# Patient Record
Sex: MALE | Race: WHITE | NOT HISPANIC OR LATINO | Employment: FULL TIME | URBAN - METROPOLITAN AREA
[De-identification: names, ages, dates, MRNs, and addresses within clinical notes are randomized per-mention and may not be internally consistent; named-entity substitution may affect disease eponyms.]

---

## 2017-01-06 DIAGNOSIS — K21.9 GASTROESOPHAGEAL REFLUX DISEASE WITHOUT ESOPHAGITIS: ICD-10-CM

## 2017-01-06 RX ORDER — OMEPRAZOLE 40 MG/1
CAPSULE, DELAYED RELEASE ORAL
Qty: 90 CAPSULE | Refills: 0 | Status: SHIPPED | OUTPATIENT
Start: 2017-01-06 | End: 2017-04-12 | Stop reason: SDUPTHER

## 2017-03-15 DIAGNOSIS — I10 HYPERTENSION, BENIGN: ICD-10-CM

## 2017-03-16 RX ORDER — ALLOPURINOL 100 MG/1
TABLET ORAL
Qty: 90 TABLET | Refills: 0 | Status: SHIPPED | OUTPATIENT
Start: 2017-03-16 | End: 2017-06-07 | Stop reason: SDUPTHER

## 2017-03-28 ENCOUNTER — OFFICE VISIT (OUTPATIENT)
Dept: INTERNAL MEDICINE | Facility: CLINIC | Age: 47
End: 2017-03-28

## 2017-03-28 VITALS
WEIGHT: 231 LBS | DIASTOLIC BLOOD PRESSURE: 82 MMHG | BODY MASS INDEX: 36.26 KG/M2 | TEMPERATURE: 98.9 F | HEIGHT: 67 IN | OXYGEN SATURATION: 97 % | HEART RATE: 76 BPM | SYSTOLIC BLOOD PRESSURE: 130 MMHG

## 2017-03-28 DIAGNOSIS — M79.10 MUSCLE PAIN: ICD-10-CM

## 2017-03-28 DIAGNOSIS — R53.83 FATIGUE, UNSPECIFIED TYPE: ICD-10-CM

## 2017-03-28 DIAGNOSIS — Z11.59 SCREENING FOR VIRAL DISEASE: ICD-10-CM

## 2017-03-28 DIAGNOSIS — R73.09 ELEVATED GLUCOSE: ICD-10-CM

## 2017-03-28 DIAGNOSIS — I10 ESSENTIAL HYPERTENSION: Primary | ICD-10-CM

## 2017-03-28 DIAGNOSIS — K21.00 GASTROESOPHAGEAL REFLUX DISEASE WITH ESOPHAGITIS: ICD-10-CM

## 2017-03-28 DIAGNOSIS — E78.49 OTHER HYPERLIPIDEMIA: ICD-10-CM

## 2017-03-28 LAB
ALBUMIN SERPL-MCNC: 4.11 G/DL (ref 3.4–4.6)
ALBUMIN/GLOB SERPL: 1.4 G/DL
ALP SERPL-CCNC: 72 U/L (ref 46–116)
ALT SERPL W P-5'-P-CCNC: 36 U/L (ref 16–63)
ANION GAP SERPL CALCULATED.3IONS-SCNC: 10 MMOL/L
AST SERPL-CCNC: 35 U/L (ref 7–37)
BILIRUB SERPL-MCNC: 0.4 MG/DL (ref 0.2–1)
BUN BLD-MCNC: 15 MG/DL (ref 6–22)
BUN/CREAT SERPL: 9.4 (ref 7–25)
CALCIUM SPEC-SCNC: 9 MG/DL (ref 8.6–10.5)
CHLORIDE SERPL-SCNC: 103 MMOL/L (ref 95–107)
CHOLEST SERPL-MCNC: 203 MG/DL (ref 0–200)
CO2 SERPL-SCNC: 28 MMOL/L (ref 23–32)
CREAT BLD-MCNC: 1.6 MG/DL (ref 0.7–1.3)
EXPIRATION DATE: NORMAL
FLUAV AG NPH QL: NEGATIVE
FLUBV AG NPH QL: NEGATIVE
GFR SERPL CREATININE-BSD FRML MDRD: 47 ML/MIN/1.73
GLOBULIN UR ELPH-MCNC: 3 GM/DL
GLUCOSE BLD-MCNC: 104 MG/DL (ref 70–100)
HBA1C MFR BLD: 5.9 % (ref 4–6)
HDLC SERPL-MCNC: 45 MG/DL (ref 40–81)
INTERNAL CONTROL: NORMAL
LDLC SERPL CALC-MCNC: 108 MG/DL (ref 0–100)
LDLC/HDLC SERPL: 2.4 {RATIO}
Lab: NORMAL
POTASSIUM BLD-SCNC: 4.7 MMOL/L (ref 3.3–5.3)
PROT SERPL-MCNC: 7.1 G/DL (ref 6.3–8.4)
SODIUM BLD-SCNC: 141 MMOL/L (ref 136–145)
TRIGL SERPL-MCNC: 250 MG/DL (ref 0–150)
VLDLC SERPL-MCNC: 50 MG/DL

## 2017-03-28 PROCEDURE — 87804 INFLUENZA ASSAY W/OPTIC: CPT | Performed by: INTERNAL MEDICINE

## 2017-03-28 PROCEDURE — 80061 LIPID PANEL: CPT | Performed by: INTERNAL MEDICINE

## 2017-03-28 PROCEDURE — 99214 OFFICE O/P EST MOD 30 MIN: CPT | Performed by: INTERNAL MEDICINE

## 2017-03-28 PROCEDURE — 80053 COMPREHEN METABOLIC PANEL: CPT | Performed by: INTERNAL MEDICINE

## 2017-03-28 PROCEDURE — 83036 HEMOGLOBIN GLYCOSYLATED A1C: CPT | Performed by: INTERNAL MEDICINE

## 2017-03-28 RX ORDER — PROMETHAZINE HYDROCHLORIDE 12.5 MG/1
12.5 TABLET ORAL EVERY 6 HOURS PRN
COMMUNITY
End: 2017-08-07

## 2017-03-28 NOTE — PROGRESS NOTES
"Subjective   Isma De Luna is a 47 y.o. male here for   Chief Complaint   Patient presents with   • Hyperlipidemia   • Hypertension   • URI   .    Vitals:    03/28/17 0834   BP: 130/82   BP Location: Left arm   Patient Position: Sitting   Cuff Size: Adult   Pulse: 76   Temp: 98.9 °F (37.2 °C)   SpO2: 97%   Weight: 231 lb (105 kg)   Height: 66.75\" (169.5 cm)       Hyperlipidemia   This is a chronic problem. The current episode started more than 1 year ago. The problem is controlled. Recent lipid tests were reviewed and are normal. He has no history of diabetes or hypothyroidism. Pertinent negatives include no chest pain or shortness of breath.   Hypertension   This is a chronic problem. The current episode started more than 1 year ago. The problem is unchanged. The problem is controlled. Associated symptoms include malaise/fatigue. Pertinent negatives include no anxiety, chest pain, palpitations, peripheral edema or shortness of breath.   URI    This is a new problem. The current episode started today. The problem has been unchanged. There has been no fever. Associated symptoms include congestion, coughing and ear pain. Pertinent negatives include no chest pain, sneezing, sore throat or wheezing. He has tried nothing for the symptoms.        Encounter Diagnoses   Name Primary?   • Essential hypertension Yes   • Other hyperlipidemia    • Fatigue, unspecified type    • Elevated glucose    • Gastroesophageal reflux disease with esophagitis    • Screening for viral disease    • Muscle pain        The following portions of the patient's history were reviewed and updated as appropriate: allergies, current medications, past social history and problem list.    Review of Systems   Constitutional: Positive for fatigue and malaise/fatigue. Negative for activity change, appetite change, chills and fever.   HENT: Positive for congestion, ear pain and postnasal drip. Negative for sinus pressure, sneezing, sore throat, trouble " swallowing and voice change.    Respiratory: Positive for cough. Negative for chest tightness, shortness of breath and wheezing.    Cardiovascular: Negative for chest pain, palpitations and leg swelling.   Psychiatric/Behavioral: Negative for dysphoric mood and sleep disturbance. The patient is not nervous/anxious.        Objective   Physical Exam   Constitutional: He appears well-developed and well-nourished. No distress.   HENT:   Head: Normocephalic.   Right Ear: External ear normal. Tympanic membrane is bulging. Tympanic membrane is not erythematous.   Left Ear: External ear normal. Tympanic membrane is bulging. Tympanic membrane is not erythematous.   Nose: Right sinus exhibits no frontal sinus tenderness. Left sinus exhibits no frontal sinus tenderness.   Mouth/Throat: Oropharynx is clear and moist. No oropharyngeal exudate.   Neck: Normal range of motion. Neck supple.   Cardiovascular: Normal rate, regular rhythm and normal heart sounds.    Pulmonary/Chest: Effort normal and breath sounds normal. No respiratory distress. He has no wheezes. He has no rales. He exhibits no tenderness.   Musculoskeletal: He exhibits no edema.   Lymphadenopathy:     He has no cervical adenopathy.   Psychiatric: He has a normal mood and affect. His behavior is normal.   Nursing note and vitals reviewed.    Influenza A/B negative today.    Assessment/Plan   Problem List Items Addressed This Visit        Unprioritized    Hypertension - Primary    Relevant Orders    Comprehensive Metabolic Panel (Completed)    Lipid Panel (Completed)    Hyperlipidemia    Relevant Orders    Comprehensive Metabolic Panel (Completed)    Lipid Panel (Completed)    Gastroesophageal reflux disease with esophagitis    Elevated glucose    Relevant Orders    Hemoglobin A1c (Completed)      Other Visit Diagnoses     Fatigue, unspecified type        Screening for viral disease        Relevant Orders    HIV-1 / O / 2 Ag / Antibody 4th Generation    Muscle pain         Relevant Orders    POCT Influenza A/B (Completed)       HTN stable.   High chol & glc - need diet & exercise.   Fatigue - call if incr sx.   Muscle pain & head congestion - need fluids, rest, otc med for congestion.  Call if worsening.   No sign of infection so far.   He requests HIV test today.       Pneumovax 23 done 10/2012.  Need prevnar 13 in order to protect ill brother - to discuss with insurance.

## 2017-03-29 LAB — HIV 1+2 AB+HIV1 P24 AG SERPL QL IA: NON REACTIVE

## 2017-04-12 DIAGNOSIS — K21.9 GASTROESOPHAGEAL REFLUX DISEASE WITHOUT ESOPHAGITIS: ICD-10-CM

## 2017-04-13 RX ORDER — OMEPRAZOLE 40 MG/1
CAPSULE, DELAYED RELEASE ORAL
Qty: 90 CAPSULE | Refills: 1 | Status: SHIPPED | OUTPATIENT
Start: 2017-04-13 | End: 2017-10-04 | Stop reason: SDUPTHER

## 2017-06-07 DIAGNOSIS — I10 HYPERTENSION, BENIGN: ICD-10-CM

## 2017-06-08 RX ORDER — ESCITALOPRAM OXALATE 20 MG/1
TABLET ORAL
Qty: 90 TABLET | Refills: 1 | Status: SHIPPED | OUTPATIENT
Start: 2017-06-08 | End: 2017-11-06

## 2017-06-08 RX ORDER — LOSARTAN POTASSIUM 100 MG/1
TABLET ORAL
Qty: 90 TABLET | Refills: 1 | Status: SHIPPED | OUTPATIENT
Start: 2017-06-08 | End: 2017-12-06 | Stop reason: SDUPTHER

## 2017-06-08 RX ORDER — ALLOPURINOL 100 MG/1
TABLET ORAL
Qty: 90 TABLET | Refills: 1 | Status: SHIPPED | OUTPATIENT
Start: 2017-06-08 | End: 2017-12-06 | Stop reason: SDUPTHER

## 2017-08-07 ENCOUNTER — OFFICE VISIT (OUTPATIENT)
Dept: INTERNAL MEDICINE | Facility: CLINIC | Age: 47
End: 2017-08-07

## 2017-08-07 VITALS
BODY MASS INDEX: 38.3 KG/M2 | HEIGHT: 67 IN | TEMPERATURE: 98.2 F | DIASTOLIC BLOOD PRESSURE: 90 MMHG | WEIGHT: 244 LBS | SYSTOLIC BLOOD PRESSURE: 160 MMHG | OXYGEN SATURATION: 97 % | HEART RATE: 81 BPM

## 2017-08-07 DIAGNOSIS — G54.0 BRACHIAL PLEXUS NEUROPATHY: ICD-10-CM

## 2017-08-07 DIAGNOSIS — G54.0 BRACHIAL PLEXUS NEUROPATHY: Primary | ICD-10-CM

## 2017-08-07 DIAGNOSIS — M54.50 MIDLINE LOW BACK PAIN WITHOUT SCIATICA: ICD-10-CM

## 2017-08-07 DIAGNOSIS — M54.2 NECK PAIN: Primary | ICD-10-CM

## 2017-08-07 PROBLEM — Q85.03: Status: ACTIVE | Noted: 2017-02-08

## 2017-08-07 PROBLEM — Z98.890 HISTORY OF LUMBOSACRAL SPINE SURGERY: Status: ACTIVE | Noted: 2017-02-08

## 2017-08-07 PROCEDURE — 99214 OFFICE O/P EST MOD 30 MIN: CPT | Performed by: NURSE PRACTITIONER

## 2017-08-07 RX ORDER — METHYLPREDNISOLONE 4 MG/1
TABLET ORAL
Qty: 21 TABLET | Refills: 0 | Status: SHIPPED | OUTPATIENT
Start: 2017-08-07 | End: 2017-08-28

## 2017-08-07 RX ORDER — METHYLPREDNISOLONE 4 MG/1
TABLET ORAL
Qty: 21 TABLET | Refills: 0 | OUTPATIENT
Start: 2017-08-07

## 2017-08-07 RX ORDER — HYDROCODONE BITARTRATE AND ACETAMINOPHEN 10; 325 MG/1; MG/1
1 TABLET ORAL EVERY 6 HOURS PRN
Qty: 120 TABLET | Refills: 0 | Status: SHIPPED | OUTPATIENT
Start: 2017-08-07 | End: 2017-10-17 | Stop reason: SDUPTHER

## 2017-08-07 NOTE — TELEPHONE ENCOUNTER
This Rx was denied due to dx associated was for midline low back pain w/o sciatica and patient states it's for inflammation and swelling on the left side of his neck that is radiating up to his scalp.     This was last prescribed back on 7/516    Please advise.    Thank you,    Saad

## 2017-08-07 NOTE — TELEPHONE ENCOUNTER
----- Message from Alia Wills sent at 8/7/2017 12:34 PM EDT -----  Contact: Patient  Dr. Benoit patient.      Patient calling back about steroid Rx for his neurofibromatosis.  States he has painful area on left of neck.  Please advise.      MethylPREDNISolone 4 MG FOLLOW PACKAGE DIRECTIONS      Patient:  [phone]    Pharmacy:  [facility] [address] [place] [address] [place]  AT [facility] [address] - [phone] [facility] [phone] FX

## 2017-08-07 NOTE — TELEPHONE ENCOUNTER
----- Message from Ajnolan Brayan sent at 8/7/2017 10:51 AM EDT -----  Contact: pt  Pt calling, he requested a refill on Medrol Dose jeremiah. Patient called earlier and was told to call his pharmacy to request a refill. The refill request was denied.  Pt says that he has Neurofibromas Condition and occasionally he gets a flare up. Pt says that he has pain, inflammation and swelling on the left side of his neck going up into his scalp. He says usually he just get a prescription for medrol jeremiah to get it down. He says that he only gets is when needed. He is unable to take ibuprofen because he has a kidney disease. Pt is requesting a  Refill. Please advise.     MethylPREDNISolone 4 MG FOLLOW PACKAGE DIRECTIONS     [facility] [id number] - [address] AT [facility] [address] - [phone]  - [phone] FX    #[phone]

## 2017-08-08 NOTE — PROGRESS NOTES
Subjective   Isma De Luna is a 47 y.o. male who presents due to left-sided neck pain.    HPI Comments: He has a history of brachial plexus neuropathy which has been evaluated but MRI in the past. He has done relatively well until the past week when he c/o left-sided neck pain and decreased range of motion. Of course, he is not a candidate for NSAIDs due to hx of renal cancer, has taken Tylenol with mild pain relief.     Neck Pain    This is a recurrent problem. The current episode started more than 1 year ago. The problem occurs intermittently. The problem has been waxing and waning. The pain is present in the left side and midline. The quality of the pain is described as aching and shooting. The pain is at a severity of 9/10. The pain is severe. The symptoms are aggravated by twisting and position. Associated symptoms include headaches, numbness and tingling. Pertinent negatives include no chest pain, fever, trouble swallowing or weakness. He has tried ice and heat for the symptoms. The treatment provided mild relief.        The following portions of the patient's history were reviewed and updated as appropriate: allergies, current medications, past social history and problem list.    Past Medical History:   Diagnosis Date   • Backache    • Chronic pain    • Dyspepsia    • ED (erectile dysfunction)    • Gout    • Hyperlipidemia    • Hypertension    • Renal cancer    • Vitamin B12 deficiency          Current Outpatient Prescriptions:   •  allopurinol (ZYLOPRIM) 100 MG tablet, TAKE 1 TABLET BY MOUTH EVERY DAY, Disp: 90 tablet, Rfl: 1  •  B Complex-Biotin-FA (HM VITAMIN B100 COMPLEX PO), Take 1 tablet by mouth daily., Disp: , Rfl:   •  carbonyl iron (FEOSOL) 45 MG tablet tablet, Take  by mouth daily., Disp: , Rfl:   •  Cholecalciferol (VITAMIN D3) 600 UNITS capsule capsule, Take 600 Units by mouth daily., Disp: , Rfl:   •  Cyanocobalamin 1000 MCG sublingual tablet, Place 1 tablet under the tongue daily., Disp: , Rfl:    •  cyclobenzaprine (FLEXERIL) 10 MG tablet, TK 1 T PO TID PRF MUSCLE SPASMS, Disp: , Rfl: 2  •  escitalopram (LEXAPRO) 20 MG tablet, TAKE 1 TABLET BY MOUTH EVERY DAY, Disp: 90 tablet, Rfl: 1  •  fexofenadine (ALLEGRA) 180 MG tablet, Take 180 mg by mouth daily., Disp: , Rfl:   •  gabapentin (NEURONTIN) 300 MG capsule, TAKE 1 CAPSULE BY MOUTH 5 TIMES DAILY (Patient taking differently: Taking 600 mg TID), Disp: 450 capsule, Rfl: 0  •  losartan (COZAAR) 100 MG tablet, TAKE 1 TABLET BY MOUTH DAILY, Disp: 90 tablet, Rfl: 1  •  Multiple Vitamin (MULTI-VITAMIN DAILY PO), Take  by mouth., Disp: , Rfl:   •  omeprazole (priLOSEC) 40 MG capsule, TAKE 1 CAPSULE BY MOUTH DAILY, Disp: 90 capsule, Rfl: 1  •  sildenafil (REVATIO) 20 MG tablet, Take 1 tablet by mouth Daily., Disp: 90 tablet, Rfl: 3  •  HYDROcodone-acetaminophen (NORCO)  MG per tablet, Take 1 tablet by mouth Every 6 (Six) Hours As Needed for Moderate Pain (4-6)., Disp: 120 tablet, Rfl: 0  •  MethylPREDNISolone (MEDROL) 4 MG tablet, follow package directions, Disp: 21 tablet, Rfl: 0    Allergies   Allergen Reactions   • Pollen Extract      Other reaction(s): Other (See Comments)  Pollen, rag weed        Review of Systems   Constitutional: Negative for chills, fatigue, fever and unexpected weight change.   HENT: Negative for congestion, ear pain, postnasal drip, sinus pressure, sore throat and trouble swallowing.    Eyes: Negative for visual disturbance.   Respiratory: Negative for cough, chest tightness and wheezing.    Cardiovascular: Negative for chest pain, palpitations and leg swelling.   Gastrointestinal: Negative for abdominal pain, blood in stool, nausea and vomiting.   Endocrine: Negative for cold intolerance and heat intolerance.   Genitourinary: Negative for dysuria, frequency and urgency.   Musculoskeletal: Positive for neck pain and neck stiffness. Negative for arthralgias and joint swelling.   Skin: Negative for color change.  "  Allergic/Immunologic: Negative for immunocompromised state.   Neurological: Positive for tingling, numbness and headaches. Negative for syncope and weakness.   Hematological: Does not bruise/bleed easily.       Objective   Vitals:    08/07/17 1447   BP: 160/90   BP Location: Left arm   Patient Position: Sitting   Cuff Size: Adult   Pulse: 81   Temp: 98.2 °F (36.8 °C)   TempSrc: Oral   SpO2: 97%   Weight: 244 lb (111 kg)   Height: 66.75\" (169.5 cm)     Physical Exam   Constitutional: He is oriented to person, place, and time. He appears well-developed and well-nourished. No distress.   HENT:   Head: Normocephalic and atraumatic.   Right Ear: External ear normal.   Left Ear: External ear normal.   Eyes: Conjunctivae are normal.   Neck: Neck supple. Muscular tenderness present. No spinous process tenderness present. Decreased range of motion present.   Cardiovascular: Normal rate, regular rhythm, normal heart sounds and intact distal pulses.  Exam reveals no gallop and no friction rub.    No murmur heard.  Pulmonary/Chest: Effort normal and breath sounds normal. No respiratory distress.   Lymphadenopathy:     He has no cervical adenopathy.   Neurological: He is alert and oriented to person, place, and time.   Skin: Skin is warm and dry. No rash noted. No erythema.   Psychiatric: He has a normal mood and affect. His behavior is normal.   Nursing note and vitals reviewed.      Assessment/Plan   Isma was seen today for neck pain.    Diagnoses and all orders for this visit:    Neck pain  Comments:  Hydrocodone refilled by Dr. De Luna, he has f/u with Dr. Benoit next month  Orders:  -     MethylPREDNISolone (MEDROL) 4 MG tablet; follow package directions    Brachial plexus neuropathy  Comments:  with acute flare, will treat with Medrol & refill Hydrocodone, consider further evaluation if sx persist/worsen (discussed)    BIRGIT query complete. Treatment plan to include limited course of prescribed  controlled substance. " Risks including addiction, benefits, and alternatives presented to patient.

## 2017-08-28 ENCOUNTER — OFFICE VISIT (OUTPATIENT)
Dept: INTERNAL MEDICINE | Facility: CLINIC | Age: 47
End: 2017-08-28

## 2017-08-28 VITALS
WEIGHT: 243 LBS | BODY MASS INDEX: 38.14 KG/M2 | HEIGHT: 67 IN | SYSTOLIC BLOOD PRESSURE: 110 MMHG | DIASTOLIC BLOOD PRESSURE: 80 MMHG

## 2017-08-28 DIAGNOSIS — G54.0 BRACHIAL PLEXUS NEUROPATHY: ICD-10-CM

## 2017-08-28 DIAGNOSIS — M54.2 NECK PAIN: Primary | ICD-10-CM

## 2017-08-28 PROCEDURE — 99213 OFFICE O/P EST LOW 20 MIN: CPT | Performed by: NURSE PRACTITIONER

## 2017-08-28 RX ORDER — GABAPENTIN 600 MG/1
TABLET ORAL 3 TIMES DAILY
Refills: 3 | COMMUNITY
Start: 2017-08-10 | End: 2017-11-06

## 2017-08-28 RX ORDER — METHYLPREDNISOLONE 4 MG/1
TABLET ORAL
Qty: 21 TABLET | Refills: 0 | Status: SHIPPED | OUTPATIENT
Start: 2017-08-28 | End: 2017-09-26

## 2017-08-28 NOTE — PROGRESS NOTES
Subjective   Isma De Luna is a 47 y.o. male.     HPI Comments: Most recent imaging of brachial plexus 2/2017 at which time he saw neurologist for routine follow up. No acute changes noted. He is holding on any surgery due to possibility of loss of motor use of left upper extremity. He was treated for similar symptoms 8/7/2017 and treated with medrol dose jeremiah with relief.     Neck Pain    This is a chronic problem. The current episode started in the past 7 days. The problem occurs intermittently. The problem has been gradually worsening. The pain is associated with nothing. The pain is present in the left side. The quality of the pain is described as aching and stabbing (intermittent stabbing ). The pain is at a severity of 4/10 (worst 7/10). The pain is mild. The symptoms are aggravated by twisting and position. Pertinent negatives include no chest pain, fever, numbness or tingling. Treatments tried: hydrocodone and increased dosing of gabapentin  The treatment provided mild relief.        The following portions of the patient's history were reviewed and updated as appropriate: allergies, current medications, past family history, past medical history, past social history, past surgical history and problem list.    Review of Systems   Constitutional: Negative for activity change, appetite change, fatigue and fever.   Respiratory: Negative for cough, shortness of breath and wheezing.    Cardiovascular: Negative for chest pain, palpitations and leg swelling.   Musculoskeletal: Positive for neck pain and neck stiffness.   Skin: Negative for rash.   Neurological: Negative for tingling and numbness.       Objective   Physical Exam   Constitutional: He is oriented to person, place, and time. He appears well-developed and well-nourished.   HENT:   Head: Normocephalic.   Nose: Nose normal.   Neck: Muscular tenderness present. Decreased range of motion present.       Cardiovascular: Regular rhythm and normal heart sounds.   Exam reveals no S3 and no S4.    No murmur heard.  Pulmonary/Chest: Effort normal and breath sounds normal. He has no decreased breath sounds. He has no wheezes. He has no rhonchi. He has no rales.   Musculoskeletal: He exhibits no edema.   Neurological: He is alert and oriented to person, place, and time. Gait normal.   Skin: Skin is warm and dry.   Psychiatric: He has a normal mood and affect.       Assessment/Plan   Isma was seen today for neck pain.    Diagnoses and all orders for this visit:    Neck pain  Comments:  acute flare; will treat with medrol; may take small dose of muscle relaxer if needed   Orders:  -     MethylPREDNISolone (MEDROL) 4 MG tablet; follow package directions    Brachial plexus neuropathy  Comments:  most recent imaging 2/2017 (will obtain imaging report-pt report no changes)

## 2017-09-26 ENCOUNTER — OFFICE VISIT (OUTPATIENT)
Dept: INTERNAL MEDICINE | Facility: CLINIC | Age: 47
End: 2017-09-26

## 2017-09-26 VITALS
WEIGHT: 245.6 LBS | HEIGHT: 67 IN | DIASTOLIC BLOOD PRESSURE: 88 MMHG | SYSTOLIC BLOOD PRESSURE: 152 MMHG | BODY MASS INDEX: 38.55 KG/M2

## 2017-09-26 DIAGNOSIS — E78.49 OTHER HYPERLIPIDEMIA: ICD-10-CM

## 2017-09-26 DIAGNOSIS — G89.29 OTHER CHRONIC PAIN: ICD-10-CM

## 2017-09-26 DIAGNOSIS — R73.09 ELEVATED GLUCOSE: ICD-10-CM

## 2017-09-26 DIAGNOSIS — Q85.00 NEUROFIBROMATOSIS (HCC): ICD-10-CM

## 2017-09-26 DIAGNOSIS — E53.8 VITAMIN B12 DEFICIENCY: ICD-10-CM

## 2017-09-26 DIAGNOSIS — I10 ESSENTIAL HYPERTENSION: Primary | ICD-10-CM

## 2017-09-26 DIAGNOSIS — M79.2 NEUROPATHIC PAIN, ARM: ICD-10-CM

## 2017-09-26 DIAGNOSIS — Z11.59 SCREENING FOR VIRAL DISEASE: ICD-10-CM

## 2017-09-26 DIAGNOSIS — Z86.39 HISTORY OF VITAMIN D DEFICIENCY: ICD-10-CM

## 2017-09-26 DIAGNOSIS — F39 MOOD DISORDER (HCC): ICD-10-CM

## 2017-09-26 LAB
ALBUMIN SERPL-MCNC: 4.5 G/DL (ref 3.5–5.2)
ALBUMIN/GLOB SERPL: 1.7 G/DL
ALP SERPL-CCNC: 63 U/L (ref 39–117)
ALT SERPL W P-5'-P-CCNC: 20 U/L (ref 1–41)
ANION GAP SERPL CALCULATED.3IONS-SCNC: 11.2 MMOL/L
AST SERPL-CCNC: 23 U/L (ref 1–40)
BILIRUB SERPL-MCNC: 0.4 MG/DL (ref 0.1–1.2)
BUN BLD-MCNC: 14 MG/DL (ref 6–20)
BUN/CREAT SERPL: 8.9 (ref 7–25)
CALCIUM SPEC-SCNC: 10 MG/DL (ref 8.6–10.5)
CHLORIDE SERPL-SCNC: 103 MMOL/L (ref 98–107)
CHOLEST SERPL-MCNC: 186 MG/DL (ref 0–200)
CO2 SERPL-SCNC: 27.8 MMOL/L (ref 22–29)
CREAT BLD-MCNC: 1.58 MG/DL (ref 0.76–1.27)
FOLATE SERPL-MCNC: >20 NG/ML (ref 4.78–24.2)
GFR SERPL CREATININE-BSD FRML MDRD: 47 ML/MIN/1.73
GLOBULIN UR ELPH-MCNC: 2.6 GM/DL
GLUCOSE BLD-MCNC: 106 MG/DL (ref 65–99)
HBA1C MFR BLD: 5.5 % (ref 4.8–5.6)
HDLC SERPL-MCNC: 38 MG/DL (ref 40–60)
HIV1 P24 AG SER QL: NORMAL
HIV1+2 AB SER QL: NORMAL
LDLC SERPL CALC-MCNC: 92 MG/DL (ref 0–100)
LDLC/HDLC SERPL: 2.43 {RATIO}
POTASSIUM BLD-SCNC: 4.5 MMOL/L (ref 3.5–5.2)
PROT SERPL-MCNC: 7.1 G/DL (ref 6–8.5)
SODIUM BLD-SCNC: 142 MMOL/L (ref 136–145)
TRIGL SERPL-MCNC: 279 MG/DL (ref 0–150)
VIT B12 BLD-MCNC: 664 PG/ML (ref 211–946)
VLDLC SERPL-MCNC: 55.8 MG/DL (ref 5–40)

## 2017-09-26 PROCEDURE — G0432 EIA HIV-1/HIV-2 SCREEN: HCPCS | Performed by: INTERNAL MEDICINE

## 2017-09-26 PROCEDURE — 82746 ASSAY OF FOLIC ACID SERUM: CPT | Performed by: INTERNAL MEDICINE

## 2017-09-26 PROCEDURE — 80053 COMPREHEN METABOLIC PANEL: CPT | Performed by: INTERNAL MEDICINE

## 2017-09-26 PROCEDURE — 80061 LIPID PANEL: CPT | Performed by: INTERNAL MEDICINE

## 2017-09-26 PROCEDURE — 82607 VITAMIN B-12: CPT | Performed by: INTERNAL MEDICINE

## 2017-09-26 PROCEDURE — 99214 OFFICE O/P EST MOD 30 MIN: CPT | Performed by: INTERNAL MEDICINE

## 2017-09-26 PROCEDURE — 36415 COLL VENOUS BLD VENIPUNCTURE: CPT | Performed by: INTERNAL MEDICINE

## 2017-09-26 PROCEDURE — 87899 AGENT NOS ASSAY W/OPTIC: CPT | Performed by: INTERNAL MEDICINE

## 2017-09-26 PROCEDURE — 83036 HEMOGLOBIN GLYCOSYLATED A1C: CPT | Performed by: INTERNAL MEDICINE

## 2017-09-26 RX ORDER — CYCLOBENZAPRINE HCL 10 MG
10 TABLET ORAL 3 TIMES DAILY PRN
Qty: 90 TABLET | Refills: 4 | Status: SHIPPED | OUTPATIENT
Start: 2017-09-26 | End: 2019-11-20 | Stop reason: SDUPTHER

## 2017-09-26 RX ORDER — METHYLPREDNISOLONE 4 MG/1
TABLET ORAL
Qty: 21 TABLET | Refills: 0 | Status: SHIPPED | OUTPATIENT
Start: 2017-09-26 | End: 2017-11-06

## 2017-09-26 NOTE — PROGRESS NOTES
"Subjective   Isma De Luna is a 47 y.o. male here for   Chief Complaint   Patient presents with   • Hyperlipidemia     6 month follow-up   • Hypertension     6 month follow-up   .    Vitals:    09/26/17 0834 09/26/17 0937   BP: 158/80 152/88   BP Location: Left arm    Patient Position: Sitting    Cuff Size: Adult    Weight: 245 lb 9.6 oz (111 kg)    Height: 66.75\" (169.5 cm)        Body mass index is 38.76 kg/(m^2).    Hyperlipidemia   This is a chronic problem. The current episode started more than 1 year ago. The problem is controlled. Recent lipid tests were reviewed and are normal. He has no history of diabetes. Pertinent negatives include no chest pain or shortness of breath.   Hypertension   This is a chronic problem. The current episode started more than 1 year ago. The problem is unchanged. The problem is controlled. Pertinent negatives include no chest pain, palpitations, peripheral edema or shortness of breath.        The following portions of the patient's history were reviewed and updated as appropriate: allergies, current medications, past social history and problem list.    Review of Systems   Constitutional: Negative for chills, fatigue and fever.   HENT: Positive for congestion and postnasal drip.    Respiratory: Negative for cough, shortness of breath and wheezing.    Cardiovascular: Negative for chest pain, palpitations and leg swelling.   Allergic/Immunologic: Positive for environmental allergies.   Psychiatric/Behavioral: Negative for dysphoric mood and sleep disturbance. The patient is not nervous/anxious.        Objective   Physical Exam   Constitutional: He appears well-developed and well-nourished. No distress.   Cardiovascular: Normal rate, regular rhythm and normal heart sounds.    Pulmonary/Chest: No respiratory distress. He has no wheezes. He has no rales. He exhibits no tenderness.   Musculoskeletal: He exhibits no edema.   Psychiatric: He has a normal mood and affect. His behavior is " normal.   Nursing note and vitals reviewed.      Assessment/Plan   Diagnoses and all orders for this visit:    Essential hypertension  Comments:  high bp here, but normal at home (120-130/76) - need low salt diet & daily exercise - need daily chk & call if over 140/90  Orders:  -     Comprehensive Metabolic Panel; Future  -     Lipid Panel; Future    Neurofibromatosis  Comments:  f/u with surgeon dr hale    Neuropathic pain, arm  Comments:  severe off & on - improved with medrol dose jeremiah, flexeril & hydrocodone  Orders:  -     Vitamin B12 & Folate; Future  -     cyclobenzaprine (FLEXERIL) 10 MG tablet; Take 1 tablet by mouth 3 (Three) Times a Day As Needed for Muscle Spasms.  -     MethylPREDNISolone (MEDROL) 4 MG tablet; follow package directions    Mood disorder  Comments:  stable - call if problems    Vitamin B12 deficiency  Comments:  continue B vitamins po daily  Orders:  -     Vitamin B12 & Folate; Future    Other hyperlipidemia  Comments:  need healthy diet  Orders:  -     Comprehensive Metabolic Panel; Future  -     Lipid Panel; Future    History of vitamin D deficiency  Comments:  continue daily vit D    Elevated glucose  Comments:  need low sugar/carb diet   Orders:  -     Hemoglobin A1c; Future    Other chronic pain  Comments:  lyrica started last wk by neurologist    Screening for viral disease  -     HIV-1 / O / 2 Ag / Antibody 4th Generation; Future    Other orders  -     LYRICA 75 MG capsule; Take 1 capsule by mouth 2 (Two) Times a Day.

## 2017-09-27 NOTE — PROGRESS NOTES
High sugar/cholesterol/fat - need low fat/sugar diet & more exercise.  Abnormal kidney test - need to increase water intake & avoid ibuprofen, etc.  Only tylenol does not affect the kidneys. Other labs normal.

## 2017-10-04 DIAGNOSIS — K21.9 GASTROESOPHAGEAL REFLUX DISEASE WITHOUT ESOPHAGITIS: ICD-10-CM

## 2017-10-05 RX ORDER — OMEPRAZOLE 40 MG/1
CAPSULE, DELAYED RELEASE ORAL
Qty: 90 CAPSULE | Refills: 1 | Status: SHIPPED | OUTPATIENT
Start: 2017-10-05 | End: 2018-04-04 | Stop reason: SDUPTHER

## 2017-10-17 DIAGNOSIS — G54.0 BRACHIAL PLEXUS NEUROPATHY: ICD-10-CM

## 2017-10-17 RX ORDER — HYDROCODONE BITARTRATE AND ACETAMINOPHEN 10; 325 MG/1; MG/1
1 TABLET ORAL EVERY 6 HOURS PRN
Qty: 120 TABLET | Refills: 0 | Status: SHIPPED | OUTPATIENT
Start: 2017-10-17 | End: 2017-11-06 | Stop reason: SDUPTHER

## 2017-10-17 NOTE — TELEPHONE ENCOUNTER
Please review refill request:    Last OV:9/26/17    Last Prescribed: 8/7/17    BIRGIT: Ordered    Thank you

## 2017-10-19 NOTE — TELEPHONE ENCOUNTER
Patient was informed Rx is ready.    He also would like a referral to [facility].    Please Advise    Thank you

## 2017-10-23 ENCOUNTER — TELEPHONE (OUTPATIENT)
Dept: INTERNAL MEDICINE | Facility: CLINIC | Age: 47
End: 2017-10-23

## 2017-10-23 DIAGNOSIS — R52 PAIN: Primary | ICD-10-CM

## 2017-10-23 NOTE — TELEPHONE ENCOUNTER
----- Message from Lizeth Albert sent at 10/23/2017 11:44 AM EDT -----  Contact: Referral request  Patient is calling for a referral to [facility] and requests to be scheduled with Dr. Montes who was recommended to him by his brother.  The patient has Schwannomatosis that is currently causing the most pain in his neck and shoulder with pain down both arms.  He also has some pain with his lower back, but not as intense as his neck and shoulder.    He has been using Cymbalta, Gabapentin, and Hydocodone and the combination of these has generally controlled the problem.  However, he feels it is most likely best if he goes ahead and refers out to a pain management group rather than keep asking Dr. Benoit to prescribe these types of meds and perhaps they may have other treatments available.    Patients # [phone]

## 2017-11-06 ENCOUNTER — OFFICE VISIT (OUTPATIENT)
Dept: PAIN MEDICINE | Facility: CLINIC | Age: 47
End: 2017-11-06

## 2017-11-06 VITALS
TEMPERATURE: 98.1 F | BODY MASS INDEX: 37.31 KG/M2 | DIASTOLIC BLOOD PRESSURE: 90 MMHG | HEART RATE: 87 BPM | OXYGEN SATURATION: 98 % | RESPIRATION RATE: 18 BRPM | HEIGHT: 68 IN | WEIGHT: 246.2 LBS | SYSTOLIC BLOOD PRESSURE: 149 MMHG

## 2017-11-06 DIAGNOSIS — G89.3 CHRONIC PAIN DUE TO NEOPLASM: Primary | ICD-10-CM

## 2017-11-06 DIAGNOSIS — G54.0 BRACHIAL PLEXUS NEUROPATHY: ICD-10-CM

## 2017-11-06 DIAGNOSIS — Q85.00 NEUROFIBROMATOSIS (HCC): ICD-10-CM

## 2017-11-06 DIAGNOSIS — M79.2 NEUROPATHIC PAIN, ARM: ICD-10-CM

## 2017-11-06 LAB
POC AMPHETAMINES: NEGATIVE
POC BARBITURATES: NEGATIVE
POC BENZODIAZEPHINES: NEGATIVE
POC COCAINE: NEGATIVE
POC METHADONE: NEGATIVE
POC METHAMPHETAMINE SCREEN URINE: NEGATIVE
POC OPIATES: POSITIVE
POC OXYCODONE: NEGATIVE
POC PHENCYCLIDINE: NEGATIVE
POC PROPOXYPHENE: NEGATIVE
POC THC: NEGATIVE
POC TRICYCLIC ANTIDEPRESSANTS: POSITIVE

## 2017-11-06 PROCEDURE — 99203 OFFICE O/P NEW LOW 30 MIN: CPT | Performed by: ANESTHESIOLOGY

## 2017-11-06 PROCEDURE — 80305 DRUG TEST PRSMV DIR OPT OBS: CPT | Performed by: ANESTHESIOLOGY

## 2017-11-06 RX ORDER — HYDROCODONE BITARTRATE AND ACETAMINOPHEN 10; 325 MG/1; MG/1
1 TABLET ORAL EVERY 6 HOURS PRN
Qty: 120 TABLET | Refills: 0 | Status: SHIPPED | OUTPATIENT
Start: 2017-11-06 | End: 2017-12-06 | Stop reason: SDUPTHER

## 2017-11-06 RX ORDER — DULOXETIN HYDROCHLORIDE 30 MG/1
CAPSULE, DELAYED RELEASE ORAL
COMMUNITY
Start: 2017-11-04 | End: 2017-11-06 | Stop reason: SDUPTHER

## 2017-11-06 RX ORDER — GABAPENTIN 800 MG/1
800 TABLET ORAL 3 TIMES DAILY
Qty: 90 TABLET | Refills: 5 | Status: SHIPPED | OUTPATIENT
Start: 2017-11-06 | End: 2017-12-06 | Stop reason: SDUPTHER

## 2017-11-06 RX ORDER — DULOXETIN HYDROCHLORIDE 30 MG/1
30 CAPSULE, DELAYED RELEASE ORAL 2 TIMES DAILY
Qty: 60 CAPSULE | Refills: 11 | Status: SHIPPED | OUTPATIENT
Start: 2017-11-06 | End: 2018-11-28 | Stop reason: SDUPTHER

## 2017-11-06 RX ORDER — GABAPENTIN 800 MG/1
TABLET ORAL
Refills: 5 | COMMUNITY
Start: 2017-11-01 | End: 2017-11-06 | Stop reason: SDUPTHER

## 2017-12-06 ENCOUNTER — OFFICE VISIT (OUTPATIENT)
Dept: PAIN MEDICINE | Facility: CLINIC | Age: 47
End: 2017-12-06

## 2017-12-06 VITALS
SYSTOLIC BLOOD PRESSURE: 139 MMHG | WEIGHT: 247 LBS | BODY MASS INDEX: 37.44 KG/M2 | HEIGHT: 68 IN | TEMPERATURE: 97.9 F | HEART RATE: 78 BPM | DIASTOLIC BLOOD PRESSURE: 85 MMHG | RESPIRATION RATE: 16 BRPM | OXYGEN SATURATION: 96 %

## 2017-12-06 DIAGNOSIS — Q85.00 NEUROFIBROMATOSIS (HCC): ICD-10-CM

## 2017-12-06 DIAGNOSIS — D49.9 NEOPLASTIC DISEASE: ICD-10-CM

## 2017-12-06 DIAGNOSIS — G54.0 BRACHIAL PLEXUS NEUROPATHY: ICD-10-CM

## 2017-12-06 DIAGNOSIS — G89.29 OTHER CHRONIC PAIN: Primary | ICD-10-CM

## 2017-12-06 DIAGNOSIS — I10 HYPERTENSION, BENIGN: ICD-10-CM

## 2017-12-06 PROCEDURE — 99213 OFFICE O/P EST LOW 20 MIN: CPT | Performed by: ANESTHESIOLOGY

## 2017-12-06 RX ORDER — HYDROCODONE BITARTRATE AND ACETAMINOPHEN 10; 325 MG/1; MG/1
1 TABLET ORAL EVERY 6 HOURS PRN
Qty: 120 TABLET | Refills: 0 | Status: SHIPPED | OUTPATIENT
Start: 2017-12-06 | End: 2017-12-06 | Stop reason: SDUPTHER

## 2017-12-06 RX ORDER — HYDROCODONE BITARTRATE AND ACETAMINOPHEN 10; 325 MG/1; MG/1
TABLET ORAL
Qty: 100 TABLET | Refills: 0 | Status: SHIPPED | OUTPATIENT
Start: 2017-12-06 | End: 2018-06-21 | Stop reason: SDUPTHER

## 2017-12-06 RX ORDER — GABAPENTIN 800 MG/1
800 TABLET ORAL 3 TIMES DAILY
Qty: 90 TABLET | Refills: 5 | Status: SHIPPED | OUTPATIENT
Start: 2017-12-06 | End: 2017-12-27 | Stop reason: SDUPTHER

## 2017-12-06 NOTE — PROGRESS NOTES
CHIEF COMPLAINT    F/U shoulder pain. Pt states it has been manageable with medication and is unchanged.     Subjective   Isma De Luna is a 47 y.o. male  who presents to the office for follow-up.He has a history of Neurofibromatosis, chronic pain including brachial plexopathy.      Neck Pain    This is a chronic problem. The current episode started more than 1 month ago. The problem occurs constantly. The problem has been waxing and waning. Associated with: NF. The pain is present in the midline and right side. The quality of the pain is described as aching, cramping and shooting. The pain is at a severity of 0/10 (pain is not flared at this time). The pain is mild. The symptoms are aggravated by twisting. Associated symptoms include numbness. Pertinent negatives include no fever, headaches or weakness. He has tried acetaminophen, bed rest, heat, ice, NSAIDs, oral narcotics, muscle relaxants, neck support and home exercises for the symptoms. The treatment provided moderate relief.        PEG Assessment   What number best describes your pain on average in the past week?1  What number best describes how, during the past week, pain has interfered with your enjoyment of life?0  What number best describes how, during the past week, pain has interfered with your general activity?  0      The following portions of the patient's history were reviewed and updated as appropriate: allergies, current medications, past family history, past medical history, past social history, past surgical history and problem list.    Review of Systems   Constitutional: Negative for chills, fatigue and fever.   HENT: Positive for congestion.    Eyes: Negative for visual disturbance.   Respiratory: Negative for cough, shortness of breath and wheezing.    Cardiovascular: Negative.    Gastrointestinal: Positive for constipation (mild). Negative for diarrhea.   Genitourinary: Negative for difficulty urinating.   Musculoskeletal: Positive for  "arthralgias (bilateral shoulders), back pain and neck pain.   Skin: Negative for rash.   Allergic/Immunologic: Negative for immunocompromised state.   Neurological: Positive for numbness. Negative for dizziness, weakness, light-headedness and headaches.   Hematological: Does not bruise/bleed easily.   Psychiatric/Behavioral: Negative for agitation, confusion, sleep disturbance and suicidal ideas. The patient is not nervous/anxious.            Vitals:    12/06/17 0834   BP: 139/85   Pulse: 78   Resp: 16   Temp: 97.9 °F (36.6 °C)   SpO2: 96%   Weight: 112 kg (247 lb)   Height: 172.7 cm (67.99\")   PainSc:   2   PainLoc: Shoulder         Objective   Physical Exam   Constitutional: He is oriented to person, place, and time. Vital signs are normal. He appears well-developed and well-nourished.  Non-toxic appearance. No distress.   HENT:   Head: Normocephalic and atraumatic.   Right Ear: Hearing and external ear normal.   Left Ear: Hearing and external ear normal.   Nose: Nose normal.   Eyes: Conjunctivae and lids are normal. Pupils are equal, round, and reactive to light.   Pulmonary/Chest: Effort normal. No respiratory distress.   Abdominal: Normal appearance.   Neurological: He is alert and oriented to person, place, and time. No cranial nerve deficit.   Psychiatric: He has a normal mood and affect. His behavior is normal.   Nursing note and vitals reviewed.          Assessment/Plan   Isma was seen today for shoulder pain.    Diagnoses and all orders for this visit:    Other chronic pain    Neoplastic disease    Brachial plexus neuropathy  -     Discontinue: HYDROcodone-acetaminophen (NORCO)  MG per tablet; Take 1 tablet by mouth Every 6 (Six) Hours As Needed for Moderate Pain .  -     HYDROcodone-acetaminophen (NORCO)  MG per tablet; 1 tab po q 6-8 hrs prn severe painful flareup    Neurofibromatosis    Other orders  -     gabapentin (NEURONTIN) 800 MG tablet; Take 1 tablet by mouth 3 (Three) Times a " Day.        --- Follow-up 1 month... Then put him on a follow up schedule on same dates as his brother, q 3 months.    -- Patient appears stable with current regimen. No adverse effects. Regarding continuation of opioids, there is no evidence of aberrant behavior or any red flags.  The patient continues with appropriate response to opioid therapy. ADL's remain intact by self.     -- he does have a significant history of neurofibromatosis tumors and demonstrates moderate improvement with multimodal therapy including opioid therapy, which allows him to engage in ADLs and family activities. The continuation of opioid therapy is therefore not contraindicated. We will however respect the March 2016 CDC Guidelines and will plan to avoid overexposure to opioids and avoid dose escalation.    -- we are decreasing the opioid quantity to a quantity that is more consistent with his current need & use which is c/w [place] statute               BIRGIT REPORT    As part of the patient's treatment plan, I am prescribing controlled substances. The patient has been made aware of appropriate use of such medications, including potential risk of somnolence, limited ability to drive and/or work safely, and the potential for dependence or overdose. It has also bee made clear that these medications are for use by this patient only, without concomitant use of alcohol or other substances unless prescribed.     Patient has completed prescribing agreement detailing terms of continued prescribing of controlled substances, including monitoring BIRGIT reports, urine drug screening, and pill counts if necessary. The patient is aware that inappropriate use will results in cessation of prescribing such medications.    BIRGIT report has been reviewed and scanned into the patient's chart.    Date of last BIRGIT : as above    History and physical exam exhibit continued safe and appropriate use of controlled substances.      EMR Dragon/Transcription  disclaimer:   Much of this encounter note is an electronic transcription/translation of spoken language to printed text. The electronic translation of spoken language may permit erroneous, or at times, nonsensical words or phrases to be inadvertently transcribed; Although I have reviewed the note for such errors, some may still exist.

## 2017-12-07 RX ORDER — LOSARTAN POTASSIUM 100 MG/1
TABLET ORAL
Qty: 90 TABLET | Refills: 1 | Status: SHIPPED | OUTPATIENT
Start: 2017-12-07 | End: 2018-06-21 | Stop reason: SDUPTHER

## 2017-12-07 RX ORDER — ALLOPURINOL 100 MG/1
TABLET ORAL
Qty: 90 TABLET | Refills: 1 | Status: SHIPPED | OUTPATIENT
Start: 2017-12-07 | End: 2018-01-19

## 2017-12-28 RX ORDER — GABAPENTIN 800 MG/1
TABLET ORAL
Qty: 90 TABLET | Refills: 0 | Status: SHIPPED | OUTPATIENT
Start: 2017-12-28 | End: 2018-06-21 | Stop reason: SDUPTHER

## 2018-01-02 ENCOUNTER — OFFICE VISIT (OUTPATIENT)
Dept: PAIN MEDICINE | Facility: CLINIC | Age: 48
End: 2018-01-02

## 2018-01-02 VITALS
DIASTOLIC BLOOD PRESSURE: 88 MMHG | BODY MASS INDEX: 36.98 KG/M2 | RESPIRATION RATE: 16 BRPM | TEMPERATURE: 98.2 F | OXYGEN SATURATION: 96 % | HEIGHT: 68 IN | HEART RATE: 87 BPM | WEIGHT: 244 LBS | SYSTOLIC BLOOD PRESSURE: 141 MMHG

## 2018-01-02 DIAGNOSIS — M25.519 CHRONIC SHOULDER PAIN, UNSPECIFIED LATERALITY: ICD-10-CM

## 2018-01-02 DIAGNOSIS — G89.29 CHRONIC SHOULDER PAIN, UNSPECIFIED LATERALITY: ICD-10-CM

## 2018-01-02 DIAGNOSIS — Z79.899 ENCOUNTER FOR LONG-TERM (CURRENT) USE OF HIGH-RISK MEDICATION: ICD-10-CM

## 2018-01-02 DIAGNOSIS — G89.29 OTHER CHRONIC PAIN: Primary | ICD-10-CM

## 2018-01-02 DIAGNOSIS — Q85.00 NEUROFIBROMATOSIS (HCC): ICD-10-CM

## 2018-01-02 DIAGNOSIS — G54.0 BRACHIAL PLEXUS NEUROPATHY: ICD-10-CM

## 2018-01-02 PROCEDURE — 99213 OFFICE O/P EST LOW 20 MIN: CPT | Performed by: NURSE PRACTITIONER

## 2018-01-02 NOTE — PROGRESS NOTES
"CHIEF COMPLAINT  F/U shoulder pain. States pain is unchanged since last visit. States he has had a diagnosis of carpal tunnel syndrome.    Initial evaluation by Gisell RODRIGUEZ Calixto is a 47 y.o. male  who presents to the office for follow-up.He has a history of chronic shoulder/UE pain due to history of brachial plexus injury and a history of NF. Reports his pain pattern as being unchanged since last office visit. He states the \"tumor pain\" can go from left elbow up into neck.     Since last office visit, he was diagnosed with bilateral CTS after an EMG with Dr. Marina. Anticipates referral to hand specialist. Has to follow-up with neurosurgeon.    Complains of pain in his neck and arms. Today his pain is 0/10VAS. Describes the pain as variable. Continues with Hydrocodone 10/325 2-4/day(\"some days I don't take any\") and gabapentin 800 mg TID and cymbalta 30 mg and Flexeril. Does have occasional itching with the regimen. Does not drive with medication. The regimen helps decrease his pain by 90%. ADL's by self.    Has NF on left side of neck.   Neck Pain    This is a chronic problem. The current episode started more than 1 month ago. The problem occurs constantly. The problem has been waxing and waning (unchanged since last office visit). Associated with: NF. The pain is present in the midline and right side. The quality of the pain is described as aching, cramping and shooting. The pain is at a severity of 0/10 (pain is not flared at this time). The pain is mild. The symptoms are aggravated by twisting. Associated symptoms include numbness. Pertinent negatives include no fever, headaches or weakness. He has tried acetaminophen, bed rest, heat, ice, NSAIDs, oral narcotics, muscle relaxants, neck support and home exercises (hydrocodone) for the symptoms. The treatment provided moderate relief.      PEG Assessment   What number best describes your pain on average in the past week?2  What number " "best describes how, during the past week, pain has interfered with your enjoyment of life?0  What number best describes how, during the past week, pain has interfered with your general activity?  0    The following portions of the patient's history were reviewed and updated as appropriate: allergies, current medications, past family history, past medical history, past social history, past surgical history and problem list.    Review of Systems   Constitutional: Negative for chills, fatigue and fever.   HENT: Negative for congestion.    Eyes: Negative for visual disturbance.   Respiratory: Negative for cough, shortness of breath and wheezing.    Cardiovascular: Negative.    Gastrointestinal: Positive for constipation (mild). Negative for diarrhea.   Genitourinary: Negative for difficulty urinating.   Musculoskeletal: Positive for arthralgias (bilateral shoulders), back pain and neck pain.   Skin: Negative for rash.   Allergic/Immunologic: Negative for immunocompromised state.   Neurological: Positive for numbness. Negative for dizziness (occ), weakness, light-headedness and headaches.   Hematological: Does not bruise/bleed easily.   Psychiatric/Behavioral: Negative for agitation, confusion, sleep disturbance and suicidal ideas. The patient is not nervous/anxious.        Vitals:    01/02/18 0844   BP: 141/88   Pulse: 87   Resp: 16   Temp: 98.2 °F (36.8 °C)   SpO2: 96%   Weight: 111 kg (244 lb)   Height: 172.7 cm (67.99\")   PainSc: 0-No pain   PainLoc: Shoulder     Objective   Physical Exam   Constitutional: He is oriented to person, place, and time. Vital signs are normal. He appears well-developed and well-nourished.  Non-toxic appearance.   HENT:   Head: Normocephalic and atraumatic.   Nose: Nose normal.   Eyes: Conjunctivae and lids are normal.   Cardiovascular: Normal rate, regular rhythm and normal heart sounds.    Pulmonary/Chest: Effort normal and breath sounds normal. No respiratory distress.   Abdominal: " Normal appearance.   Musculoskeletal:   Guarding of BUE's   Neurological: He is alert and oriented to person, place, and time. No cranial nerve deficit. Gait normal.   Reflex Scores:       Bicep reflexes are 1+ on the right side and 1+ on the left side.       Brachioradialis reflexes are 1+ on the right side and 1+ on the left side.  Psychiatric: He has a normal mood and affect. His behavior is normal.   Nursing note and vitals reviewed.      Assessment/Plan   Isma was seen today for shoulder pain.    Diagnoses and all orders for this visit:    Other chronic pain    Brachial plexus neuropathy    Neurofibromatosis    Chronic shoulder pain, unspecified laterality    Encounter for long-term (current) use of high-risk medication      --- The urine drug screen confirmation from 11-6-17 has been reviewed and the result is appropriate based on patient history and BIRGIT report  --- Continue with regimen. Does not need a refill today. Will call if needing a refill.  Patient appears stable with current regimen. No adverse effects. Regarding continuation of opioids, there is no evidence of aberrant behavior or any red flags.  The patient continues with appropriate response to opioid therapy. ADL's remain intact by self.   --- Follow-up 3 months or sooner if needed.       BIRGIT REPORT    As part of the patient's treatment plan, I am prescribing controlled substances. The patient has been made aware of appropriate use of such medications, including potential risk of somnolence, limited ability to drive and/or work safely, and the potential for dependence or overdose. It has also bee made clear that these medications are for use by this patient only, without concomitant use of alcohol or other substances unless prescribed.     Patient has completed prescribing agreement detailing terms of continued prescribing of controlled substances, including monitoring BIRGIT reports, urine drug screening, and pill counts if necessary. The  patient is aware that inappropriate use will results in cessation of prescribing such medications.    BIRGIT report has been reviewed and scanned into the patient's chart.    Date of last BIRGIT : 12-29-17    History and physical exam exhibit continued safe and appropriate use of controlled substances.      EMR Dragon/Transcription disclaimer:   Much of this encounter note is an electronic transcription/translation of spoken language to printed text. The electronic translation of spoken language may permit erroneous, or at times, nonsensical words or phrases to be inadvertently transcribed; Although I have reviewed the note for such errors, some may still exist.

## 2018-01-19 ENCOUNTER — OFFICE VISIT (OUTPATIENT)
Dept: INTERNAL MEDICINE | Facility: CLINIC | Age: 48
End: 2018-01-19

## 2018-01-19 VITALS
BODY MASS INDEX: 38.36 KG/M2 | DIASTOLIC BLOOD PRESSURE: 90 MMHG | SYSTOLIC BLOOD PRESSURE: 158 MMHG | HEIGHT: 67 IN | WEIGHT: 244.4 LBS

## 2018-01-19 DIAGNOSIS — E78.49 OTHER HYPERLIPIDEMIA: ICD-10-CM

## 2018-01-19 DIAGNOSIS — I10 ESSENTIAL HYPERTENSION: Primary | ICD-10-CM

## 2018-01-19 DIAGNOSIS — N18.30 CHRONIC RENAL IMPAIRMENT, STAGE 3 (MODERATE) (HCC): ICD-10-CM

## 2018-01-19 DIAGNOSIS — G56.03 BILATERAL CARPAL TUNNEL SYNDROME: ICD-10-CM

## 2018-01-19 DIAGNOSIS — G89.29 OTHER CHRONIC PAIN: ICD-10-CM

## 2018-01-19 DIAGNOSIS — Z11.59 SCREENING FOR VIRAL DISEASE: ICD-10-CM

## 2018-01-19 DIAGNOSIS — Z87.39 HISTORY OF GOUT: ICD-10-CM

## 2018-01-19 LAB
ALBUMIN SERPL-MCNC: 4.8 G/DL (ref 3.5–5.2)
ALBUMIN/GLOB SERPL: 1.8 G/DL
ALP SERPL-CCNC: 74 U/L (ref 39–117)
ALT SERPL-CCNC: 20 U/L (ref 1–41)
AST SERPL-CCNC: 16 U/L (ref 1–40)
BILIRUB SERPL-MCNC: 0.3 MG/DL (ref 0.1–1.2)
BUN SERPL-MCNC: 18 MG/DL (ref 6–20)
BUN/CREAT SERPL: 12.2 (ref 7–25)
CALCIUM SERPL-MCNC: 9.7 MG/DL (ref 8.6–10.5)
CHLORIDE SERPL-SCNC: 97 MMOL/L (ref 98–107)
CHOLEST SERPL-MCNC: 224 MG/DL (ref 0–200)
CO2 SERPL-SCNC: 27.7 MMOL/L (ref 22–29)
CREAT SERPL-MCNC: 1.48 MG/DL (ref 0.76–1.27)
GLOBULIN SER CALC-MCNC: 2.6 GM/DL
GLUCOSE SERPL-MCNC: 96 MG/DL (ref 65–99)
HDLC SERPL-MCNC: 39 MG/DL (ref 40–60)
LDLC SERPL CALC-MCNC: 119 MG/DL (ref 0–100)
POTASSIUM SERPL-SCNC: 4.2 MMOL/L (ref 3.5–5.2)
PROT SERPL-MCNC: 7.4 G/DL (ref 6–8.5)
SODIUM SERPL-SCNC: 138 MMOL/L (ref 136–145)
TRIGL SERPL-MCNC: 328 MG/DL (ref 0–150)
VLDLC SERPL-MCNC: 65.6 MG/DL (ref 5–40)

## 2018-01-19 PROCEDURE — 99214 OFFICE O/P EST MOD 30 MIN: CPT | Performed by: INTERNAL MEDICINE

## 2018-01-19 RX ORDER — FEBUXOSTAT 40 MG/1
40 TABLET, FILM COATED ORAL DAILY
Qty: 30 TABLET | Refills: 6 | Status: SHIPPED | OUTPATIENT
Start: 2018-01-19 | End: 2018-05-22 | Stop reason: SDUPTHER

## 2018-01-19 RX ORDER — AMLODIPINE BESYLATE 5 MG/1
5 TABLET ORAL DAILY
Qty: 30 TABLET | Refills: 6 | Status: SHIPPED | OUTPATIENT
Start: 2018-01-19 | End: 2018-08-04 | Stop reason: SDUPTHER

## 2018-01-19 NOTE — PROGRESS NOTES
"Subjective   Isma De Luna is a 47 y.o. male here for   Chief Complaint   Patient presents with   • Hyperlipidemia     4 month follow-up   • Hypertension   .    Vitals:    01/19/18 1510   BP: 158/90   BP Location: Left arm   Patient Position: Sitting   Cuff Size: Adult   Weight: 111 kg (244 lb 6.4 oz)   Height: 169.5 cm (66.75\")       Body mass index is 38.57 kg/(m^2).    Hyperlipidemia   This is a chronic problem. The current episode started more than 1 year ago. The problem is controlled. Recent lipid tests were reviewed and are normal. He has no history of diabetes. Pertinent negatives include no chest pain or shortness of breath.   Hypertension   This is a chronic problem. The current episode started more than 1 year ago. The problem is unchanged. The problem is controlled. Associated symptoms include neck pain (from tumor). Pertinent negatives include no chest pain, palpitations or shortness of breath.        The following portions of the patient's history were reviewed and updated as appropriate: allergies, current medications, past social history and problem list.    Review of Systems   Constitutional: Negative for chills, fatigue and fever.   Respiratory: Negative for cough, shortness of breath and wheezing.    Cardiovascular: Negative for chest pain, palpitations and leg swelling.   Musculoskeletal: Positive for arthralgias (arm pains from tumor) and neck pain (from tumor).   Neurological: Positive for numbness (left arm worse than right).   Psychiatric/Behavioral: Negative for dysphoric mood and sleep disturbance. The patient is not nervous/anxious.       Lexapro changed to cymbalta & gabapentin increased by dr lemos (pain management).    Objective   Physical Exam   Constitutional: He appears well-developed and well-nourished. No distress.   Cardiovascular: Normal rate, regular rhythm and normal heart sounds.    Pulmonary/Chest: No respiratory distress. He has no wheezes. He has no rales. He exhibits no " tenderness.   Musculoskeletal: He exhibits no edema.   Psychiatric: He has a normal mood and affect. His behavior is normal.   Nursing note and vitals reviewed.      Assessment/Plan   Diagnoses and all orders for this visit:    Essential hypertension  Comments:  BP high for 6 mos - need to add amlodipine to losartan - need wt loss with diet/exercise  Orders:  -     Lipid Panel; Future  -     Comprehensive Metabolic Panel; Future  -     amLODIPine (NORVASC) 5 MG tablet; Take 1 tablet by mouth Daily.  -     Basic Metabolic Panel; Future  -     Lipid Panel  -     Comprehensive Metabolic Panel    Other hyperlipidemia  Comments:  need diet/exercise  Orders:  -     Lipid Panel; Future  -     Comprehensive Metabolic Panel; Future  -     Lipid Panel  -     Comprehensive Metabolic Panel    Bilateral carpal tunnel syndrome  Comments:  proven by EMG - need splints as much as possible - take B vitamins    Other chronic pain  Comments:  continue cymbalta & gabapentin per dr lemos (lyrica did not help)    Screening for viral disease  -     HIV-1/O/2 Ag/Ab w Reflex; Future  -     HIV-1/O/2 Ag/Ab w Reflex    History of gout  Comments:  change allopurinol to uloric (renal insuff) - need rechk uric acid in 2-3 mos  Orders:  -     febuxostat (ULORIC) 40 MG tablet; Take 1 tablet by mouth Daily. Instead of allopurinol  -     Uric Acid; Future  -     Basic Metabolic Panel; Future    Chronic renal impairment, stage 3 (moderate)  Comments:  s/p nephrectomy for renal CA - keep incr fluids & avoid NSAID,etc

## 2018-01-20 LAB — HIV 1+2 AB+HIV1 P24 AG SERPL QL IA: NON REACTIVE

## 2018-01-22 ENCOUNTER — TELEPHONE (OUTPATIENT)
Dept: INTERNAL MEDICINE | Facility: CLINIC | Age: 48
End: 2018-01-22

## 2018-01-22 NOTE — TELEPHONE ENCOUNTER
----- Message from Maria M Long sent at 1/22/2018 10:14 AM EST -----  Contact: pt FYI  Pt was seen Friday, and the new medication to substitute febuxostat (ULORIC) 40 MG tablet  Was way too expensive.    Pt# [phone]

## 2018-01-22 NOTE — TELEPHONE ENCOUNTER
pls ask him to go back to allopurinol if uloric too costly - did he look up [email or web address]?  (coupons)

## 2018-01-22 NOTE — TELEPHONE ENCOUNTER
Patient would like to have a cheaper medication sent in since Uloric is too expensive.    Thank you,    Saad

## 2018-01-24 NOTE — TELEPHONE ENCOUNTER
Mr. De Luna was informed of this and stated he would go online and se about the savings card for Uloric.

## 2018-03-20 ENCOUNTER — LAB (OUTPATIENT)
Dept: INTERNAL MEDICINE | Facility: CLINIC | Age: 48
End: 2018-03-20

## 2018-03-20 DIAGNOSIS — I10 ESSENTIAL HYPERTENSION: ICD-10-CM

## 2018-03-20 DIAGNOSIS — Z87.39 HISTORY OF GOUT: ICD-10-CM

## 2018-03-20 LAB
ANION GAP SERPL CALCULATED.3IONS-SCNC: 12 MMOL/L
BUN BLD-MCNC: 14 MG/DL (ref 6–20)
BUN/CREAT SERPL: 9 (ref 7–25)
CALCIUM SPEC-SCNC: 9.5 MG/DL (ref 8.6–10.5)
CHLORIDE SERPL-SCNC: 102 MMOL/L (ref 98–107)
CO2 SERPL-SCNC: 27 MMOL/L (ref 22–29)
CREAT BLD-MCNC: 1.55 MG/DL (ref 0.76–1.27)
GFR SERPL CREATININE-BSD FRML MDRD: 48 ML/MIN/1.73
GLUCOSE BLD-MCNC: 108 MG/DL (ref 65–99)
POTASSIUM BLD-SCNC: 4.4 MMOL/L (ref 3.5–5.2)
SODIUM BLD-SCNC: 141 MMOL/L (ref 136–145)
URATE SERPL-MCNC: 4.9 MG/DL (ref 3.4–7)

## 2018-03-20 PROCEDURE — 84550 ASSAY OF BLOOD/URIC ACID: CPT | Performed by: INTERNAL MEDICINE

## 2018-03-20 PROCEDURE — 80048 BASIC METABOLIC PNL TOTAL CA: CPT | Performed by: INTERNAL MEDICINE

## 2018-03-20 PROCEDURE — 36415 COLL VENOUS BLD VENIPUNCTURE: CPT | Performed by: INTERNAL MEDICINE

## 2018-03-22 ENCOUNTER — OFFICE VISIT (OUTPATIENT)
Dept: PAIN MEDICINE | Facility: CLINIC | Age: 48
End: 2018-03-22

## 2018-03-22 VITALS
TEMPERATURE: 98.4 F | SYSTOLIC BLOOD PRESSURE: 149 MMHG | WEIGHT: 245 LBS | HEART RATE: 70 BPM | BODY MASS INDEX: 37.13 KG/M2 | OXYGEN SATURATION: 97 % | DIASTOLIC BLOOD PRESSURE: 98 MMHG | RESPIRATION RATE: 18 BRPM | HEIGHT: 68 IN

## 2018-03-22 DIAGNOSIS — Q85.00 NEUROFIBROMATOSIS (HCC): ICD-10-CM

## 2018-03-22 DIAGNOSIS — M25.519 CHRONIC SHOULDER PAIN, UNSPECIFIED LATERALITY: ICD-10-CM

## 2018-03-22 DIAGNOSIS — Z79.899 ENCOUNTER FOR LONG-TERM (CURRENT) USE OF HIGH-RISK MEDICATION: ICD-10-CM

## 2018-03-22 DIAGNOSIS — G89.29 OTHER CHRONIC PAIN: Primary | ICD-10-CM

## 2018-03-22 DIAGNOSIS — G54.0 BRACHIAL PLEXUS NEUROPATHY: ICD-10-CM

## 2018-03-22 DIAGNOSIS — G89.29 CHRONIC SHOULDER PAIN, UNSPECIFIED LATERALITY: ICD-10-CM

## 2018-03-22 PROCEDURE — 99214 OFFICE O/P EST MOD 30 MIN: CPT | Performed by: NURSE PRACTITIONER

## 2018-04-04 DIAGNOSIS — K21.9 GASTROESOPHAGEAL REFLUX DISEASE WITHOUT ESOPHAGITIS: ICD-10-CM

## 2018-04-05 RX ORDER — OMEPRAZOLE 40 MG/1
CAPSULE, DELAYED RELEASE ORAL
Qty: 90 CAPSULE | Refills: 0 | Status: SHIPPED | OUTPATIENT
Start: 2018-04-05 | End: 2018-07-06 | Stop reason: SDUPTHER

## 2018-05-22 ENCOUNTER — OFFICE VISIT (OUTPATIENT)
Dept: INTERNAL MEDICINE | Facility: CLINIC | Age: 48
End: 2018-05-22

## 2018-05-22 VITALS
OXYGEN SATURATION: 98 % | DIASTOLIC BLOOD PRESSURE: 86 MMHG | SYSTOLIC BLOOD PRESSURE: 136 MMHG | WEIGHT: 239 LBS | HEIGHT: 67 IN | HEART RATE: 76 BPM | BODY MASS INDEX: 37.51 KG/M2

## 2018-05-22 DIAGNOSIS — F39 MOOD DISORDER (HCC): ICD-10-CM

## 2018-05-22 DIAGNOSIS — Q85.00 NEUROFIBROMATOSIS (HCC): ICD-10-CM

## 2018-05-22 DIAGNOSIS — I10 ESSENTIAL HYPERTENSION: Primary | ICD-10-CM

## 2018-05-22 DIAGNOSIS — Z87.39 HISTORY OF GOUT: ICD-10-CM

## 2018-05-22 DIAGNOSIS — G89.29 OTHER CHRONIC PAIN: ICD-10-CM

## 2018-05-22 PROCEDURE — 99213 OFFICE O/P EST LOW 20 MIN: CPT | Performed by: INTERNAL MEDICINE

## 2018-05-22 RX ORDER — FEBUXOSTAT 40 MG/1
40 TABLET, FILM COATED ORAL DAILY
Qty: 90 TABLET | Refills: 3 | Status: SHIPPED | OUTPATIENT
Start: 2018-05-22 | End: 2019-05-19 | Stop reason: SDUPTHER

## 2018-05-22 RX ORDER — OXYCODONE HYDROCHLORIDE 15 MG/1
1 TABLET ORAL EVERY 6 HOURS PRN
COMMUNITY
Start: 2018-04-15 | End: 2018-07-18

## 2018-05-22 RX ORDER — CHOLECALCIFEROL (VITAMIN D3) 125 MCG
1000 CAPSULE ORAL DAILY
COMMUNITY

## 2018-05-22 RX ORDER — LANOLIN ALCOHOL/MO/W.PET/CERES
100 CREAM (GRAM) TOPICAL DAILY
COMMUNITY
End: 2021-06-25 | Stop reason: ALTCHOICE

## 2018-05-22 NOTE — PROGRESS NOTES
"Subjective   Isma De Luna is a 48 y.o. male here for   Chief Complaint   Patient presents with   • Hypertension   • Heartburn   .    Vitals:    05/22/18 1534   BP: 136/86   BP Location: Right arm   Patient Position: Sitting   Cuff Size: Adult   Pulse: 76   SpO2: 98%   Weight: 108 kg (239 lb)   Height: 170.2 cm (67\")       Body mass index is 37.43 kg/m².    Hypertension   This is a chronic problem. The current episode started more than 1 year ago. The problem is unchanged. The problem is controlled. Pertinent negatives include no chest pain, palpitations or shortness of breath.   Heartburn   He complains of heartburn. He reports no chest pain, no coughing or no wheezing. This is a chronic problem. The problem occurs occasionally. Associated symptoms include fatigue.        The following portions of the patient's history were reviewed and updated as appropriate: allergies, current medications, past social history and problem list.    Review of Systems   Constitutional: Positive for fatigue. Negative for chills and fever.   Respiratory: Negative for cough, shortness of breath and wheezing.    Cardiovascular: Negative for chest pain, palpitations and leg swelling.   Gastrointestinal: Positive for heartburn.   Psychiatric/Behavioral: Positive for sleep disturbance. Negative for dysphoric mood. The patient is not nervous/anxious.        Objective   Physical Exam   Constitutional: He appears well-developed and well-nourished. No distress.   Cardiovascular: Normal rate, regular rhythm and normal heart sounds.    Pulmonary/Chest: No respiratory distress. He has no wheezes. He has no rales. He exhibits no tenderness.   Musculoskeletal: He exhibits no edema.   Psychiatric: He has a normal mood and affect. His behavior is normal.   Nursing note and vitals reviewed.      Assessment/Plan   Diagnoses and all orders for this visit:    Essential hypertension  Comments:  stable - call if bp over 140/90    Neurofibromatosis    History " of gout  Comments:  change allopurinol to uloric (renal insuff) - need rechk uric acid in 2-3 mos  Orders:  -     febuxostat (ULORIC) 40 MG tablet; Take 1 tablet by mouth Daily. Instead of allopurinol    Mood disorder  Comments:  stable    Other chronic pain  Comments:  stable    Other orders  -     oxyCODONE (ROXICODONE) 15 MG immediate release tablet; Take 1 tablet by mouth Every 6 (Six) Hours As Needed.  -     Cholecalciferol (VITAMIN D3) 2000 units tablet; Take  by mouth Daily.  -     vitamin B-6 (PYRIDOXINE) 50 MG tablet; Take 100 mg by mouth Daily.

## 2018-06-20 ENCOUNTER — APPOINTMENT (OUTPATIENT)
Dept: WOMENS IMAGING | Facility: HOSPITAL | Age: 48
End: 2018-06-20

## 2018-06-20 ENCOUNTER — OFFICE VISIT (OUTPATIENT)
Dept: INTERNAL MEDICINE | Facility: CLINIC | Age: 48
End: 2018-06-20

## 2018-06-20 VITALS
HEART RATE: 105 BPM | OXYGEN SATURATION: 98 % | SYSTOLIC BLOOD PRESSURE: 116 MMHG | DIASTOLIC BLOOD PRESSURE: 78 MMHG | TEMPERATURE: 100.3 F | WEIGHT: 235 LBS | BODY MASS INDEX: 36.81 KG/M2

## 2018-06-20 DIAGNOSIS — R50.9 FEVER OF UNKNOWN ORIGIN: Primary | ICD-10-CM

## 2018-06-20 DIAGNOSIS — R39.15 URINARY URGENCY: ICD-10-CM

## 2018-06-20 LAB
ALBUMIN SERPL-MCNC: 4.4 G/DL (ref 3.5–5.2)
ALBUMIN/GLOB SERPL: 1.5 G/DL
ALP SERPL-CCNC: 72 U/L (ref 39–117)
ALT SERPL W P-5'-P-CCNC: 27 U/L (ref 1–41)
ANION GAP SERPL CALCULATED.3IONS-SCNC: 15 MMOL/L
AST SERPL-CCNC: 32 U/L (ref 1–40)
BASOPHILS # BLD AUTO: 0.03 10*3/MM3 (ref 0–0.2)
BASOPHILS NFR BLD AUTO: 0.4 % (ref 0–2)
BILIRUB SERPL-MCNC: 0.5 MG/DL (ref 0.1–1.2)
BILIRUB UR QL STRIP: NEGATIVE
BUN BLD-MCNC: 18 MG/DL (ref 6–20)
BUN/CREAT SERPL: 10.9 (ref 7–25)
CALCIUM SPEC-SCNC: 9.6 MG/DL (ref 8.6–10.5)
CHLORIDE SERPL-SCNC: 96 MMOL/L (ref 98–107)
CLARITY UR: CLEAR
CO2 SERPL-SCNC: 27 MMOL/L (ref 22–29)
COLOR UR: YELLOW
CREAT BLD-MCNC: 1.65 MG/DL (ref 0.76–1.27)
DEPRECATED RDW RBC AUTO: 38.8 FL (ref 37–54)
EOSINOPHIL # BLD AUTO: 0.02 10*3/MM3 (ref 0–0.7)
EOSINOPHIL NFR BLD AUTO: 0.3 % (ref 0–5)
ERYTHROCYTE [DISTWIDTH] IN BLOOD BY AUTOMATED COUNT: 12.7 % (ref 11.5–15)
GFR SERPL CREATININE-BSD FRML MDRD: 45 ML/MIN/1.73
GLOBULIN UR ELPH-MCNC: 3 GM/DL
GLUCOSE BLD-MCNC: 106 MG/DL (ref 65–99)
GLUCOSE UR STRIP-MCNC: NEGATIVE MG/DL
HCT VFR BLD AUTO: 38.5 % (ref 40.1–51)
HGB BLD-MCNC: 13 G/DL (ref 13.7–17.5)
HGB UR QL STRIP.AUTO: NEGATIVE
KETONES UR QL STRIP: NEGATIVE
LEUKOCYTE ESTERASE UR QL STRIP.AUTO: NEGATIVE
LYMPHOCYTES # BLD AUTO: 0.93 10*3/MM3 (ref 0.8–7)
LYMPHOCYTES NFR BLD AUTO: 12.7 % (ref 10–60)
MCH RBC QN AUTO: 28.8 PG (ref 26–34)
MCHC RBC AUTO-ENTMCNC: 33.8 G/DL (ref 31–37)
MCV RBC AUTO: 85.4 FL (ref 80–100)
MONOCYTES # BLD AUTO: 0.8 10*3/MM3 (ref 0–1)
MONOCYTES NFR BLD AUTO: 10.9 % (ref 0–13)
NEUTROPHILS # BLD AUTO: 5.56 10*3/MM3 (ref 1–11)
NEUTROPHILS NFR BLD AUTO: 75.7 % (ref 30–85)
NITRITE UR QL STRIP: NEGATIVE
PH UR STRIP.AUTO: 6 [PH] (ref 5–8)
PLATELET # BLD AUTO: 191 10*3/MM3 (ref 150–450)
PMV BLD AUTO: 10.8 FL (ref 6–12)
POTASSIUM BLD-SCNC: 4 MMOL/L (ref 3.5–5.2)
PROT SERPL-MCNC: 7.4 G/DL (ref 6–8.5)
PROT UR QL STRIP: ABNORMAL
RBC # BLD AUTO: 4.51 10*6/MM3 (ref 4.63–6.08)
SODIUM BLD-SCNC: 138 MMOL/L (ref 136–145)
SP GR UR STRIP: 1.02 (ref 1–1.03)
UROBILINOGEN UR QL STRIP: ABNORMAL
WBC NRBC COR # BLD: 7.34 10*3/MM3 (ref 5–10)

## 2018-06-20 PROCEDURE — 85025 COMPLETE CBC W/AUTO DIFF WBC: CPT | Performed by: NURSE PRACTITIONER

## 2018-06-20 PROCEDURE — 80053 COMPREHEN METABOLIC PANEL: CPT | Performed by: NURSE PRACTITIONER

## 2018-06-20 PROCEDURE — 81003 URINALYSIS AUTO W/O SCOPE: CPT | Performed by: NURSE PRACTITIONER

## 2018-06-20 PROCEDURE — 71046 X-RAY EXAM CHEST 2 VIEWS: CPT | Performed by: NURSE PRACTITIONER

## 2018-06-20 PROCEDURE — 71046 X-RAY EXAM CHEST 2 VIEWS: CPT | Performed by: RADIOLOGY

## 2018-06-20 PROCEDURE — 99213 OFFICE O/P EST LOW 20 MIN: CPT | Performed by: NURSE PRACTITIONER

## 2018-06-20 PROCEDURE — 36415 COLL VENOUS BLD VENIPUNCTURE: CPT | Performed by: NURSE PRACTITIONER

## 2018-06-21 ENCOUNTER — TELEPHONE (OUTPATIENT)
Dept: INTERNAL MEDICINE | Facility: CLINIC | Age: 48
End: 2018-06-21

## 2018-06-21 ENCOUNTER — OFFICE VISIT (OUTPATIENT)
Dept: PAIN MEDICINE | Facility: CLINIC | Age: 48
End: 2018-06-21

## 2018-06-21 VITALS
DIASTOLIC BLOOD PRESSURE: 69 MMHG | OXYGEN SATURATION: 97 % | RESPIRATION RATE: 16 BRPM | WEIGHT: 235 LBS | HEIGHT: 67 IN | HEART RATE: 85 BPM | BODY MASS INDEX: 36.88 KG/M2 | TEMPERATURE: 98.6 F | SYSTOLIC BLOOD PRESSURE: 118 MMHG

## 2018-06-21 DIAGNOSIS — G89.18 ACUTE POST-OPERATIVE PAIN: ICD-10-CM

## 2018-06-21 DIAGNOSIS — I10 HYPERTENSION, BENIGN: ICD-10-CM

## 2018-06-21 DIAGNOSIS — G89.29 OTHER CHRONIC PAIN: Primary | ICD-10-CM

## 2018-06-21 DIAGNOSIS — G54.0 BRACHIAL PLEXUS NEUROPATHY: ICD-10-CM

## 2018-06-21 DIAGNOSIS — D36.11 NEUROFIBROMA OF NECK: ICD-10-CM

## 2018-06-21 DIAGNOSIS — Z79.899 ENCOUNTER FOR LONG-TERM (CURRENT) USE OF HIGH-RISK MEDICATION: ICD-10-CM

## 2018-06-21 DIAGNOSIS — M25.519 CHRONIC SHOULDER PAIN, UNSPECIFIED LATERALITY: ICD-10-CM

## 2018-06-21 DIAGNOSIS — G89.29 CHRONIC SHOULDER PAIN, UNSPECIFIED LATERALITY: ICD-10-CM

## 2018-06-21 PROBLEM — E53.8 LOW VITAMIN B12 LEVEL: Status: ACTIVE | Noted: 2018-03-06

## 2018-06-21 PROBLEM — R79.89 LOW VITAMIN B12 LEVEL: Status: ACTIVE | Noted: 2018-03-06

## 2018-06-21 PROCEDURE — 99214 OFFICE O/P EST MOD 30 MIN: CPT | Performed by: NURSE PRACTITIONER

## 2018-06-21 RX ORDER — LOSARTAN POTASSIUM 100 MG/1
TABLET ORAL
Qty: 90 TABLET | Refills: 0 | Status: SHIPPED | OUTPATIENT
Start: 2018-06-21 | End: 2018-09-20 | Stop reason: SDUPTHER

## 2018-06-21 RX ORDER — GABAPENTIN 800 MG/1
TABLET ORAL
Qty: 90 TABLET | Refills: 3 | Status: SHIPPED | OUTPATIENT
Start: 2018-06-21 | End: 2018-10-27 | Stop reason: SDUPTHER

## 2018-06-21 RX ORDER — HYDROCODONE BITARTRATE AND ACETAMINOPHEN 10; 325 MG/1; MG/1
TABLET ORAL
Qty: 210 TABLET | Refills: 0 | Status: SHIPPED | OUTPATIENT
Start: 2018-06-21 | End: 2018-07-18 | Stop reason: SDUPTHER

## 2018-06-21 NOTE — TELEPHONE ENCOUNTER
Discussed lab results with patient, he reports feeling better with almost complete resolution of fever. His temp was 99 last night and 97.6 this morning (has not had Tylenol since Wednesday morning). Continue to monitor.

## 2018-06-21 NOTE — PROGRESS NOTES
CHIEF COMPLAINT  Pt had 2 benign tumors removed from L side of neck/clavicular area on 4/13/18,and L carpal tunnel surgery  On 3/26/18.  Overall,L shoulder pain is improving since tumor surgery, and Pt has no further scalp pain.     Subjective   Isma De Luna is a 48 y.o. male  who presents to the office for follow-up.He has a history of neck and arm pain related to neurofibromatosis.    Carpal tunnel release 3/26/18 and left neck tumor rescection 4/13/18.  Still does not have complete motor control/function of left arm.  Still in rehab currently.      Complains of pain in his neck and arms. Today his pain is 2/10VAS (ranges from 2-6).     Previously prescribed Hydrocodone 10/325 2-4/day, and gabapentin 800 mg TID and cymbalta 30 mg and Flexeril. Does have occasional itching with the regimen. Does not drive with medication. The regimen helps decrease his pain by 90% normally. ADL's by self.    Since surgery he has been taking Oxycodone 15 mg, takes half to one tablet every 5 hours. Usually takes a whole one after therapy and before bed.  He goes to therapy 2-3 days a week.      Neck Pain    This is a chronic problem. The current episode started more than 1 month ago. The problem occurs constantly. The problem has been waxing and waning (slightly worse since surgery). Associated with: NF. The pain is present in the midline and right side. The quality of the pain is described as aching, cramping and shooting. The pain is at a severity of 2/10. The symptoms are aggravated by twisting. Pertinent negatives include no chest pain, fever, headaches, numbness or weakness. He has tried acetaminophen, bed rest, heat, ice, NSAIDs, oral narcotics, muscle relaxants, neck support and home exercises (hydrocodone) for the symptoms. The treatment provided moderate relief.      PEG Assessment   What number best describes your pain on average in the past week?4  What number best describes how, during the past week, pain has interfered  "with your enjoyment of life?2  What number best describes how, during the past week, pain has interfered with your general activity?  2    The following portions of the patient's history were reviewed and updated as appropriate: allergies, current medications, past family history, past medical history, past social history, past surgical history and problem list.    Review of Systems   Constitutional: Negative for activity change, chills and fever.   Respiratory: Negative for shortness of breath.    Cardiovascular: Negative for chest pain.   Gastrointestinal: Negative for constipation, diarrhea, nausea and vomiting.   Genitourinary: Negative for difficulty urinating and dysuria.   Musculoskeletal: Negative for neck pain.        Shoulder pain   Neurological: Negative for dizziness, weakness, light-headedness, numbness and headaches.   Psychiatric/Behavioral: Positive for sleep disturbance. Negative for confusion, hallucinations, self-injury and suicidal ideas. The patient is not nervous/anxious.      Vitals:    06/21/18 1549   BP: 118/69   Pulse: 85   Resp: 16   Temp: 98.6 °F (37 °C)   SpO2: 97%   Weight: 107 kg (235 lb)   Height: 170.2 cm (67.01\")   PainSc: 2  Comment: L shoulder pain ranges from 2-6/10   PainLoc: Shoulder     Objective   Physical Exam   Constitutional: He is oriented to person, place, and time. Vital signs are normal. He appears well-developed and well-nourished.  Non-toxic appearance.   HENT:   Head: Normocephalic and atraumatic.   Nose: Nose normal.   Eyes: Conjunctivae and lids are normal.   Cardiovascular: Normal rate.    Pulmonary/Chest: Effort normal. No respiratory distress.   Abdominal: Normal appearance.   Musculoskeletal:        Right shoulder: He exhibits tenderness.        Left shoulder: He exhibits tenderness.        Cervical back: He exhibits tenderness.   Left neck surgical scar has healed well      Neurological: He is alert and oriented to person, place, and time. No cranial nerve " deficit. He exhibits abnormal muscle tone (left arm - decreased strength/tone). Gait normal.   Psychiatric: He has a normal mood and affect. His behavior is normal.   Nursing note and vitals reviewed.    Assessment/Plan   Isma was seen today for shoulder pain.    Diagnoses and all orders for this visit:    Other chronic pain    Neurofibroma of neck    Brachial plexus neuropathy  -     HYDROcodone-acetaminophen (NORCO)  MG per tablet; Take 1-2 PO every 4-6 hours PRN pain    Chronic shoulder pain, unspecified laterality    Encounter for long-term (current) use of high-risk medication    Acute post-operative pain      --- Refill Hydrocodone, increasing quantity/frequency for acute post operative pain.  Patient appears stable with current regimen. No adverse effects. Regarding continuation of opioids, there is no evidence of aberrant behavior or any red flags.  The patient continues with appropriate response to opioid therapy. ADL's remain intact by self.   --- The urine drug screen confirmation from 11/6/17 has been reviewed and the result is appropriate based on patient history and BIRGIT report  --- Follow-up 1 month          BIRGIT REPORT  As part of the patient's treatment plan, I am prescribing controlled substances. The patient has been made aware of appropriate use of such medications, including potential risk of somnolence, limited ability to drive and/or work safely, and the potential for dependence or overdose. It has also bee made clear that these medications are for use by this patient only, without concomitant use of alcohol or other substances unless prescribed.     Patient has completed prescribing agreement detailing terms of continued prescribing of controlled substances, including monitoring BIRGIT reports, urine drug screening, and pill counts if necessary. The patient is aware that inappropriate use will results in cessation of prescribing such medications.    BIRGIT report has been reviewed  and scanned into the patient's chart.    As the clinician, I personally reviewed the BIRGIT from 6/20/2018 while the patient was in the office today.    History and physical exam exhibit continued safe and appropriate use of controlled substances.    EMR Dragon/Transcription disclaimer:   Much of this encounter note is an electronic transcription/translation of spoken language to printed text. The electronic translation of spoken language may permit erroneous, or at times, nonsensical words or phrases to be inadvertently transcribed; Although I have reviewed the note for such errors, some may still exist.

## 2018-06-21 NOTE — PROGRESS NOTES
Subjective   Isma De Luna is a 48 y.o. male who presents due to an elevated temperature.    He c/oa low grade fever with generalized malaise and fatigue over the past several days. However, he states his fever was 102.5 last night. He does c/o loose, watery stools Sunday and Monday which have since resolved. Denies abdominal pain.       Fever    This is a new problem. The current episode started in the past 7 days. The problem occurs intermittently. The problem has been waxing and waning. The maximum temperature noted was 102 to 102.9 F. The temperature was taken using an axillary reading. Pertinent negatives include no abdominal pain, chest pain, congestion, coughing, diarrhea, ear pain, headaches, muscle aches, nausea, rash, sore throat, urinary pain, vomiting or wheezing. He has tried acetaminophen for the symptoms. The treatment provided mild relief.   Risk factors: hx of cancer    Risk factors: no recent sickness         The following portions of the patient's history were reviewed and updated as appropriate: allergies, current medications, past social history and problem list.    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   • Renal cancer    • Schwannoma    • Vitamin B12 deficiency          Current Outpatient Prescriptions:   •  amLODIPine (NORVASC) 5 MG tablet, Take 1 tablet by mouth Daily., Disp: 30 tablet, Rfl: 6  •  B Complex-Biotin-FA (HM VITAMIN B100 COMPLEX PO), Take 1 tablet by mouth daily., Disp: , Rfl:   •  carbonyl iron (FEOSOL) 45 MG tablet tablet, Take  by mouth daily., Disp: , Rfl:   •  Cholecalciferol (VITAMIN D3) 2000 units tablet, Take  by mouth Daily., Disp: , Rfl:   •  Cyanocobalamin 1000 MCG sublingual tablet, Place 1 tablet under the  tongue daily., Disp: , Rfl:   •  cyclobenzaprine (FLEXERIL) 10 MG tablet, Take 1 tablet by mouth 3 (Three) Times a Day As Needed for Muscle Spasms., Disp: 90 tablet, Rfl: 4  •  DULoxetine (CYMBALTA) 30 MG capsule, Take 1 capsule by mouth 2 (Two) Times a Day., Disp: 60 capsule, Rfl: 11  •  febuxostat (ULORIC) 40 MG tablet, Take 1 tablet by mouth Daily. Instead of allopurinol, Disp: 90 tablet, Rfl: 3  •  fexofenadine (ALLEGRA) 180 MG tablet, Take 180 mg by mouth daily., Disp: , Rfl:   •  gabapentin (NEURONTIN) 800 MG tablet, TAKE 1 TABLET BY MOUTH THREE TIMES DAILY, Disp: 90 tablet, Rfl: 0  •  HYDROcodone-acetaminophen (NORCO)  MG per tablet, 1 tab po q 6-8 hrs prn severe painful flareup (Patient taking differently: 1 tab po q 6-8 hrs prn severe painful flareup(5/22/18-ON HOLD WHILE RECOVERING FROM SURGERY-taking Oxycodone instead)), Disp: 100 tablet, Rfl: 0  •  losartan (COZAAR) 100 MG tablet, TAKE 1 TABLET BY MOUTH DAILY, Disp: 90 tablet, Rfl: 1  •  Multiple Vitamin (MULTI-VITAMIN DAILY PO), Take  by mouth Daily., Disp: , Rfl:   •  omeprazole (priLOSEC) 40 MG capsule, TAKE 1 CAPSULE BY MOUTH DAILY, Disp: 90 capsule, Rfl: 0  •  oxyCODONE (ROXICODONE) 15 MG immediate release tablet, Take 1 tablet by mouth Every 6 (Six) Hours As Needed., Disp: , Rfl:   •  sildenafil (REVATIO) 20 MG tablet, Take 1 tablet by mouth Daily., Disp: 90 tablet, Rfl: 3  •  vitamin B-6 (PYRIDOXINE) 50 MG tablet, Take 100 mg by mouth Daily., Disp: , Rfl:     Allergies   Allergen Reactions   • Nsaids Other (See Comments)     HX: Kidney CA; radical left nephrectomy, partial right.   • Pollen Extract Other (See Comments)     Other reaction(s): Other (See Comments)  Pollen, rag weed        Review of Systems   Constitutional: Positive for fever. Negative for appetite change, chills and fatigue.   HENT: Negative for congestion, ear discharge, ear pain, facial swelling, hearing loss, postnasal drip, rhinorrhea, sinus pressure, sneezing, sore  throat and tinnitus.    Respiratory: Negative for cough, chest tightness, shortness of breath and wheezing.    Cardiovascular: Negative for chest pain, palpitations and leg swelling.   Gastrointestinal: Negative for abdominal pain, diarrhea, nausea and vomiting.   Genitourinary: Positive for urgency. Negative for difficulty urinating, dysuria and frequency.   Musculoskeletal: Negative for neck pain and neck stiffness.   Skin: Negative for rash.   Neurological: Negative for headaches.   Hematological: Negative for adenopathy.       Objective   Vitals:    06/20/18 1432   BP: 116/78   BP Location: Left arm   Patient Position: Sitting   Cuff Size: Adult   Pulse: 105   Temp: 100.3 °F (37.9 °C)   SpO2: 98%   Weight: 107 kg (235 lb)     Physical Exam   Constitutional: He appears well-developed and well-nourished. He is cooperative. He does not have a sickly appearance. He does not appear ill.   HENT:   Head: Normocephalic.   Right Ear: Hearing, tympanic membrane and external ear normal.   Left Ear: Hearing, tympanic membrane and external ear normal.   Nose: Nose normal. No mucosal edema, rhinorrhea, sinus tenderness or nasal deformity. Right sinus exhibits no maxillary sinus tenderness and no frontal sinus tenderness. Left sinus exhibits no maxillary sinus tenderness and no frontal sinus tenderness.   Mouth/Throat: Oropharynx is clear and moist and mucous membranes are normal. Normal dentition.   Eyes: Conjunctivae and lids are normal. Right eye exhibits no discharge and no exudate. Left eye exhibits no discharge and no exudate.   Neck: Trachea normal and normal range of motion. Carotid bruit is not present. No edema present. No thyroid mass and no thyromegaly present.   Cardiovascular: Regular rhythm, normal heart sounds and normal pulses.    No murmur heard.  Pulmonary/Chest: Breath sounds normal. No respiratory distress. He has no decreased breath sounds. He has no wheezes. He has no rhonchi. He has no rales.    Abdominal: Soft. Bowel sounds are normal. There is no tenderness.   Lymphadenopathy:        Head (right side): No submental, no submandibular, no tonsillar, no preauricular, no posterior auricular and no occipital adenopathy present.        Head (left side): No submental, no submandibular, no tonsillar, no preauricular, no posterior auricular and no occipital adenopathy present.   Neurological: He is alert.   Skin: Skin is warm, dry and intact. No cyanosis. Nails show no clubbing.       Assessment/Plan   Isma was seen today for fatigue, diarrhea, gas, fever and chills.    Diagnoses and all orders for this visit:    Fever of unknown origin  Comments:  no acute abnl on CXR-will send for review, CBC shows WBC 7,000; will await CMP and continue to monitor  Orders:  -     XR Chest 2 View  -     CBC Auto Differential; Future  -     Comprehensive Metabolic Panel; Future  -     CBC Auto Differential  -     Comprehensive Metabolic Panel    Urinary urgency  Comments:  UA is normal  Orders:  -     Urinalysis With / Microscopic If Indicated (No Culture) - Urine, Clean Catch

## 2018-06-27 ENCOUNTER — RESULTS ENCOUNTER (OUTPATIENT)
Dept: PAIN MEDICINE | Facility: CLINIC | Age: 48
End: 2018-06-27

## 2018-06-27 DIAGNOSIS — M25.519 CHRONIC SHOULDER PAIN, UNSPECIFIED LATERALITY: ICD-10-CM

## 2018-06-27 DIAGNOSIS — G89.29 CHRONIC SHOULDER PAIN, UNSPECIFIED LATERALITY: ICD-10-CM

## 2018-06-27 DIAGNOSIS — Z79.899 ENCOUNTER FOR LONG-TERM (CURRENT) USE OF HIGH-RISK MEDICATION: ICD-10-CM

## 2018-06-27 DIAGNOSIS — G89.18 ACUTE POST-OPERATIVE PAIN: ICD-10-CM

## 2018-06-27 DIAGNOSIS — D36.11 NEUROFIBROMA OF NECK: ICD-10-CM

## 2018-06-27 DIAGNOSIS — G54.0 BRACHIAL PLEXUS NEUROPATHY: ICD-10-CM

## 2018-06-27 DIAGNOSIS — G89.29 OTHER CHRONIC PAIN: ICD-10-CM

## 2018-07-01 DIAGNOSIS — R93.89 ABNORMAL CXR: Primary | ICD-10-CM

## 2018-07-01 PROCEDURE — 71047 X-RAY EXAM CHEST 3 VIEWS: CPT | Performed by: NURSE PRACTITIONER

## 2018-07-06 DIAGNOSIS — K21.9 GASTROESOPHAGEAL REFLUX DISEASE WITHOUT ESOPHAGITIS: ICD-10-CM

## 2018-07-06 RX ORDER — OMEPRAZOLE 40 MG/1
CAPSULE, DELAYED RELEASE ORAL
Qty: 90 CAPSULE | Refills: 0 | Status: SHIPPED | OUTPATIENT
Start: 2018-07-06 | End: 2018-10-05 | Stop reason: SDUPTHER

## 2018-07-09 ENCOUNTER — APPOINTMENT (OUTPATIENT)
Dept: WOMENS IMAGING | Facility: HOSPITAL | Age: 48
End: 2018-07-09

## 2018-07-09 PROCEDURE — 71047 X-RAY EXAM CHEST 3 VIEWS: CPT | Performed by: RADIOLOGY

## 2018-07-18 ENCOUNTER — OFFICE VISIT (OUTPATIENT)
Dept: PAIN MEDICINE | Facility: CLINIC | Age: 48
End: 2018-07-18

## 2018-07-18 VITALS
TEMPERATURE: 98.4 F | HEART RATE: 91 BPM | HEIGHT: 67 IN | OXYGEN SATURATION: 95 % | BODY MASS INDEX: 38.14 KG/M2 | RESPIRATION RATE: 18 BRPM | WEIGHT: 243 LBS | SYSTOLIC BLOOD PRESSURE: 118 MMHG | DIASTOLIC BLOOD PRESSURE: 79 MMHG

## 2018-07-18 DIAGNOSIS — G89.29 OTHER CHRONIC PAIN: Primary | ICD-10-CM

## 2018-07-18 DIAGNOSIS — G89.18 POST-OP PAIN: ICD-10-CM

## 2018-07-18 DIAGNOSIS — G54.0 BRACHIAL PLEXUS NEUROPATHY: ICD-10-CM

## 2018-07-18 DIAGNOSIS — Q85.00 NEUROFIBROMATOSIS (HCC): ICD-10-CM

## 2018-07-18 DIAGNOSIS — Z79.899 ENCOUNTER FOR LONG-TERM (CURRENT) USE OF HIGH-RISK MEDICATION: ICD-10-CM

## 2018-07-18 PROCEDURE — 99214 OFFICE O/P EST MOD 30 MIN: CPT | Performed by: NURSE PRACTITIONER

## 2018-07-18 RX ORDER — HYDROCODONE BITARTRATE AND ACETAMINOPHEN 10; 325 MG/1; MG/1
TABLET ORAL
Qty: 180 TABLET | Refills: 0 | Status: SHIPPED | OUTPATIENT
Start: 2018-07-18 | End: 2018-09-20 | Stop reason: SDUPTHER

## 2018-07-18 NOTE — PROGRESS NOTES
"CHIEF COMPLAINT  Follow-up for shoulder pain. Mr. De Luna states that his shoulder pain has improved since his last appt.    Subjective   Isma De Luna is a 48 y.o. male  who presents to the office for follow-up.He has a history of chronic pain due to NF and also recent surgery. Reports his left shoulder pain is improved sicne last office visit..    Had left CTS release 3-26-18. On 4-13-18, had neck tumor x2 dissection and debunkment. Is still going to PT. Is having some dry needling. Having some LUE bicep atrophy.    Complains of pain in his neck and arms. Today his pain is 3/10VAS. Describes his pain as continuous but improving. Is still having some left shoulder pain, especially with therapy and care-giving for brother.  His insurance would not cover Hydrocodone 10/325 8/day. His pharmacy was able to change to 6-7/day. Has been taking Hydrocodone 10/325 6/day, Cymbalta 30 mg daily, Flexeril 10 mg prn and gabapentin 800 mg TID.  Denies any side effects from this.  Admits he is not taking Flexeril regularly. It makes him sleepy. His sleep has not been great. The Hydrocodone helps decrease his pain by half. ADL's by self.     Cannot takes NSAIDS due to \"having only three quarters of a kidney left.\"     Neck Pain    This is a chronic problem. The current episode started more than 1 month ago. The problem occurs constantly. The problem has been waxing and waning (unchanged since last office visit). Associated with: NF. The pain is present in the midline and right side. The quality of the pain is described as aching, cramping and shooting. The pain is at a severity of 3/10 (left shoulder). The pain is mild. The symptoms are aggravated by twisting. Associated symptoms include numbness (left upper arm). Pertinent negatives include no fever, headaches or weakness. He has tried acetaminophen, bed rest, heat, ice, NSAIDs, oral narcotics, muscle relaxants, neck support and home exercises (hydrocodone) for the symptoms. The " "treatment provided moderate relief.      PEG Assessment   What number best describes your pain on average in the past week?4  What number best describes how, during the past week, pain has interfered with your enjoyment of life?4  What number best describes how, during the past week, pain has interfered with your general activity?  1    The following portions of the patient's history were reviewed and updated as appropriate: allergies, current medications, past family history, past medical history, past social history, past surgical history and problem list.    Review of Systems   Constitutional: Negative for fatigue and fever.   HENT: Negative for congestion.    Eyes: Negative for visual disturbance.   Respiratory: Negative for cough, shortness of breath and wheezing.    Cardiovascular: Negative.    Gastrointestinal: Negative for constipation and diarrhea.   Genitourinary: Negative for difficulty urinating.   Musculoskeletal: Positive for arthralgias (left shoulder) and neck pain.   Neurological: Positive for numbness (left upper arm). Negative for weakness and headaches.   Psychiatric/Behavioral: Positive for sleep disturbance. Negative for suicidal ideas. The patient is not nervous/anxious.        Vitals:    07/18/18 1607   BP: 118/79   Pulse: 91   Resp: 18   Temp: 98.4 °F (36.9 °C)   SpO2: 95%   Weight: 110 kg (243 lb)   Height: 170.2 cm (67\")   PainSc:   3   PainLoc: Shoulder     Objective   Physical Exam   Constitutional: He is oriented to person, place, and time. Vital signs are normal. He appears well-developed and well-nourished.  Non-toxic appearance.   HENT:   Head: Normocephalic and atraumatic.   Nose: Nose normal.   Eyes: Conjunctivae and lids are normal.   Neck:   Left lateral neck scar extending into top of shoulder--pink and well-healing   Cardiovascular: Normal rate.    Pulmonary/Chest: Effort normal.   Abdominal: Normal appearance.   Musculoskeletal:   Braces of BUE's   Neurological: He is alert " and oriented to person, place, and time. Gait normal.   Psychiatric: He has a normal mood and affect. His behavior is normal.   Nursing note and vitals reviewed.      Assessment/Plan   Isma was seen today for shoulder pain.    Diagnoses and all orders for this visit:    Other chronic pain    Brachial plexus neuropathy  -     HYDROcodone-acetaminophen (NORCO)  MG per tablet; Take 1-2 PO every 4-6 hours PRN pain    Neurofibromatosis (CMS/HCC)    Encounter for long-term (current) use of high-risk medication    Post-op pain      --- The oral drug screen confirmation from 6-20-18 has been reviewed and the result is APPROPRIATE(positive for oxycodone post-op, negative for Hydrocodone.) based on patient history and BIRGIT report  --- refill Hydrocodone. Patient appears stable with current regimen. No adverse effects. Regarding continuation of opioids, there is no evidence of aberrant behavior or any red flags.  The patient continues with appropriate response to opioid therapy. ADL's remain intact by self.   --- Discussed taking Flexeril more regularly at night.   --- Continue with PT as scheduled.   --- Consider compounded pain creme.   --- Follow-up 1 month or sooner if needed.       BIRGIT REPORT    As part of the patient's treatment plan, I am prescribing controlled substances. The patient has been made aware of appropriate use of such medications, including potential risk of somnolence, limited ability to drive and/or work safely, and the potential for dependence or overdose. It has also bee made clear that these medications are for use by this patient only, without concomitant use of alcohol or other substances unless prescribed.     Patient has completed prescribing agreement detailing terms of continued prescribing of controlled substances, including monitoring BIRGIT reports, urine drug screening, and pill counts if necessary. The patient is aware that inappropriate use will results in cessation of  prescribing such medications.    BIRGIT report has been reviewed and scanned into the patient's chart.    As the clinician, I personally reviewed the BIRGIT from 7-17-18 while the patient was in the office today.    History and physical exam exhibit continued safe and appropriate use of controlled substances.      EMR Dragon/Transcription disclaimer:   Much of this encounter note is an electronic transcription/translation of spoken language to printed text. The electronic translation of spoken language may permit erroneous, or at times, nonsensical words or phrases to be inadvertently transcribed; Although I have reviewed the note for such errors, some may still exist.

## 2018-08-03 ENCOUNTER — OFFICE VISIT (OUTPATIENT)
Dept: INTERNAL MEDICINE | Facility: CLINIC | Age: 48
End: 2018-08-03

## 2018-08-03 VITALS
BODY MASS INDEX: 37.54 KG/M2 | WEIGHT: 239.2 LBS | HEIGHT: 67 IN | DIASTOLIC BLOOD PRESSURE: 88 MMHG | HEART RATE: 77 BPM | RESPIRATION RATE: 16 BRPM | OXYGEN SATURATION: 97 % | SYSTOLIC BLOOD PRESSURE: 144 MMHG | TEMPERATURE: 98.1 F
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
## 2018-08-03 DIAGNOSIS — F41.9 ANXIETY: ICD-10-CM

## 2018-08-03 DIAGNOSIS — M54.50 ACUTE LEFT-SIDED LOW BACK PAIN WITHOUT SCIATICA: ICD-10-CM

## 2018-08-03 PROCEDURE — 99213 OFFICE O/P EST LOW 20 MIN: CPT | Performed by: INTERNAL MEDICINE
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
## 2018-08-04 DIAGNOSIS — I10 ESSENTIAL HYPERTENSION: ICD-10-CM

## 2018-08-07 RX ORDER — AMLODIPINE BESYLATE 5 MG/1
5 TABLET ORAL DAILY
Qty: 30 TABLET | Refills: 0 | Status: SHIPPED | OUTPATIENT
Start: 2018-08-07 | End: 2018-08-30 | Stop reason: SDUPTHER

## 2018-08-17 ENCOUNTER — OFFICE VISIT (OUTPATIENT)
Dept: PAIN MEDICINE | Facility: CLINIC | Age: 48
End: 2018-08-17

## 2018-08-17 VITALS
DIASTOLIC BLOOD PRESSURE: 80 MMHG | WEIGHT: 243 LBS | HEART RATE: 84 BPM | RESPIRATION RATE: 15 BRPM | BODY MASS INDEX: 38.14 KG/M2 | SYSTOLIC BLOOD PRESSURE: 124 MMHG | HEIGHT: 67 IN | OXYGEN SATURATION: 96 % | TEMPERATURE: 97 F

## 2018-08-17 DIAGNOSIS — G54.0 BRACHIAL PLEXUS NEUROPATHY: ICD-10-CM

## 2018-08-17 DIAGNOSIS — Q85.00 NEUROFIBROMATOSIS (HCC): ICD-10-CM

## 2018-08-17 DIAGNOSIS — Z79.899 ENCOUNTER FOR LONG-TERM (CURRENT) USE OF HIGH-RISK MEDICATION: ICD-10-CM

## 2018-08-17 DIAGNOSIS — M54.2 NECK PAIN: ICD-10-CM

## 2018-08-17 DIAGNOSIS — G89.29 OTHER CHRONIC PAIN: Primary | ICD-10-CM

## 2018-08-17 PROCEDURE — 99214 OFFICE O/P EST MOD 30 MIN: CPT | Performed by: NURSE PRACTITIONER

## 2018-08-17 NOTE — PROGRESS NOTES
"CHIEF COMPLAINT  F/U left shoulder pain. States pain is unchanged but that it has been \"acting up\" a little more in his deltoid due to needle stimulation in PT. He is exercising it more and making process with ROM.     Subjective   Isma De Luna is a 48 y.o. male  who presents to the office for follow-up.He has a history of chronic pain related to neurofibromatosis and some acute post op pain as well.    Had left CT release 3-26-18. On 4-13-18, had neck tumor x2 dissection and debunkment. Is still going to PT. Is having some dry needling.  Working on strengthening of Deltoid, bicep strength has improved.      Complains of pain in his neck and arms. Today his pain is 1/10VAS.  He is currently taking hydrocodone 10/325 5-6/day for pain.  He also continues with Gabapentin 800 mg TID, Flexeril PRN, Cymbalta.  Reports moderate reduction in pain with this regimen. Denies adverse reactions.      Neck Pain    This is a chronic problem. The current episode started more than 1 month ago. The problem occurs constantly. The problem has been waxing and waning (slightly worse since surgery). Associated with: NF. The pain is present in the midline and right side. The quality of the pain is described as aching, cramping and shooting. The pain is at a severity of 1/10. The symptoms are aggravated by twisting. Associated symptoms include numbness (left upper arm- improving). Pertinent negatives include no chest pain, fever, headaches or weakness. He has tried acetaminophen, bed rest, heat, ice, NSAIDs, oral narcotics, muscle relaxants, neck support and home exercises (hydrocodone) for the symptoms. The treatment provided moderate relief.      PEG Assessment   What number best describes your pain on average in the past week?4  What number best describes how, during the past week, pain has interfered with your enjoyment of life?1  What number best describes how, during the past week, pain has interfered with your general activity?  " "1    The following portions of the patient's history were reviewed and updated as appropriate: allergies, current medications, past family history, past medical history, past social history, past surgical history and problem list.    Review of Systems   Constitutional: Negative for activity change, chills, fatigue and fever.   HENT: Negative for congestion.    Eyes: Negative for visual disturbance.   Respiratory: Negative for cough, shortness of breath and wheezing.    Cardiovascular: Negative.  Negative for chest pain.   Gastrointestinal: Positive for constipation (states he has been eating more fuber and getting fluids). Negative for diarrhea.   Genitourinary: Negative for difficulty urinating.   Musculoskeletal: Positive for arthralgias (left shoulder) and neck pain.   Neurological: Positive for numbness (left upper arm- improving). Negative for dizziness, weakness, light-headedness and headaches.   Psychiatric/Behavioral: Positive for sleep disturbance. Negative for suicidal ideas. The patient is not nervous/anxious.      Vitals:    08/17/18 1439   BP: 124/80   Pulse: 84   Resp: 15   Temp: 97 °F (36.1 °C)   SpO2: 96%   Weight: 110 kg (243 lb)   Height: 170.2 cm (67.01\")   PainSc:   1   PainLoc: Shoulder  Comment: 1     Objective   Physical Exam   Constitutional: He is oriented to person, place, and time. Vital signs are normal. He appears well-developed and well-nourished.  Non-toxic appearance.   HENT:   Head: Normocephalic and atraumatic.   Nose: Nose normal.   Eyes: Conjunctivae and lids are normal.   Cardiovascular: Normal rate.    Pulmonary/Chest: Effort normal. No respiratory distress.   Abdominal: Normal appearance.   Musculoskeletal:        Left shoulder: He exhibits tenderness.        Cervical back: He exhibits tenderness.   Left neck surgical scar     Neurological: He is alert and oriented to person, place, and time. No cranial nerve deficit. He exhibits abnormal muscle tone (left arm - decreased " strength/tone). Gait normal.   Psychiatric: He has a normal mood and affect. His behavior is normal.   Nursing note and vitals reviewed.    Assessment/Plan   Isma was seen today for shoulder pain.    Diagnoses and all orders for this visit:    Other chronic pain    Neurofibromatosis (CMS/HCC)    Neck pain    Brachial plexus neuropathy    Encounter for long-term (current) use of high-risk medication      --- Continue Hydrocodone. Does not need refill today.  Patient appears stable with current regimen. No adverse effects. Regarding continuation of opioids, there is no evidence of aberrant behavior or any red flags.  The patient continues with appropriate response to opioid therapy. ADL's remain intact by self.   --- The urine drug screen confirmation from 6/21/18 has been reviewed and the result is appropriate based on patient history and BIRGIT report  --- Follow-up 1 month       BIRGIT REPORT    As part of the patient's treatment plan, I am prescribing controlled substances. The patient has been made aware of appropriate use of such medications, including potential risk of somnolence, limited ability to drive and/or work safely, and the potential for dependence or overdose. It has also bee made clear that these medications are for use by this patient only, without concomitant use of alcohol or other substances unless prescribed.     Patient has completed prescribing agreement detailing terms of continued prescribing of controlled substances, including monitoring BIRGIT reports, urine drug screening, and pill counts if necessary. The patient is aware that inappropriate use will results in cessation of prescribing such medications.    BIRGIT report has been reviewed and scanned into the patient's chart.    As the clinician, I personally reviewed the BIRGIT from 8/16/2018 while the patient was in the office today.    History and physical exam exhibit continued safe and appropriate use of controlled substances.    EMR  Dragon/Transcription disclaimer:   Much of this encounter note is an electronic transcription/translation of spoken language to printed text. The electronic translation of spoken language may permit erroneous, or at times, nonsensical words or phrases to be inadvertently transcribed; Although I have reviewed the note for such errors, some may still exist.

## 2018-08-30 DIAGNOSIS — I10 ESSENTIAL HYPERTENSION: ICD-10-CM

## 2018-08-31 RX ORDER — AMLODIPINE BESYLATE 5 MG/1
5 TABLET ORAL DAILY
Qty: 30 TABLET | Refills: 5 | Status: SHIPPED | OUTPATIENT
Start: 2018-08-31 | End: 2019-03-05 | Stop reason: SDUPTHER

## 2018-09-20 ENCOUNTER — OFFICE VISIT (OUTPATIENT)
Dept: PAIN MEDICINE | Facility: CLINIC | Age: 48
End: 2018-09-20

## 2018-09-20 VITALS
OXYGEN SATURATION: 96 % | HEIGHT: 67 IN | RESPIRATION RATE: 18 BRPM | HEART RATE: 75 BPM | BODY MASS INDEX: 37.04 KG/M2 | WEIGHT: 236 LBS | SYSTOLIC BLOOD PRESSURE: 122 MMHG | TEMPERATURE: 98.4 F | DIASTOLIC BLOOD PRESSURE: 77 MMHG

## 2018-09-20 DIAGNOSIS — Q85.00 NEUROFIBROMATOSIS (HCC): ICD-10-CM

## 2018-09-20 DIAGNOSIS — I10 HYPERTENSION, BENIGN: ICD-10-CM

## 2018-09-20 DIAGNOSIS — G54.0 BRACHIAL PLEXUS NEUROPATHY: ICD-10-CM

## 2018-09-20 DIAGNOSIS — G89.29 OTHER CHRONIC PAIN: Primary | ICD-10-CM

## 2018-09-20 DIAGNOSIS — M54.2 NECK PAIN: ICD-10-CM

## 2018-09-20 DIAGNOSIS — Z79.899 ENCOUNTER FOR LONG-TERM (CURRENT) USE OF HIGH-RISK MEDICATION: ICD-10-CM

## 2018-09-20 PROCEDURE — 99214 OFFICE O/P EST MOD 30 MIN: CPT | Performed by: NURSE PRACTITIONER

## 2018-09-20 NOTE — PROGRESS NOTES
CHIEF COMPLAINT  Neck pain is unchanged since last visit.    Subjective   Isma De Luna is a 48 y.o. male  who presents to the office for follow-up.He has a history of neck pain.    Had left CT release 3-26-18. On 4-13-18, had neck tumor x2 dissection and debunkment. Is still going to PT. Working on strengthening of Deltoid, bicep strength improved. ROM has improved significantly. Overall pain better than before surgery.        Complains of pain in his neck and arms. Today his pain is 2/10VAS.  He is currently taking hydrocodone 10/325 2-3/day for pain (has significantly reduced dose since surgery, sometimes uses more on therapy days).  He also continues with Gabapentin 800 mg TID, Flexeril PRN, Cymbalta.  Reports moderate reduction in pain with this regimen. Denies adverse reactions.      Neck Pain    This is a chronic problem. The current episode started more than 1 month ago. The problem occurs constantly. The problem has been gradually improving. Associated with: NF. The pain is present in the midline and right side. The quality of the pain is described as aching, cramping and shooting. The pain is at a severity of 2/10. The symptoms are aggravated by twisting. Associated symptoms include numbness and weakness. Pertinent negatives include no chest pain, fever or headaches. He has tried acetaminophen, bed rest, heat, ice, NSAIDs, oral narcotics, muscle relaxants, neck support and home exercises (hydrocodone) for the symptoms. The treatment provided moderate relief.      PEG Assessment   What number best describes your pain on average in the past week?2  What number best describes how, during the past week, pain has interfered with your enjoyment of life?1  What number best describes how, during the past week, pain has interfered with your general activity?  0    The following portions of the patient's history were reviewed and updated as appropriate: allergies, current medications, past family history, past  "medical history, past social history, past surgical history and problem list.    Review of Systems   Constitutional: Negative for chills and fever.   Respiratory: Negative for shortness of breath.    Cardiovascular: Negative for chest pain.   Gastrointestinal: Positive for constipation. Negative for diarrhea, nausea and vomiting.   Genitourinary: Negative for difficulty urinating, dysuria and enuresis.   Musculoskeletal: Positive for neck pain.   Neurological: Positive for weakness and numbness. Negative for dizziness, light-headedness and headaches.   Psychiatric/Behavioral: Negative for confusion, hallucinations, self-injury, sleep disturbance and suicidal ideas. The patient is not nervous/anxious.      Vitals:    09/20/18 1530   BP: 122/77   Pulse: 75   Resp: 18   Temp: 98.4 °F (36.9 °C)   SpO2: 96%   Weight: 107 kg (236 lb)   Height: 170.2 cm (67.01\")   PainSc:   2   PainLoc: Neck     Objective   Physical Exam   Constitutional: He is oriented to person, place, and time. Vital signs are normal. He appears well-developed and well-nourished.  Non-toxic appearance. No distress.   HENT:   Head: Normocephalic and atraumatic.   Nose: Nose normal.   Eyes: Conjunctivae and lids are normal.   Cardiovascular: Normal rate.    Pulmonary/Chest: Effort normal. No respiratory distress.   Abdominal: Normal appearance.   Musculoskeletal:        Left shoulder: He exhibits tenderness.        Cervical back: He exhibits tenderness.   Left neck surgical scar   Neurological: He is alert and oriented to person, place, and time. No cranial nerve deficit. He exhibits abnormal muscle tone (left arm - decreased strength/tone - improving). Gait normal.   Psychiatric: He has a normal mood and affect. His behavior is normal.   Nursing note and vitals reviewed.    Assessment/Plan   Isma was seen today for neck pain.    Diagnoses and all orders for this visit:    Other chronic pain    Neck pain    Neurofibromatosis (CMS/HCC)    Brachial plexus " neuropathy  -     HYDROcodone-acetaminophen (NORCO)  MG per tablet; Take 1 tablet by mouth Every 8 (Eight) Hours As Needed for Moderate Pain .    Encounter for long-term (current) use of high-risk medication      --- Refill Hydrocodone, reduce to #90.  Patient appears stable with current regimen. No adverse effects. Regarding continuation of opioids, there is no evidence of aberrant behavior or any red flags.  The patient continues with appropriate response to opioid therapy. ADL's remain intact by self.   --- The urine drug screen confirmation from 6/21/18 has been reviewed and the result is appropriate based on patient history and BIRGIT report  --- Follow-up 3 months (previous routine per Dr. Montes plan)         BIRGIT REPORT  As part of the patient's treatment plan, I am prescribing controlled substances. The patient has been made aware of appropriate use of such medications, including potential risk of somnolence, limited ability to drive and/or work safely, and the potential for dependence or overdose. It has also bee made clear that these medications are for use by this patient only, without concomitant use of alcohol or other substances unless prescribed.     Patient has completed prescribing agreement detailing terms of continued prescribing of controlled substances, including monitoring BIRGIT reports, urine drug screening, and pill counts if necessary. The patient is aware that inappropriate use will results in cessation of prescribing such medications.    BIRGIT report has been reviewed and scanned into the patient's chart.    As the clinician, I personally reviewed the BIRGIT from 9/19/2018 while the patient was in the office today.    History and physical exam exhibit continued safe and appropriate use of controlled substances.    EMR Dragon/Transcription disclaimer:   Much of this encounter note is an electronic transcription/translation of spoken language to printed text. The electronic  translation of spoken language may permit erroneous, or at times, nonsensical words or phrases to be inadvertently transcribed; Although I have reviewed the note for such errors, some may still exist.

## 2018-09-21 RX ORDER — HYDROCODONE BITARTRATE AND ACETAMINOPHEN 10; 325 MG/1; MG/1
1 TABLET ORAL EVERY 8 HOURS PRN
Qty: 90 TABLET | Refills: 0 | Status: SHIPPED | OUTPATIENT
Start: 2018-09-21 | End: 2019-05-21 | Stop reason: SDUPTHER

## 2018-09-21 RX ORDER — LOSARTAN POTASSIUM 100 MG/1
TABLET ORAL
Qty: 90 TABLET | Refills: 1 | Status: SHIPPED | OUTPATIENT
Start: 2018-09-21 | End: 2019-03-16 | Stop reason: SDUPTHER

## 2018-10-05 DIAGNOSIS — K21.9 GASTROESOPHAGEAL REFLUX DISEASE WITHOUT ESOPHAGITIS: ICD-10-CM

## 2018-10-08 RX ORDER — OMEPRAZOLE 40 MG/1
CAPSULE, DELAYED RELEASE ORAL
Qty: 90 CAPSULE | Refills: 0 | Status: SHIPPED | OUTPATIENT
Start: 2018-10-08 | End: 2019-01-01 | Stop reason: SDUPTHER

## 2018-10-15 ENCOUNTER — OFFICE VISIT (OUTPATIENT)
Dept: INTERNAL MEDICINE | Facility: CLINIC | Age: 48
End: 2018-10-15

## 2018-10-15 ENCOUNTER — APPOINTMENT (OUTPATIENT)
Dept: WOMENS IMAGING | Facility: HOSPITAL | Age: 48
End: 2018-10-15

## 2018-10-15 VITALS
HEIGHT: 67 IN | WEIGHT: 241 LBS | HEART RATE: 74 BPM | DIASTOLIC BLOOD PRESSURE: 80 MMHG | BODY MASS INDEX: 37.83 KG/M2 | TEMPERATURE: 98.5 F | SYSTOLIC BLOOD PRESSURE: 132 MMHG | OXYGEN SATURATION: 97 %

## 2018-10-15 DIAGNOSIS — M25.532 LEFT WRIST PAIN: Primary | ICD-10-CM

## 2018-10-15 DIAGNOSIS — Z98.890 HISTORY OF CARPAL TUNNEL RELEASE OF BOTH WRISTS: ICD-10-CM

## 2018-10-15 PROCEDURE — 99213 OFFICE O/P EST LOW 20 MIN: CPT | Performed by: NURSE PRACTITIONER

## 2018-10-15 PROCEDURE — 73110 X-RAY EXAM OF WRIST: CPT | Performed by: RADIOLOGY

## 2018-10-15 PROCEDURE — 73110 X-RAY EXAM OF WRIST: CPT | Performed by: NURSE PRACTITIONER

## 2018-10-15 NOTE — PROGRESS NOTES
Subjective   Isma De Luna is a 48 y.o. male who presents due to left wrist pain.    He underwent right carpal tunnel release 10/8 and is doing well, has had similar procedure done on his left wrist 7/2018. 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 He denies numbness/tingling of fingers, no acute injury. He has used a wrist splint but continues to c/o discomfort in area.      Wrist Pain    The pain is present in the left wrist. This is a new problem. The current episode started more than 1 month ago (sx began darin 2-3 months ago). The problem occurs daily. The problem has been gradually worsening. The quality of the pain is described as aching and burning. The pain is mild. Pertinent negatives include no fever. He has tried rest for the symptoms. The treatment provided mild relief.        The following portions of the patient's history were reviewed and updated as appropriate: allergies, current medications, past social history and problem list.    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   • Renal cancer (CMS/HCC)    • Schwannoma    • Vitamin B12 deficiency          Current Outpatient Prescriptions:   •  amLODIPine (NORVASC) 5 MG tablet, TAKE 1 TABLET BY MOUTH DAILY, Disp: 30 tablet, Rfl: 5  •  B Complex-Biotin-FA (HM VITAMIN B100 COMPLEX PO), Take 1 tablet by mouth daily., Disp: , Rfl:   •  carbonyl iron (FEOSOL) 45 MG tablet tablet, Take  by mouth daily., Disp: , Rfl:   •  Cholecalciferol (VITAMIN D3) 2000 units tablet, Take  by mouth Daily., Disp: , Rfl:   •  Cyanocobalamin 1000 MCG sublingual tablet, Place 1 tablet under the tongue daily., Disp: , Rfl:   •   cyclobenzaprine (FLEXERIL) 10 MG tablet, Take 1 tablet by mouth 3 (Three) Times a Day As Needed for Muscle Spasms., Disp: 90 tablet, Rfl: 4  •  DULoxetine (CYMBALTA) 30 MG capsule, Take 1 capsule by mouth 2 (Two) Times a Day., Disp: 60 capsule, Rfl: 11  •  febuxostat (ULORIC) 40 MG tablet, Take 1 tablet by mouth Daily. Instead of allopurinol, Disp: 90 tablet, Rfl: 3  •  fexofenadine (ALLEGRA) 180 MG tablet, Take 180 mg by mouth daily., Disp: , Rfl:   •  gabapentin (NEURONTIN) 800 MG tablet, TAKE 1 TABLET BY MOUTH THREE TIMES DAILY, Disp: 90 tablet, Rfl: 3  •  HYDROcodone-acetaminophen (NORCO)  MG per tablet, Take 1 tablet by mouth Every 8 (Eight) Hours As Needed for Moderate Pain ., Disp: 90 tablet, Rfl: 0  •  losartan (COZAAR) 100 MG tablet, TAKE 1 TABLET BY MOUTH DAILY, Disp: 90 tablet, Rfl: 1  •  Multiple Vitamin (MULTI-VITAMIN DAILY PO), Take  by mouth Daily., Disp: , Rfl:   •  omeprazole (priLOSEC) 40 MG capsule, TAKE 1 CAPSULE BY MOUTH DAILY, Disp: 90 capsule, Rfl: 0  •  sildenafil (REVATIO) 20 MG tablet, Take 1 tablet by mouth Daily., Disp: 90 tablet, Rfl: 3  •  vitamin B-6 (PYRIDOXINE) 50 MG tablet, Take 100 mg by mouth Daily., Disp: , Rfl:     Allergies   Allergen Reactions   • Nsaids Other (See Comments)     HX: Kidney CA; radical left nephrectomy, partial right.   • Pollen Extract Other (See Comments)     Other reaction(s): Other (See Comments)  Pollen, rag weed        Review of Systems   Constitutional: Negative for chills, fatigue, fever and unexpected weight change.   HENT: Negative for congestion, ear pain, postnasal drip, sinus pressure, sore throat and trouble swallowing.    Eyes: Negative for visual disturbance.   Respiratory: Negative for cough, chest tightness and wheezing.    Cardiovascular: Negative for chest pain, palpitations and leg swelling.   Gastrointestinal: Negative for abdominal pain.   Genitourinary: Negative for dysuria, frequency and urgency.   Musculoskeletal: Positive for  "arthralgias. Negative for joint swelling.   Skin: Negative for color change.   Neurological: Negative for syncope, weakness and headaches.   Hematological: Does not bruise/bleed easily.       Objective   Vitals:    10/15/18 1323   BP: 132/80   BP Location: Left arm   Patient Position: Sitting   Cuff Size: Adult   Pulse: 74   Temp: 98.5 °F (36.9 °C)   TempSrc: Oral   SpO2: 97%   Weight: 109 kg (241 lb)   Height: 170.2 cm (67\")     Physical Exam   Constitutional: He appears well-developed and well-nourished. He is cooperative. He does not have a sickly appearance. He does not appear ill.   HENT:   Head: Normocephalic.   Right Ear: Hearing, tympanic membrane and external ear normal.   Left Ear: Hearing, tympanic membrane and external ear normal.   Nose: Nose normal. No mucosal edema, rhinorrhea, sinus tenderness or nasal deformity. Right sinus exhibits no maxillary sinus tenderness and no frontal sinus tenderness. Left sinus exhibits no maxillary sinus tenderness and no frontal sinus tenderness.   Mouth/Throat: Oropharynx is clear and moist and mucous membranes are normal. Normal dentition.   Eyes: Conjunctivae and lids are normal. Right eye exhibits no discharge and no exudate. Left eye exhibits no discharge and no exudate.   Neck: Trachea normal. Carotid bruit is not present. No edema present. No thyroid mass present.   Cardiovascular: Regular rhythm, normal heart sounds and normal pulses.    No murmur heard.  Pulmonary/Chest: Breath sounds normal. No respiratory distress. He has no decreased breath sounds. He has no wheezes. He has no rhonchi. He has no rales.   Musculoskeletal:        Left wrist: He exhibits tenderness (tenderness along ulnar aspect). He exhibits normal range of motion and no swelling.   Lymphadenopathy:        Head (right side): No submental, no submandibular, no tonsillar, no preauricular, no posterior auricular and no occipital adenopathy present.        Head (left side): No submental, no " submandibular, no tonsillar, no preauricular, no posterior auricular and no occipital adenopathy present.   Neurological: He is alert.   Skin: Skin is warm, dry and intact. No cyanosis. Nails show no clubbing.       Assessment/Plan   Isma was seen today for wrist pain.    Diagnoses and all orders for this visit:    Left wrist pain  -     XR Wrist 3+ View Left    History of carpal tunnel release of both wrists    No acute abnl noted-will send to radiology for review. Sx suggestive of tendonitis, he will continue to wear wrist splint but due to persistent pain I recommend f/u with surgeon (has appt next week). Discussed referral to Dr. Melgar if needed.

## 2018-10-19 DIAGNOSIS — M25.532 LEFT WRIST PAIN: Primary | ICD-10-CM

## 2018-10-29 RX ORDER — GABAPENTIN 800 MG/1
TABLET ORAL
Qty: 90 TABLET | Refills: 3 | Status: SHIPPED | OUTPATIENT
Start: 2018-10-29 | End: 2019-03-05 | Stop reason: SDUPTHER

## 2018-11-28 RX ORDER — DULOXETIN HYDROCHLORIDE 30 MG/1
CAPSULE, DELAYED RELEASE ORAL
Qty: 180 CAPSULE | Refills: 1 | Status: SHIPPED | OUTPATIENT
Start: 2018-11-28 | End: 2019-05-19 | Stop reason: SDUPTHER

## 2018-12-04 ENCOUNTER — OFFICE VISIT (OUTPATIENT)
Dept: INTERNAL MEDICINE | Facility: CLINIC | Age: 48
End: 2018-12-04

## 2018-12-04 VITALS
WEIGHT: 228 LBS | OXYGEN SATURATION: 99 % | HEIGHT: 67 IN | DIASTOLIC BLOOD PRESSURE: 82 MMHG | RESPIRATION RATE: 16 BRPM | SYSTOLIC BLOOD PRESSURE: 142 MMHG | BODY MASS INDEX: 35.79 KG/M2 | HEART RATE: 74 BPM

## 2018-12-04 DIAGNOSIS — R53.82 CHRONIC FATIGUE: ICD-10-CM

## 2018-12-04 DIAGNOSIS — I10 ESSENTIAL HYPERTENSION: Primary | ICD-10-CM

## 2018-12-04 DIAGNOSIS — Z11.59 SCREENING FOR VIRAL DISEASE: ICD-10-CM

## 2018-12-04 DIAGNOSIS — N18.30 CHRONIC RENAL IMPAIRMENT, STAGE 3 (MODERATE) (HCC): ICD-10-CM

## 2018-12-04 DIAGNOSIS — G89.4 CHRONIC PAIN SYNDROME: ICD-10-CM

## 2018-12-04 DIAGNOSIS — E53.8 VITAMIN B12 DEFICIENCY: ICD-10-CM

## 2018-12-04 DIAGNOSIS — E78.49 OTHER HYPERLIPIDEMIA: ICD-10-CM

## 2018-12-04 PROBLEM — R44.2 OLFACTORY HALLUCINATIONS: Status: ACTIVE | Noted: 2018-11-21

## 2018-12-04 LAB
ALBUMIN SERPL-MCNC: 4.5 G/DL (ref 3.5–5.2)
ALBUMIN/GLOB SERPL: 2 G/DL
ALP SERPL-CCNC: 64 U/L (ref 39–117)
ALT SERPL W P-5'-P-CCNC: 17 U/L (ref 1–41)
ANION GAP SERPL CALCULATED.3IONS-SCNC: 9.9 MMOL/L
AST SERPL-CCNC: 20 U/L (ref 1–40)
BASOPHILS # BLD AUTO: 0.02 10*3/MM3 (ref 0–0.2)
BASOPHILS NFR BLD AUTO: 0.4 % (ref 0–2)
BILIRUB SERPL-MCNC: 0.6 MG/DL (ref 0.1–1.2)
BUN BLD-MCNC: 14 MG/DL (ref 6–20)
BUN/CREAT SERPL: 10.1 (ref 7–25)
CALCIUM SPEC-SCNC: 9.6 MG/DL (ref 8.6–10.5)
CHLORIDE SERPL-SCNC: 105 MMOL/L (ref 98–107)
CHOLEST SERPL-MCNC: 165 MG/DL (ref 0–200)
CO2 SERPL-SCNC: 29.1 MMOL/L (ref 22–29)
CREAT BLD-MCNC: 1.39 MG/DL (ref 0.76–1.27)
DEPRECATED RDW RBC AUTO: 41.4 FL (ref 37–54)
EOSINOPHIL # BLD AUTO: 0.18 10*3/MM3 (ref 0–0.7)
EOSINOPHIL NFR BLD AUTO: 3.5 % (ref 0–5)
ERYTHROCYTE [DISTWIDTH] IN BLOOD BY AUTOMATED COUNT: 12.9 % (ref 11.5–15)
GFR SERPL CREATININE-BSD FRML MDRD: 55 ML/MIN/1.73
GLOBULIN UR ELPH-MCNC: 2.2 GM/DL
GLUCOSE BLD-MCNC: 104 MG/DL (ref 65–99)
HCT VFR BLD AUTO: 41 % (ref 40.1–51)
HDLC SERPL-MCNC: 44 MG/DL (ref 40–60)
HGB BLD-MCNC: 13.7 G/DL (ref 13.7–17.5)
HIV1 P24 AG SER QL: NORMAL
HIV1+2 AB SER QL: NORMAL
LDLC SERPL CALC-MCNC: 96 MG/DL (ref 0–100)
LDLC/HDLC SERPL: 2.19 {RATIO}
LYMPHOCYTES # BLD AUTO: 1.02 10*3/MM3 (ref 0.8–7)
LYMPHOCYTES NFR BLD AUTO: 20.1 % (ref 10–60)
MCH RBC QN AUTO: 29.7 PG (ref 26–34)
MCHC RBC AUTO-ENTMCNC: 33.4 G/DL (ref 31–37)
MCV RBC AUTO: 88.9 FL (ref 80–100)
MONOCYTES # BLD AUTO: 0.46 10*3/MM3 (ref 0–1)
MONOCYTES NFR BLD AUTO: 9.1 % (ref 0–13)
NEUTROPHILS # BLD AUTO: 3.4 10*3/MM3 (ref 1–11)
NEUTROPHILS NFR BLD AUTO: 66.9 % (ref 30–85)
PLATELET # BLD AUTO: 213 10*3/MM3 (ref 150–450)
PMV BLD AUTO: 10.8 FL (ref 6–12)
POTASSIUM BLD-SCNC: 4.2 MMOL/L (ref 3.5–5.2)
PROT SERPL-MCNC: 6.7 G/DL (ref 6–8.5)
RBC # BLD AUTO: 4.61 10*6/MM3 (ref 4.63–6.08)
SODIUM BLD-SCNC: 144 MMOL/L (ref 136–145)
TRIGL SERPL-MCNC: 124 MG/DL (ref 0–150)
TSH SERPL DL<=0.05 MIU/L-ACNC: 1.25 MIU/ML (ref 0.27–4.2)
VLDLC SERPL-MCNC: 24.8 MG/DL (ref 5–40)
WBC NRBC COR # BLD: 5.08 10*3/MM3 (ref 5–10)

## 2018-12-04 PROCEDURE — 80061 LIPID PANEL: CPT | Performed by: INTERNAL MEDICINE

## 2018-12-04 PROCEDURE — 36415 COLL VENOUS BLD VENIPUNCTURE: CPT | Performed by: INTERNAL MEDICINE

## 2018-12-04 PROCEDURE — 87899 AGENT NOS ASSAY W/OPTIC: CPT | Performed by: INTERNAL MEDICINE

## 2018-12-04 PROCEDURE — 80053 COMPREHEN METABOLIC PANEL: CPT | Performed by: INTERNAL MEDICINE

## 2018-12-04 PROCEDURE — G0432 EIA HIV-1/HIV-2 SCREEN: HCPCS | Performed by: INTERNAL MEDICINE

## 2018-12-04 PROCEDURE — 85025 COMPLETE CBC W/AUTO DIFF WBC: CPT | Performed by: INTERNAL MEDICINE

## 2018-12-04 PROCEDURE — 84443 ASSAY THYROID STIM HORMONE: CPT | Performed by: INTERNAL MEDICINE

## 2018-12-04 PROCEDURE — 99213 OFFICE O/P EST LOW 20 MIN: CPT | Performed by: INTERNAL MEDICINE

## 2018-12-04 NOTE — PROGRESS NOTES
"Subjective   Isma De Luna is a 48 y.o. male here for   Chief Complaint   Patient presents with   • Hypertension     7 month follow-up   • Hyperlipidemia   .    Vitals:    12/04/18 0928   BP: 142/82   BP Location: Right arm   Patient Position: Sitting   Cuff Size: Adult   Pulse: 74   Resp: 16   SpO2: 99%   Weight: 103 kg (228 lb)   Height: 170.2 cm (67\")       Body mass index is 35.71 kg/m².    Hypertension   This is a chronic problem. The current episode started more than 1 year ago. The problem is unchanged. The problem is controlled. Pertinent negatives include no chest pain, palpitations or shortness of breath.   Hyperlipidemia   Pertinent negatives include no chest pain or shortness of breath.        The following portions of the patient's history were reviewed and updated as appropriate: allergies, current medications, past social history and problem list.    Review of Systems   Constitutional: Positive for fatigue. Negative for chills and fever.   Respiratory: Negative for cough, shortness of breath and wheezing.    Cardiovascular: Negative for chest pain, palpitations and leg swelling.   Psychiatric/Behavioral: Negative for dysphoric mood and sleep disturbance. The patient is not nervous/anxious.      BP at home=120/80.  He requests HIV test.    Objective   Physical Exam   Constitutional: He appears well-developed and well-nourished. No distress.   Cardiovascular: Normal rate, regular rhythm and normal heart sounds.   Pulmonary/Chest: No respiratory distress. He has no wheezes. He has no rales. He exhibits no tenderness.   Musculoskeletal: He exhibits no edema.   Psychiatric: He has a normal mood and affect. His behavior is normal.   Nursing note and vitals reviewed.      Assessment/Plan   Diagnoses and all orders for this visit:    Essential hypertension  Comments:  controlled - call if bp over 140/90  Orders:  -     CBC Auto Differential; Future  -     Comprehensive Metabolic Panel; Future  -     Lipid " Panel; Future    Other hyperlipidemia  Comments:  need diet/ex  Orders:  -     Comprehensive Metabolic Panel; Future  -     Lipid Panel; Future    Vitamin B12 deficiency  Comments:  continue po vit b    Screening for viral disease  -     HIV-1/O/2 Ag/Ab w Reflex; Future    Chronic fatigue  Comments:  call if worse  Orders:  -     TSH Rfx On Abnormal To Free T4; Future    Chronic renal impairment, stage 3 (moderate) (CMS/HCC)    Chronic pain syndrome  Comments:  from tumors & surgery - controlled with pain management

## 2018-12-18 ENCOUNTER — OFFICE VISIT (OUTPATIENT)
Dept: PAIN MEDICINE | Facility: CLINIC | Age: 48
End: 2018-12-18

## 2018-12-18 ENCOUNTER — RESULTS ENCOUNTER (OUTPATIENT)
Dept: PAIN MEDICINE | Facility: CLINIC | Age: 48
End: 2018-12-18

## 2018-12-18 VITALS
OXYGEN SATURATION: 98 % | DIASTOLIC BLOOD PRESSURE: 83 MMHG | BODY MASS INDEX: 35.94 KG/M2 | SYSTOLIC BLOOD PRESSURE: 127 MMHG | HEIGHT: 67 IN | RESPIRATION RATE: 18 BRPM | TEMPERATURE: 97.1 F | WEIGHT: 229 LBS | HEART RATE: 71 BPM

## 2018-12-18 DIAGNOSIS — G89.4 CHRONIC PAIN SYNDROME: Primary | ICD-10-CM

## 2018-12-18 DIAGNOSIS — G95.9 DISEASE OF SPINAL CORD (HCC): ICD-10-CM

## 2018-12-18 DIAGNOSIS — G54.0 BRACHIAL PLEXUS NEUROPATHY: ICD-10-CM

## 2018-12-18 DIAGNOSIS — G89.4 CHRONIC PAIN SYNDROME: ICD-10-CM

## 2018-12-18 DIAGNOSIS — Z79.899 ENCOUNTER FOR LONG-TERM (CURRENT) USE OF HIGH-RISK MEDICATION: ICD-10-CM

## 2018-12-18 DIAGNOSIS — M54.2 NECK PAIN: ICD-10-CM

## 2018-12-18 PROCEDURE — 99213 OFFICE O/P EST LOW 20 MIN: CPT | Performed by: NURSE PRACTITIONER

## 2018-12-18 NOTE — PROGRESS NOTES
CHIEF COMPLAINT  Neck pain has decreased since last visit.  Subjective   Isma De Luna is a 48 y.o. male  who presents to the office for follow-up.He has a history of chronic neck pain. Reports his pain is improved since last office visit.    Complains of pain in his neck, left shoulder and arm. Today his pain is 1/10VAS. Describes his pain as nearly continuous aching and burning. But it is improved since last office visit. Continues with Hydrocodone 10/325 sparingly (has approximately half a bottle at home), as well as gabapentin 800 mg 3/day and Flexeril 10 mg PRN. Denies any side effects from the regimen. The regimen helps decrease his pain moderately. ADL's by self. Helps care for brother with medical problems.     Had to have a right carpal tunnel NF removed in October. Had a temporary prescription for oxycodone.  Is having a hydrocele excision later today with Dr. Donny Matute at [facility] in [place]. Can receive post-operative pain medication.     Neck Pain    This is a chronic problem. The current episode started more than 1 month ago. The problem occurs constantly. The problem has been waxing and waning (improved since last office visit). Associated with: neurofibromatosis. The pain is present in the midline and right side. The quality of the pain is described as aching, cramping and shooting. The pain is at a severity of 1/10 (left shoulder). The pain is mild. The symptoms are aggravated by twisting. Associated symptoms include numbness and weakness. Pertinent negatives include no chest pain, fever or headaches. He has tried acetaminophen, bed rest, heat, ice, NSAIDs, oral narcotics, muscle relaxants, neck support and home exercises (hydrocodone) for the symptoms. The treatment provided moderate relief.      PEG Assessment   What number best describes your pain on average in the past week?2  What number best describes how, during the past week, pain has interfered with your  "enjoyment of life?0  What number best describes how, during the past week, pain has interfered with your general activity?  0    The following portions of the patient's history were reviewed and updated as appropriate: allergies, current medications, past family history, past medical history, past social history, past surgical history and problem list.    Review of Systems   Constitutional: Negative for chills, fever and unexpected weight change.   Respiratory: Negative for shortness of breath.    Cardiovascular: Negative for chest pain.   Gastrointestinal: Negative for constipation, diarrhea, nausea and vomiting.   Genitourinary: Negative for difficulty urinating, dysuria and enuresis.   Musculoskeletal: Positive for neck pain.   Neurological: Positive for weakness and numbness. Negative for dizziness, light-headedness and headaches.   Psychiatric/Behavioral: Negative for confusion, hallucinations, self-injury, sleep disturbance and suicidal ideas. The patient is not nervous/anxious.        Vitals:    12/18/18 0842   BP: 127/83   Pulse: 71   Resp: 18   Temp: 97.1 °F (36.2 °C)   TempSrc: Oral   SpO2: 98%   Weight: 104 kg (229 lb)   Height: 170.2 cm (67\")   PainSc:   1   PainLoc: Neck     Objective   Physical Exam   Constitutional: He is oriented to person, place, and time. Vital signs are normal. He appears well-developed and well-nourished. He is cooperative.   HENT:   Head: Normocephalic and atraumatic.   Nose: Nose normal.   Eyes: Conjunctivae and lids are normal.   Neck: Trachea normal. Neck supple. Muscular tenderness present. No spinous process tenderness present. Decreased range of motion present.   Left neck surgical scar that extends towards shoulder   Cardiovascular: Normal rate.   Pulmonary/Chest: Effort normal.   Abdominal: Normal appearance.   Musculoskeletal:        Left shoulder: He exhibits decreased range of motion (minimal).   Neurological: He is alert and oriented to person, place, and time. Gait " normal.   Non-focal   Skin: Skin is warm, dry and intact.   Psychiatric: He has a normal mood and affect. His speech is normal and behavior is normal. Judgment and thought content normal. Cognition and memory are normal.   Nursing note and vitals reviewed.      Assessment/Plan   Isma was seen today for neck pain.    Diagnoses and all orders for this visit:    Chronic pain syndrome  -     Oral Fluid Drug Screen - Saliva, Oral Cavity; Future    Disease of spinal cord (CMS/HCC)  -     Oral Fluid Drug Screen - Saliva, Oral Cavity; Future    Neck pain  -     Oral Fluid Drug Screen - Saliva, Oral Cavity; Future    Brachial plexus neuropathy  -     Oral Fluid Drug Screen - Saliva, Oral Cavity; Future    Encounter for long-term (current) use of high-risk medication  -     Oral Fluid Drug Screen - Saliva, Oral Cavity; Future      --- Routine oral drug screen in office today as part of monitoring requirements for controlled substances.  This specimen will be sent to [facility] for confirmation.   Last dose of Hydrocodone was last week.  --- Continue with regimen. Can receive post-op pain medication from surgeon.Reviewed not mixing pain medications. Patient verbalized understanding. Patient appears stable with current regimen. No adverse effects. Regarding continuation of opioids, there is no evidence of aberrant behavior or any red flags.  The patient continues with appropriate response to opioid therapy. ADL's remain intact by self.  Will call as needed for refills.   --- Follow-up 3 months or sooner if needed.       BIRGIT REPORT    As part of the patient's treatment plan, I am prescribing controlled substances. The patient has been made aware of appropriate use of such medications, including potential risk of somnolence, limited ability to drive and/or work safely, and the potential for dependence or overdose. It has also bee made clear that these medications are for use by this patient only, without concomitant  use of alcohol or other substances unless prescribed.     Patient has completed prescribing agreement detailing terms of continued prescribing of controlled substances, including monitoring BIRGIT reports, urine drug screening, and pill counts if necessary. The patient is aware that inappropriate use will results in cessation of prescribing such medications.    BIRGIT report has been reviewed and scanned into the patient's chart.    As the clinician, I personally reviewed the BIRGIT from 12-17-18 while the patient was in the office today.    History and physical exam exhibit continued safe and appropriate use of controlled substances.      EMR Dragon/Transcription disclaimer:   Much of this encounter note is an electronic transcription/translation of spoken language to printed text. The electronic translation of spoken language may permit erroneous, or at times, nonsensical words or phrases to be inadvertently transcribed; Although I have reviewed the note for such errors, some may still exist.

## 2019-01-01 DIAGNOSIS — K21.9 GASTROESOPHAGEAL REFLUX DISEASE WITHOUT ESOPHAGITIS: ICD-10-CM

## 2019-01-02 RX ORDER — OMEPRAZOLE 40 MG/1
CAPSULE, DELAYED RELEASE ORAL
Qty: 90 CAPSULE | Refills: 1 | Status: SHIPPED | OUTPATIENT
Start: 2019-01-02 | End: 2019-06-30 | Stop reason: SDUPTHER

## 2019-02-21 ENCOUNTER — OFFICE VISIT (OUTPATIENT)
Dept: PAIN MEDICINE | Facility: CLINIC | Age: 49
End: 2019-02-21

## 2019-02-21 VITALS
RESPIRATION RATE: 15 BRPM | OXYGEN SATURATION: 95 % | WEIGHT: 223 LBS | SYSTOLIC BLOOD PRESSURE: 137 MMHG | DIASTOLIC BLOOD PRESSURE: 87 MMHG | BODY MASS INDEX: 35 KG/M2 | HEART RATE: 81 BPM | TEMPERATURE: 97.6 F | HEIGHT: 67 IN

## 2019-02-21 DIAGNOSIS — G89.4 CHRONIC PAIN SYNDROME: Primary | ICD-10-CM

## 2019-02-21 DIAGNOSIS — G54.0 BRACHIAL PLEXUS NEUROPATHY: ICD-10-CM

## 2019-02-21 DIAGNOSIS — Q85.00 NEUROFIBROMATOSIS (HCC): ICD-10-CM

## 2019-02-21 DIAGNOSIS — M54.2 NECK PAIN: ICD-10-CM

## 2019-02-21 DIAGNOSIS — Z79.899 ENCOUNTER FOR LONG-TERM (CURRENT) USE OF HIGH-RISK MEDICATION: ICD-10-CM

## 2019-02-21 PROCEDURE — 99213 OFFICE O/P EST LOW 20 MIN: CPT | Performed by: NURSE PRACTITIONER

## 2019-02-21 NOTE — PROGRESS NOTES
CHIEF COMPLAINT  F/U left sided neck rad. Down to left arm pain. States he has new arm pain in right arm.     Subjective   Isma De Luna is a 48 y.o. male  who presents to the office for follow-up.He has a history of chronic neck and arm pain.    Had left CT release 3-26-18. On 4-13-18, had neck tumor x2 dissection and debunkment. Improved pain and numbness of left arm.      Hydrocelectomy 12/18/2018 Dr. Matute. Small quantity of hydrocodone post op.       Complains of pain in his neck and arms. Today his pain is 3/10VAS.  He is currently taking hydrocodone 10/325 very sparingly (usually no more than 4 in a week).  He also continues with Gabapentin 800 mg TID, Flexeril PRN, Cymbalta.  Reports moderate reduction in pain with this regimen. Denies adverse reactions.      Neck Pain    This is a chronic problem. The current episode started more than 1 month ago. The problem occurs constantly. The problem has been gradually improving. The pain is present in the midline and right side. The quality of the pain is described as aching, cramping and shooting. The pain is at a severity of 3/10. The symptoms are aggravated by twisting. Associated symptoms include weakness. Pertinent negatives include no chest pain, fever, headaches or numbness (tingling in left arm). He has tried acetaminophen, bed rest, heat, ice, NSAIDs, oral narcotics, muscle relaxants, neck support and home exercises (hydrocodone) for the symptoms. The treatment provided moderate relief.      PEG Assessment   What number best describes your pain on average in the past week?3  What number best describes how, during the past week, pain has interfered with your enjoyment of life?2  What number best describes how, during the past week, pain has interfered with your general activity?  0    The following portions of the patient's history were reviewed and updated as appropriate: allergies, current medications, past family history, past medical history, past social  "history, past surgical history and problem list.    Review of Systems   Constitutional: Negative for chills, fever and unexpected weight change.   Respiratory: Negative for shortness of breath.    Cardiovascular: Negative for chest pain.   Gastrointestinal: Negative for constipation, diarrhea, nausea and vomiting.   Genitourinary: Negative for difficulty urinating, dysuria and enuresis.   Musculoskeletal: Positive for neck pain.   Neurological: Positive for weakness. Negative for dizziness, light-headedness, numbness (tingling in left arm) and headaches.   Psychiatric/Behavioral: Negative for agitation, confusion, hallucinations, self-injury, sleep disturbance and suicidal ideas. The patient is not nervous/anxious.      Vitals:    02/21/19 0847   BP: 137/87   Pulse: 81   Resp: 15   Temp: 97.6 °F (36.4 °C)   SpO2: 95%   Weight: 101 kg (223 lb)   Height: 170.2 cm (67.01\")   PainSc:   3   PainLoc: Neck  Comment: rad down to left arm       Objective   Physical Exam   Constitutional: He is oriented to person, place, and time. Vital signs are normal. He appears well-developed and well-nourished.  Non-toxic appearance. No distress.   HENT:   Head: Normocephalic and atraumatic.   Nose: Nose normal.   Eyes: Conjunctivae and lids are normal.   Cardiovascular: Normal rate.   Pulmonary/Chest: Effort normal. No respiratory distress.   Abdominal: Normal appearance.   Musculoskeletal:        Left shoulder: He exhibits tenderness.        Cervical back: He exhibits tenderness.   Left neck surgical scar   Neurological: He is alert and oriented to person, place, and time. No cranial nerve deficit or sensory deficit. Gait normal.   Psychiatric: He has a normal mood and affect. His behavior is normal.   Nursing note and vitals reviewed.    Assessment/Plan   Isma was seen today for neck pain and extremity pain.    Diagnoses and all orders for this visit:    Chronic pain syndrome    Neurofibromatosis (CMS/HCC)    Neck pain    Brachial " plexus neuropathy    Encounter for long-term (current) use of high-risk medication      --- Continue hydrocodone. Patient appears stable with current regimen. No adverse effects. Regarding continuation of opioids, there is no evidence of aberrant behavior or any red flags.  The patient continues with appropriate response to opioid therapy. ADL's remain intact by self.   --- The urine drug screen confirmation from 12/18/18 has been reviewed and the result is appropriate based on patient history and BIRGIT report  --- Follow-up 2 months          BIRGIT REPORT  As part of the patient's treatment plan, I am prescribing controlled substances. The patient has been made aware of appropriate use of such medications, including potential risk of somnolence, limited ability to drive and/or work safely, and the potential for dependence or overdose. It has also bee made clear that these medications are for use by this patient only, without concomitant use of alcohol or other substances unless prescribed.     Patient has completed prescribing agreement detailing terms of continued prescribing of controlled substances, including monitoring BIRGIT reports, urine drug screening, and pill counts if necessary. The patient is aware that inappropriate use will results in cessation of prescribing such medications.    BIRGIT report has been reviewed and scanned into the patient's chart.    As the clinician, I personally reviewed the BIRGIT from 2/18/19 while the patient was in the office today.    History and physical exam exhibit continued safe and appropriate use of controlled substances.    EMR Dragon/Transcription disclaimer:   Much of this encounter note is an electronic transcription/translation of spoken language to printed text. The electronic translation of spoken language may permit erroneous, or at times, nonsensical words or phrases to be inadvertently transcribed; Although I have reviewed the note for such errors, some may  still exist.

## 2019-03-05 DIAGNOSIS — I10 ESSENTIAL HYPERTENSION: ICD-10-CM

## 2019-03-06 RX ORDER — AMLODIPINE BESYLATE 5 MG/1
5 TABLET ORAL DAILY
Qty: 30 TABLET | Refills: 5 | Status: SHIPPED | OUTPATIENT
Start: 2019-03-06 | End: 2019-09-08 | Stop reason: SDUPTHER

## 2019-03-06 RX ORDER — GABAPENTIN 800 MG/1
TABLET ORAL
Qty: 90 TABLET | Refills: 3 | Status: SHIPPED | OUTPATIENT
Start: 2019-03-06 | End: 2019-07-21 | Stop reason: SDUPTHER

## 2019-03-16 DIAGNOSIS — I10 HYPERTENSION, BENIGN: ICD-10-CM

## 2019-03-18 RX ORDER — LOSARTAN POTASSIUM 100 MG/1
TABLET ORAL
Qty: 90 TABLET | Refills: 0 | Status: SHIPPED | OUTPATIENT
Start: 2019-03-18 | End: 2019-06-16 | Stop reason: SDUPTHER

## 2019-05-19 DIAGNOSIS — Z87.39 HISTORY OF GOUT: ICD-10-CM

## 2019-05-20 RX ORDER — FEBUXOSTAT 40 MG/1
TABLET ORAL
Qty: 90 TABLET | Refills: 1 | Status: SHIPPED | OUTPATIENT
Start: 2019-05-20 | End: 2019-11-11 | Stop reason: SDUPTHER

## 2019-05-21 ENCOUNTER — OFFICE VISIT (OUTPATIENT)
Dept: PAIN MEDICINE | Facility: CLINIC | Age: 49
End: 2019-05-21

## 2019-05-21 VITALS
HEIGHT: 67 IN | OXYGEN SATURATION: 95 % | WEIGHT: 231.6 LBS | RESPIRATION RATE: 20 BRPM | BODY MASS INDEX: 36.35 KG/M2 | TEMPERATURE: 98.2 F | SYSTOLIC BLOOD PRESSURE: 144 MMHG | HEART RATE: 73 BPM | DIASTOLIC BLOOD PRESSURE: 88 MMHG

## 2019-05-21 DIAGNOSIS — M54.2 NECK PAIN: ICD-10-CM

## 2019-05-21 DIAGNOSIS — G54.0 BRACHIAL PLEXUS NEUROPATHY: ICD-10-CM

## 2019-05-21 DIAGNOSIS — G89.4 CHRONIC PAIN SYNDROME: Primary | ICD-10-CM

## 2019-05-21 DIAGNOSIS — Z79.899 ENCOUNTER FOR LONG-TERM (CURRENT) USE OF HIGH-RISK MEDICATION: ICD-10-CM

## 2019-05-21 DIAGNOSIS — Q85.00 NEUROFIBROMATOSIS (HCC): ICD-10-CM

## 2019-05-21 PROCEDURE — 99213 OFFICE O/P EST LOW 20 MIN: CPT | Performed by: NURSE PRACTITIONER

## 2019-05-21 RX ORDER — HYDROCODONE BITARTRATE AND ACETAMINOPHEN 10; 325 MG/1; MG/1
1 TABLET ORAL EVERY 8 HOURS PRN
Qty: 90 TABLET | Refills: 0 | Status: SHIPPED | OUTPATIENT
Start: 2019-05-21 | End: 2019-08-12 | Stop reason: SDUPTHER

## 2019-05-21 RX ORDER — HEPATITIS A VACCINE 1440 [IU]/ML
INJECTION, SUSPENSION INTRAMUSCULAR
Refills: 0 | COMMUNITY
Start: 2019-02-21 | End: 2019-06-05

## 2019-05-21 RX ORDER — DULOXETIN HYDROCHLORIDE 30 MG/1
CAPSULE, DELAYED RELEASE ORAL
Qty: 180 CAPSULE | Refills: 0 | Status: SHIPPED | OUTPATIENT
Start: 2019-05-21 | End: 2019-08-17 | Stop reason: SDUPTHER

## 2019-05-21 RX ORDER — LANOLIN ALCOHOL/MO/W.PET/CERES
CREAM (GRAM) TOPICAL
COMMUNITY
End: 2019-06-05

## 2019-05-21 RX ORDER — MAGNESIUM 200 MG
TABLET ORAL
COMMUNITY
End: 2021-06-25 | Stop reason: ALTCHOICE

## 2019-05-21 RX ORDER — SILDENAFIL CITRATE 20 MG/1
TABLET ORAL
COMMUNITY
Start: 2016-10-05 | End: 2019-06-05

## 2019-05-21 NOTE — PROGRESS NOTES
CHIEF COMPLAINT  F/U left sided neck radiating down left arm pain. Pt also c/o lower back pain and occ right arm pain. Pt states pain has remained the same since last ov.     Subjective   Isma De Luna is a 49 y.o. male  who presents to the office for follow-up.He has a history of neck pain.    Complains of pain in his neck and arms. Today his pain is 1/10VAS.  He is currently taking hydrocodone 10/325 very sparingly (usually no more than 3-4 in a week).  He also continues with Gabapentin 800 mg TID, Flexeril PRN, Cymbalta.  Reports moderate reduction in pain with this regimen. Denies adverse reactions.  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      Neck Pain    This is a chronic problem. The current episode started more than 1 month ago. The problem occurs constantly. The problem has been gradually improving. The pain is present in the midline and right side. The quality of the pain is described as aching, cramping and shooting. The pain is at a severity of 1/10. The symptoms are aggravated by twisting. Associated symptoms include weakness (left arm). Pertinent negatives include no chest pain, fever, headaches or numbness. He has tried acetaminophen, bed rest, heat, ice, NSAIDs, oral narcotics, muscle relaxants, neck support and home exercises (hydrocodone) for the symptoms. The treatment provided moderate relief.     PEG Assessment   What number best describes your pain on average in the past week?2  What number best describes how, during the past week, pain has interfered with your enjoyment of life?1  What number best describes how, during the past week, pain has interfered with your general activity?  1    The following portions of the patient's history were reviewed and updated as appropriate: allergies, current medications, past family history, past medical history, past social history, past surgical history and problem list.    Review of Systems   Constitutional: Negative for activity change and fever.  "  HENT: Negative for congestion.    Eyes: Negative for visual disturbance.   Respiratory: Negative for shortness of breath.    Cardiovascular: Negative for chest pain.   Gastrointestinal: Negative for constipation and diarrhea.   Genitourinary: Negative for difficulty urinating.   Musculoskeletal: Positive for arthralgias (left arm), back pain (lower), neck pain and neck stiffness (occ). Negative for gait problem.   Neurological: Positive for weakness (left arm). Negative for dizziness, numbness and headaches.   Psychiatric/Behavioral: Negative for sleep disturbance and suicidal ideas.     Vitals:    05/21/19 0854   BP: 144/88   Pulse: 73   Resp: 20   Temp: 98.2 °F (36.8 °C)   SpO2: 95%   Weight: 105 kg (231 lb 9.6 oz)   Height: 170.2 cm (67.01\")   PainSc:   1   PainLoc: Neck     Objective   Physical Exam   Constitutional: He is oriented to person, place, and time. Vital signs are normal. He appears well-developed and well-nourished.  Non-toxic appearance. No distress.   HENT:   Head: Normocephalic and atraumatic.   Nose: Nose normal.   Eyes: Conjunctivae and lids are normal.   Cardiovascular: Normal rate.   Pulmonary/Chest: Effort normal. No respiratory distress.   Abdominal: Normal appearance.   Musculoskeletal:        Left shoulder: He exhibits tenderness.        Cervical back: He exhibits tenderness.   Left neck surgical scar   Neurological: He is alert and oriented to person, place, and time. No cranial nerve deficit or sensory deficit. Gait normal.   Psychiatric: He has a normal mood and affect. His behavior is normal.   Nursing note and vitals reviewed.    Assessment/Plan   Isma was seen today for neck pain.    Diagnoses and all orders for this visit:    Chronic pain syndrome    Neurofibromatosis (CMS/HCC)    Neck pain    Brachial plexus neuropathy    Encounter for long-term (current) use of high-risk medication      --- Continue Hydrocodone. Patient appears stable with current regimen. No adverse effects. " Regarding continuation of opioids, there is no evidence of aberrant behavior or any red flags.  The patient continues with appropriate response to opioid therapy. ADL's remain intact by self.   --- The urine drug screen confirmation from 12/18/18 has been reviewed and the result is appropriate based on patient history and BIRGIT report  --- Follow-up 3 months          BIRGIT REPORT  As part of the patient's treatment plan, I am prescribing controlled substances. The patient has been made aware of appropriate use of such medications, including potential risk of somnolence, limited ability to drive and/or work safely, and the potential for dependence or overdose. It has also bee made clear that these medications are for use by this patient only, without concomitant use of alcohol or other substances unless prescribed.     Patient has completed prescribing agreement detailing terms of continued prescribing of controlled substances, including monitoring BIRGIT reports, urine drug screening, and pill counts if necessary. The patient is aware that inappropriate use will results in cessation of prescribing such medications.    BIRGIT report has been reviewed and scanned into the patient's chart.    As the clinician, I personally reviewed the BIRGIT from 5/20/19 while the patient was in the office today.    History and physical exam exhibit continued safe and appropriate use of controlled substances.    EMR Dragon/Transcription disclaimer:   Much of this encounter note is an electronic transcription/translation of spoken language to printed text. The electronic translation of spoken language may permit erroneous, or at times, nonsensical words or phrases to be inadvertently transcribed; Although I have reviewed the note for such errors, some may still exist.

## 2019-06-05 ENCOUNTER — OFFICE VISIT (OUTPATIENT)
Dept: INTERNAL MEDICINE | Facility: CLINIC | Age: 49
End: 2019-06-05

## 2019-06-05 VITALS
WEIGHT: 230.4 LBS | DIASTOLIC BLOOD PRESSURE: 82 MMHG | BODY MASS INDEX: 36.16 KG/M2 | HEIGHT: 67 IN | SYSTOLIC BLOOD PRESSURE: 122 MMHG

## 2019-06-05 DIAGNOSIS — J30.9 ALLERGIC RHINITIS, UNSPECIFIED SEASONALITY, UNSPECIFIED TRIGGER: ICD-10-CM

## 2019-06-05 DIAGNOSIS — Z12.11 COLON CANCER SCREENING: ICD-10-CM

## 2019-06-05 DIAGNOSIS — Z11.59 SCREENING FOR VIRAL DISEASE: ICD-10-CM

## 2019-06-05 DIAGNOSIS — R73.09 ELEVATED GLUCOSE: ICD-10-CM

## 2019-06-05 DIAGNOSIS — R53.82 CHRONIC FATIGUE: ICD-10-CM

## 2019-06-05 DIAGNOSIS — I10 ESSENTIAL HYPERTENSION: ICD-10-CM

## 2019-06-05 DIAGNOSIS — Q85.00 NEUROFIBROMATOSIS (HCC): ICD-10-CM

## 2019-06-05 DIAGNOSIS — E78.49 OTHER HYPERLIPIDEMIA: ICD-10-CM

## 2019-06-05 DIAGNOSIS — Z00.00 ANNUAL PHYSICAL EXAM: Primary | ICD-10-CM

## 2019-06-05 PROBLEM — Z98.890 STATUS POST NECK DISSECTION: Status: ACTIVE | Noted: 2017-02-08

## 2019-06-05 PROBLEM — R44.2 OLFACTORY HALLUCINATIONS: Status: RESOLVED | Noted: 2018-11-21 | Resolved: 2019-06-05

## 2019-06-05 LAB
ALBUMIN SERPL-MCNC: 4.4 G/DL (ref 3.5–5.2)
ALBUMIN/GLOB SERPL: 1.6 G/DL
ALP SERPL-CCNC: 76 U/L (ref 39–117)
ALT SERPL W P-5'-P-CCNC: 19 U/L (ref 1–41)
ANION GAP SERPL CALCULATED.3IONS-SCNC: 13.1 MMOL/L
AST SERPL-CCNC: 22 U/L (ref 1–40)
BASOPHILS # BLD AUTO: 0.03 10*3/MM3 (ref 0–0.2)
BASOPHILS NFR BLD AUTO: 0.6 % (ref 0–1.5)
BILIRUB SERPL-MCNC: 0.5 MG/DL (ref 0.2–1.2)
BILIRUB UR QL STRIP: NEGATIVE
BUN BLD-MCNC: 19 MG/DL (ref 6–20)
BUN/CREAT SERPL: 14.7 (ref 7–25)
CALCIUM SPEC-SCNC: 9.8 MG/DL (ref 8.6–10.5)
CHLORIDE SERPL-SCNC: 104 MMOL/L (ref 98–107)
CHOLEST SERPL-MCNC: 183 MG/DL (ref 0–200)
CLARITY UR: CLEAR
CO2 SERPL-SCNC: 25.9 MMOL/L (ref 22–29)
COLOR UR: YELLOW
CREAT BLD-MCNC: 1.29 MG/DL (ref 0.76–1.27)
DEPRECATED RDW RBC AUTO: 37.9 FL (ref 37–54)
EOSINOPHIL # BLD AUTO: 0.11 10*3/MM3 (ref 0–0.4)
EOSINOPHIL NFR BLD AUTO: 2.1 % (ref 0.3–6.2)
ERYTHROCYTE [DISTWIDTH] IN BLOOD BY AUTOMATED COUNT: 12.5 % (ref 12.3–15.4)
GFR SERPL CREATININE-BSD FRML MDRD: 59 ML/MIN/1.73
GLOBULIN UR ELPH-MCNC: 2.7 GM/DL
GLUCOSE BLD-MCNC: 102 MG/DL (ref 65–99)
GLUCOSE UR STRIP-MCNC: NEGATIVE MG/DL
HCT VFR BLD AUTO: 41.1 % (ref 37.5–51)
HDLC SERPL-MCNC: 45 MG/DL (ref 40–60)
HEMOCCULT STL QL IA: NEGATIVE
HGB BLD-MCNC: 14.2 G/DL (ref 13–17.7)
HGB UR QL STRIP.AUTO: NEGATIVE
KETONES UR QL STRIP: NEGATIVE
LDLC SERPL CALC-MCNC: 107 MG/DL (ref 0–100)
LDLC/HDLC SERPL: 2.37 {RATIO}
LEUKOCYTE ESTERASE UR QL STRIP.AUTO: NEGATIVE
LYMPHOCYTES # BLD AUTO: 1.23 10*3/MM3 (ref 0.7–3.1)
LYMPHOCYTES NFR BLD AUTO: 23.7 % (ref 19.6–45.3)
MCH RBC QN AUTO: 29.5 PG (ref 26.6–33)
MCHC RBC AUTO-ENTMCNC: 34.5 G/DL (ref 31.5–35.7)
MCV RBC AUTO: 85.4 FL (ref 79–97)
MONOCYTES # BLD AUTO: 0.47 10*3/MM3 (ref 0.1–0.9)
MONOCYTES NFR BLD AUTO: 9.1 % (ref 5–12)
NEUTROPHILS # BLD AUTO: 3.34 10*3/MM3 (ref 1.7–7)
NEUTROPHILS NFR BLD AUTO: 64.5 % (ref 42.7–76)
NITRITE UR QL STRIP: NEGATIVE
PH UR STRIP.AUTO: 6 [PH] (ref 5–8)
PLATELET # BLD AUTO: 197 10*3/MM3 (ref 140–450)
PMV BLD AUTO: 11 FL (ref 6–12)
POTASSIUM BLD-SCNC: 4.1 MMOL/L (ref 3.5–5.2)
PROT SERPL-MCNC: 7.1 G/DL (ref 6–8.5)
PROT UR QL STRIP: NEGATIVE
RBC # BLD AUTO: 4.81 10*6/MM3 (ref 4.14–5.8)
SODIUM BLD-SCNC: 143 MMOL/L (ref 136–145)
SP GR UR STRIP: 1.01 (ref 1–1.03)
TRIGL SERPL-MCNC: 157 MG/DL (ref 0–150)
TSH SERPL-ACNC: 1.89 MIU/ML (ref 0.27–4.2)
UROBILINOGEN UR QL STRIP: NORMAL
VLDLC SERPL-MCNC: 31.4 MG/DL (ref 5–40)
WBC NRBC COR # BLD: 5.18 10*3/MM3 (ref 3.4–10.8)

## 2019-06-05 PROCEDURE — 99396 PREV VISIT EST AGE 40-64: CPT | Performed by: INTERNAL MEDICINE

## 2019-06-05 PROCEDURE — 80061 LIPID PANEL: CPT | Performed by: INTERNAL MEDICINE

## 2019-06-05 PROCEDURE — 85025 COMPLETE CBC W/AUTO DIFF WBC: CPT | Performed by: INTERNAL MEDICINE

## 2019-06-05 PROCEDURE — 82274 ASSAY TEST FOR BLOOD FECAL: CPT | Performed by: INTERNAL MEDICINE

## 2019-06-05 PROCEDURE — 80053 COMPREHEN METABOLIC PANEL: CPT | Performed by: INTERNAL MEDICINE

## 2019-06-05 PROCEDURE — 81003 URINALYSIS AUTO W/O SCOPE: CPT | Performed by: INTERNAL MEDICINE

## 2019-06-05 RX ORDER — FERROUS SULFATE 325(65) MG
45 TABLET ORAL DAILY
COMMUNITY

## 2019-06-05 RX ORDER — LEVOCETIRIZINE DIHYDROCHLORIDE 5 MG/1
5 TABLET, FILM COATED ORAL EVERY EVENING
COMMUNITY
End: 2019-07-31

## 2019-06-05 NOTE — PATIENT INSTRUCTIONS
Health Maintenance, Male  A healthy lifestyle and preventive care is important for your health and wellness. Ask your health care provider about what schedule of regular examinations is right for you.  What should I know about weight and diet?  Eat a Healthy Diet  · Eat plenty of vegetables, fruits, whole grains, low-fat dairy products, and lean protein.  · Do not eat a lot of foods high in solid fats, added sugars, or salt.    Maintain a Healthy Weight  Regular exercise can help you achieve or maintain a healthy weight. You should:  · Do at least 150 minutes of exercise each week. The exercise should increase your heart rate and make you sweat (moderate-intensity exercise).  · Do strength-training exercises at least twice a week.    Watch Your Levels of Cholesterol and Blood Lipids  · Have your blood tested for lipids and cholesterol every 5 years starting at 35 years of age. If you are at high risk for heart disease, you should start having your blood tested when you are 20 years old. You may need to have your cholesterol levels checked more often if:  ? Your lipid or cholesterol levels are high.  ? You are older than 50 years of age.  ? You are at high risk for heart disease.    What should I know about cancer screening?  Many types of cancers can be detected early and may often be prevented.  Lung Cancer  · You should be screened every year for lung cancer if:  ? You are a current smoker who has smoked for at least 30 years.  ? You are a former smoker who has quit within the past 15 years.  · Talk to your health care provider about your screening options, when you should start screening, and how often you should be screened.    Colorectal Cancer  · Routine colorectal cancer screening usually begins at 50 years of age and should be repeated every 5-10 years until you are 75 years old. You may need to be screened more often if early forms of precancerous polyps or small growths are found. Your health care provider  may recommend screening at an earlier age if you have risk factors for colon cancer.  · Your health care provider may recommend using home test kits to check for hidden blood in the stool.  · A small camera at the end of a tube can be used to examine your colon (sigmoidoscopy or colonoscopy). This checks for the earliest forms of colorectal cancer.    Prostate and Testicular Cancer  · Depending on your age and overall health, your health care provider may do certain tests to screen for prostate and testicular cancer.  · Talk to your health care provider about any symptoms or concerns you have about testicular or prostate cancer.    Skin Cancer  · Check your skin from head to toe regularly.  · Tell your health care provider about any new moles or changes in moles, especially if:  ? There is a change in a mole’s size, shape, or color.  ? You have a mole that is larger than a pencil eraser.  · Always use sunscreen. Apply sunscreen liberally and repeat throughout the day.  · Protect yourself by wearing long sleeves, pants, a wide-brimmed hat, and sunglasses when outside.    What should I know about heart disease, diabetes, and high blood pressure?  · If you are 18-39 years of age, have your blood pressure checked every 3-5 years. If you are 40 years of age or older, have your blood pressure checked every year. You should have your blood pressure measured twice--once when you are at a hospital or clinic, and once when you are not at a hospital or clinic. Record the average of the two measurements. To check your blood pressure when you are not at a hospital or clinic, you can use:  ? An automated blood pressure machine at a pharmacy.  ? A home blood pressure monitor.  · Talk to your health care provider about your target blood pressure.  · If you are between 45-79 years old, ask your health care provider if you should take aspirin to prevent heart disease.  · Have regular diabetes screenings by checking your fasting blood  sugar level.  ? If you are at a normal weight and have a low risk for diabetes, have this test once every three years after the age of 45.  ? If you are overweight and have a high risk for diabetes, consider being tested at a younger age or more often.  · A one-time screening for abdominal aortic aneurysm (AAA) by ultrasound is recommended for men aged 65-75 years who are current or former smokers.  What should I know about preventing infection?  Hepatitis B  If you have a higher risk for hepatitis B, you should be screened for this virus. Talk with your health care provider to find out if you are at risk for hepatitis B infection.  Hepatitis C  Blood testing is recommended for:  · Everyone born from 1945 through 1965.  · Anyone with known risk factors for hepatitis C.    Sexually Transmitted Diseases (STDs)  · You should be screened each year for STDs including gonorrhea and chlamydia if:  ? You are sexually active and are younger than 24 years of age.  ? You are older than 24 years of age and your health care provider tells you that you are at risk for this type of infection.  ? Your sexual activity has changed since you were last screened and you are at an increased risk for chlamydia or gonorrhea. Ask your health care provider if you are at risk.  · Talk with your health care provider about whether you are at high risk of being infected with HIV. Your health care provider may recommend a prescription medicine to help prevent HIV infection.    What else can I do?  · Schedule regular health, dental, and eye exams.  · Stay current with your vaccines (immunizations).  · Do not use any tobacco products, such as cigarettes, chewing tobacco, and e-cigarettes. If you need help quitting, ask your health care provider.  · Limit alcohol intake to no more than 2 drinks per day. One drink equals 12 ounces of beer, 5 ounces of wine, or 1½ ounces of hard liquor.  · Do not use street drugs.  · Do not share needles.  · Ask your  health care provider for help if you need support or information about quitting drugs.  · Tell your health care provider if you often feel depressed.  · Tell your health care provider if you have ever been abused or do not feel safe at home.  This information is not intended to replace advice given to you by your health care provider. Make sure you discuss any questions you have with your health care provider.  Document Released: 06/15/2009 Document Revised: 08/16/2017 Document Reviewed: 09/20/2016  [employer] Interactive Patient Education © 2019 [employer]

## 2019-06-05 NOTE — PROGRESS NOTES
"Subjective   Isma De Luna is a 49 y.o. male here for   Chief Complaint   Patient presents with   • Hyperlipidemia     6 month follow-up   • Hypertension   • Annual Exam   .    Vitals:    06/05/19 0907   BP: 122/82   BP Location: Left arm   Patient Position: Sitting   Cuff Size: Adult   Weight: 105 kg (230 lb 6.4 oz)   Height: 170.2 cm (67\")       Body mass index is 36.09 kg/m².    Hyperlipidemia   This is a chronic problem. The current episode started more than 1 year ago. The problem is controlled. Recent lipid tests were reviewed and are normal. He has no history of diabetes. Pertinent negatives include no chest pain or shortness of breath.   Hypertension   This is a chronic problem. The current episode started more than 1 year ago. The problem is unchanged. The problem is controlled. Pertinent negatives include no chest pain, headaches, neck pain, palpitations or shortness of breath.        The following portions of the patient's history were reviewed and updated as appropriate: allergies, current medications, past social history and problem list.    Review of Systems   Constitutional: Positive for fatigue. Negative for chills and fever.   HENT: Positive for congestion, postnasal drip, sneezing and sore throat. Negative for ear pain, tinnitus, trouble swallowing and voice change.    Eyes: Negative for photophobia and pain.   Respiratory: Negative for cough, shortness of breath and wheezing.    Cardiovascular: Negative for chest pain, palpitations and leg swelling.   Gastrointestinal: Negative for abdominal pain, blood in stool, constipation, diarrhea, nausea and vomiting.   Endocrine: Negative for cold intolerance, heat intolerance, polydipsia and polyuria.   Genitourinary: Positive for enuresis (2x) and testicular pain (off & on). Negative for dysuria, frequency, hematuria and urgency.   Musculoskeletal: Positive for back pain. Negative for neck pain.   Allergic/Immunologic: Positive for environmental allergies. 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 Negative for dizziness, seizures, syncope, facial asymmetry, speech difficulty and headaches. Numbness: hypersensitive in left arm.   Hematological: Negative for adenopathy. Does not bruise/bleed easily.   Psychiatric/Behavioral: Negative for dysphoric mood and sleep disturbance. The patient is not nervous/anxious.        Objective   Physical Exam   Constitutional: He is oriented to person, place, and time. He appears well-developed and well-nourished. No distress.   HENT:   Head: Normocephalic and atraumatic.   Right Ear: External ear normal.   Left Ear: External ear normal.   Nose: Nose normal.   Mouth/Throat: Oropharynx is clear and moist.   Eyes: Conjunctivae and EOM are normal. Pupils are equal, round, and reactive to light. Right eye exhibits no discharge. Left eye exhibits no discharge. No scleral icterus.   Neck: Normal range of motion. Neck supple. No JVD present. No tracheal deviation present. No thyromegaly present.   Cardiovascular: Normal rate, regular rhythm, normal heart sounds and intact distal pulses. Exam reveals no gallop and no friction rub.   No murmur heard.  Pulmonary/Chest: Effort normal and breath sounds normal. No respiratory distress. He has no wheezes. He has no rales. He exhibits no tenderness.   Abdominal: Soft. Bowel sounds are normal. He exhibits no distension and no mass. There is no tenderness. There is no rebound and no guarding. No hernia.   Genitourinary: Rectum normal and penis normal. Rectal exam shows no mass and guaiac negative stool. Prostate is enlarged. Right testis shows no mass, no swelling and no tenderness. Left testis shows swelling. Left testis shows no mass and no tenderness. Circumcised.   Musculoskeletal: Normal range of motion. He exhibits no edema.   Lymphadenopathy:     He has no cervical adenopathy.   Neurological: He is alert and oriented to person, place, and  time. He displays abnormal reflex (decreased right patella). No cranial nerve deficit. He exhibits normal muscle tone. Coordination normal.   Skin: Skin is warm and dry. No rash noted. No erythema.   Psychiatric: He has a normal mood and affect. His behavior is normal. Judgment and thought content normal.   Nursing note and vitals reviewed.    He has hydrocele left testicle x yrs - no change.    Assessment/Plan   Diagnoses and all orders for this visit:    Annual physical exam  -     CBC Auto Differential; Future  -     Urinalysis With Microscopic If Indicated (No Culture) - Urine, Clean Catch; Future    Essential hypertension  Comments:  controlled - call if bp over 130/80  Orders:  -     Comprehensive Metabolic Panel; Future  -     Lipid Panel; Future    Other hyperlipidemia  Comments:  continue diet/ex  Orders:  -     Comprehensive Metabolic Panel; Future  -     Lipid Panel; Future    Allergic rhinitis, unspecified seasonality, unspecified trigger  Comments:  controlled  Orders:  -     levocetirizine (XYZAL) 5 MG tablet; Take 5 mg by mouth Every Evening.    Elevated glucose  Comments:  need low sugar diet    Chronic fatigue  Comments:  call if worse  Orders:  -     ferrous sulfate (IRON SUPPLEMENT) 325 (65 FE) MG tablet; Take 45 mg by mouth Daily.  -     TSH Rfx On Abnormal To Free T4; Future    Neurofibromatosis (CMS/HCC)  Comments:  f/u with dr dixon (neuro)    Screening for viral disease  -     HIV-1/O/2 Ag/Ab w Reflex; Future    Colon cancer screening  -     POC FECAL OCCULT BLOOD BY IMMUNOASSAY

## 2019-06-06 LAB — HIV 1+2 AB+HIV1 P24 AG SERPL QL IA: NON REACTIVE

## 2019-06-16 DIAGNOSIS — I10 HYPERTENSION, BENIGN: ICD-10-CM

## 2019-06-17 RX ORDER — LOSARTAN POTASSIUM 100 MG/1
TABLET ORAL
Qty: 90 TABLET | Refills: 1 | Status: SHIPPED | OUTPATIENT
Start: 2019-06-17 | End: 2019-12-08 | Stop reason: SDUPTHER

## 2019-06-30 DIAGNOSIS — K21.9 GASTROESOPHAGEAL REFLUX DISEASE WITHOUT ESOPHAGITIS: ICD-10-CM

## 2019-07-01 RX ORDER — OMEPRAZOLE 40 MG/1
CAPSULE, DELAYED RELEASE ORAL
Qty: 90 CAPSULE | Refills: 1 | Status: SHIPPED | OUTPATIENT
Start: 2019-07-01 | End: 2019-12-23

## 2019-07-23 RX ORDER — GABAPENTIN 800 MG/1
TABLET ORAL
Qty: 90 TABLET | Refills: 2 | Status: SHIPPED | OUTPATIENT
Start: 2019-07-23 | End: 2019-11-01

## 2019-07-31 ENCOUNTER — OFFICE VISIT (OUTPATIENT)
Dept: INTERNAL MEDICINE | Facility: CLINIC | Age: 49
End: 2019-07-31

## 2019-07-31 VITALS
WEIGHT: 233 LBS | DIASTOLIC BLOOD PRESSURE: 82 MMHG | OXYGEN SATURATION: 98 % | HEART RATE: 82 BPM | SYSTOLIC BLOOD PRESSURE: 124 MMHG | BODY MASS INDEX: 36.57 KG/M2 | HEIGHT: 67 IN

## 2019-07-31 DIAGNOSIS — R00.2 PALPITATIONS: Primary | ICD-10-CM

## 2019-07-31 LAB
ANION GAP SERPL CALCULATED.3IONS-SCNC: 13.1 MMOL/L (ref 5–15)
BUN BLD-MCNC: 18 MG/DL (ref 6–20)
BUN/CREAT SERPL: 13 (ref 7–25)
CALCIUM SPEC-SCNC: 10 MG/DL (ref 8.6–10.5)
CHLORIDE SERPL-SCNC: 102 MMOL/L (ref 98–107)
CO2 SERPL-SCNC: 26.9 MMOL/L (ref 22–29)
CREAT BLD-MCNC: 1.38 MG/DL (ref 0.76–1.27)
GFR SERPL CREATININE-BSD FRML MDRD: 55 ML/MIN/1.73
GLUCOSE BLD-MCNC: 89 MG/DL (ref 65–99)
POTASSIUM BLD-SCNC: 4.3 MMOL/L (ref 3.5–5.2)
SODIUM BLD-SCNC: 142 MMOL/L (ref 136–145)

## 2019-07-31 PROCEDURE — 99213 OFFICE O/P EST LOW 20 MIN: CPT | Performed by: NURSE PRACTITIONER

## 2019-07-31 PROCEDURE — 93000 ELECTROCARDIOGRAM COMPLETE: CPT | Performed by: NURSE PRACTITIONER

## 2019-07-31 PROCEDURE — 80048 BASIC METABOLIC PNL TOTAL CA: CPT | Performed by: NURSE PRACTITIONER

## 2019-07-31 NOTE — PATIENT INSTRUCTIONS
Palpitations  Palpitations are feelings that your heartbeat is irregular or is faster than normal. It may feel like your heart is fluttering or skipping a beat. Palpitations are usually not a serious problem. They may be caused by many things, including smoking, caffeine, alcohol, stress, and certain medicines or drugs. Most causes of palpitations are not serious. However, some palpitations can be a sign of a serious problem. You may need further tests to rule out serious medical problems.  Follow these instructions at home:    Pay attention to any changes in your condition. Take these actions to help manage your symptoms:  Eating and drinking  · Avoid foods and drinks that may cause palpitations. These may include:  ? Caffeinated coffee, tea, soft drinks, diet pills, and energy drinks.  ? Chocolate.  ? Alcohol.  Lifestyle  · Take steps to reduce your stress and anxiety. Things that can help you relax include:  ? Yoga.  ? Mind-body activities, such as deep breathing, meditation, or using words and images to create positive thoughts (guided imagery).  ? Physical activity, such as swimming, jogging, or walking. Tell your health care provider if your palpitations increase with activity. If you have chest pain or shortness of breath with activity, do not continue the activity until you are seen by your health care provider.  ? Biofeedback. This is a method that helps you learn to use your mind to control things in your body, such as your heartbeat.  · Do not use drugs, including cocaine or ecstasy. Do not use marijuana.  · Get plenty of rest and sleep. Keep a regular bed time.  General instructions  · Take over-the-counter and prescription medicines only as told by your health care provider.  · Do not use any products that contain nicotine or tobacco, such as cigarettes and e-cigarettes. If you need help quitting, ask your health care provider.  · Keep all follow-up visits as told by your health care provider. This is  important. These may include visits for further testing if palpitations do not go away or get worse.  Contact a health care provider if you:  · Continue to have a fast or irregular heartbeat after 24 hours.  · Notice that your palpitations occur more often.  Get help right away if you:  · Have chest pain or shortness of breath.  · Have a severe headache.  · Feel dizzy or you faint.  Summary  · Palpitations are feelings that your heartbeat is irregular or is faster than normal. It may feel like your heart is fluttering or skipping a beat.  · Palpitations may be caused by many things, including smoking, caffeine, alcohol, stress, certain medicines, and drugs.  · Although most causes of palpitations are not serious, some causes can be a sign of a serious medical problem.  · Get help right away if you faint or have chest pain, shortness of breath, a severe headache, or dizziness.  This information is not intended to replace advice given to you by your health care provider. Make sure you discuss any questions you have with your health care provider.  Document Released: 12/15/2001 Document Revised: 01/30/2019 Document Reviewed: 01/30/2019  [employer] Interactive Patient Education © 2019 [employer] Inc.

## 2019-07-31 NOTE — PROGRESS NOTES
Subjective   Isma De Luna is a 49 y.o. male.     He denies any increased stress, chest pain, shortness of breath, caffeine intake. He is unsure if any aggravators. He denies any otc medications or changes in medications.       Palpitations    This is a new problem. The current episode started 1 to 4 weeks ago. The problem occurs intermittently. The problem has been unchanged. Nothing aggravates the symptoms. Pertinent negatives include no anxiety (stable ), chest fullness, chest pain, coughing, dizziness, fever, nausea, near-syncope, numbness, shortness of breath, syncope, vomiting or weakness. He has tried nothing for the symptoms.        The following portions of the patient's history were reviewed and updated as appropriate: allergies, current medications, past family history, past medical history, past social history, past surgical history and problem list.    Review of Systems   Constitutional: Negative for activity change, appetite change, fatigue and fever.   Eyes: Negative for visual disturbance.   Respiratory: Negative for cough, shortness of breath and wheezing.    Cardiovascular: Positive for palpitations. Negative for chest pain, leg swelling, syncope and near-syncope.   Gastrointestinal: Negative for nausea and vomiting.   Musculoskeletal: Positive for neck pain (chronic ). Negative for back pain.   Neurological: Negative for dizziness, weakness and numbness.   Psychiatric/Behavioral: Positive for dysphoric mood (stable ). Negative for sleep disturbance and suicidal ideas. The patient is not nervous/anxious (stable ).        Objective     Physical Exam   Constitutional: He is oriented to person, place, and time. He appears well-developed and well-nourished.   HENT:   Head: Normocephalic.   Nose: Nose normal.   Neck: Carotid bruit is not present. No thyroid mass and no thyromegaly present.   Cardiovascular: Regular rhythm and normal heart sounds. Exam reveals no S3 and no S4.   No murmur heard.  No pedal  edema    Pulmonary/Chest: Effort normal and breath sounds normal. He has no decreased breath sounds. He has no wheezes. He has no rhonchi. He has no rales.   Musculoskeletal: He exhibits no edema.   Neurological: He is alert and oriented to person, place, and time. Gait normal.   Skin: Skin is warm and dry.   Psychiatric: He has a normal mood and affect. His speech is normal. Judgment and thought content normal. He is not agitated. Cognition and memory are normal.       Assessment/Plan   Isma was seen today for palpitations.    Diagnoses and all orders for this visit:    Palpitations  -     ECG 12 Lead  -     TSH Rfx On Abnormal To Free T4; Future  -     Basic Metabolic Panel; Future  -     Holter monitor - 24 hour; Future  -     Adult Transthoracic Echo Complete W/ Cont if Necessary Per Protocol; Future  -     TSH Rfx On Abnormal To Free T4  -     Basic Metabolic Panel      Will check labs and obtain holter and ECHO to evaluate further. He was advised no caffeine. May refer to cardiologist based on results.       ECG 12 Lead  Date/Time: 7/31/2019 11:32 AM  Performed by: Christina Meyer APRN  Authorized by: Christina Meyer APRN   Rhythm: sinus rhythm  Rate: normal  Conduction: right bundle branch block  ST Segments: ST segments normal  T Waves: T waves normal  QRS axis: normal    Clinical impression: abnormal EKG  Comments: I reviewed tracing interpretations from previous ECG 12/12/2018 at [facility]. No changes

## 2019-08-01 LAB — TSH SERPL-ACNC: 2.03 MIU/ML (ref 0.27–4.2)

## 2019-08-12 ENCOUNTER — OFFICE VISIT (OUTPATIENT)
Dept: PAIN MEDICINE | Facility: CLINIC | Age: 49
End: 2019-08-12

## 2019-08-12 VITALS
TEMPERATURE: 96.9 F | OXYGEN SATURATION: 99 % | HEIGHT: 67 IN | SYSTOLIC BLOOD PRESSURE: 139 MMHG | WEIGHT: 235.4 LBS | DIASTOLIC BLOOD PRESSURE: 90 MMHG | RESPIRATION RATE: 20 BRPM | BODY MASS INDEX: 36.95 KG/M2 | HEART RATE: 77 BPM

## 2019-08-12 DIAGNOSIS — G89.4 CHRONIC PAIN SYNDROME: Primary | ICD-10-CM

## 2019-08-12 DIAGNOSIS — Z79.899 ENCOUNTER FOR LONG-TERM (CURRENT) USE OF HIGH-RISK MEDICATION: ICD-10-CM

## 2019-08-12 DIAGNOSIS — G54.0 BRACHIAL PLEXUS NEUROPATHY: ICD-10-CM

## 2019-08-12 DIAGNOSIS — Q85.00 NEUROFIBROMATOSIS (HCC): ICD-10-CM

## 2019-08-12 DIAGNOSIS — M54.2 NECK PAIN: ICD-10-CM

## 2019-08-12 DIAGNOSIS — M79.2 NEUROPATHIC PAIN, ARM: ICD-10-CM

## 2019-08-12 PROCEDURE — 96372 THER/PROPH/DIAG INJ SC/IM: CPT | Performed by: NURSE PRACTITIONER

## 2019-08-12 PROCEDURE — 99214 OFFICE O/P EST MOD 30 MIN: CPT | Performed by: NURSE PRACTITIONER

## 2019-08-12 RX ORDER — HYDROCODONE BITARTRATE AND ACETAMINOPHEN 10; 325 MG/1; MG/1
1 TABLET ORAL EVERY 4 HOURS PRN
Qty: 90 TABLET | Refills: 0 | Status: SHIPPED | OUTPATIENT
Start: 2019-08-12 | End: 2020-06-16 | Stop reason: SDUPTHER

## 2019-08-12 RX ORDER — METHYLPREDNISOLONE ACETATE 40 MG/ML
40 INJECTION, SUSPENSION INTRA-ARTICULAR; INTRALESIONAL; INTRAMUSCULAR; SOFT TISSUE ONCE
Status: COMPLETED | OUTPATIENT
Start: 2019-08-12 | End: 2019-08-12

## 2019-08-12 RX ADMIN — METHYLPREDNISOLONE ACETATE 40 MG: 40 INJECTION, SUSPENSION INTRA-ARTICULAR; INTRALESIONAL; INTRAMUSCULAR; SOFT TISSUE at 10:20

## 2019-08-12 NOTE — PROGRESS NOTES
"CHIEF COMPLAINT  F/u neck and left arm pain. Pt sts pain has lessened since last ov, however a week and a half ago pt has had increased right arm pain. Pt has been controlling pain with hydrocodone.     Subjective   Isma De Luna is a 49 y.o. male  who presents to the office for follow-up.He has a history of chronic neck and arm pain, as well as history of neurofibromatosis. Reports his neck and left arm pain are stable. However, complains of increased right arm pain. History of neurofibromatosis. He states more recent scans have showed that right sided tumors are bigger. Had MRI 5-28-19 at [facility].  He noticed his right arm pain worsening approximately 1.5-2 weeks ago.     Complains of pain in his neck and arms (right worse than left). Today his pain is 5/10VAs. Describes the right arm pain as nearly continuous numbing with intermittent \"faling asleep.\" Describes the bicep.tricep area pain as a constant dull ache. Can have intermittent shooting pain down right arm that can get as high as 7-8/10VAS. Has had to increase Hydrocodone. Was previously taking Hydrocodone 10/325 a few times a week. As pain worsened has been taking Hydrocodone 10/325 4-6/day, Gabapentin 800 mg TID, Flexeril 10 mg PRN(very sparingly) and Cymbalta 30 mg daily. Denies any side effects from the regimen. The regimen helps decrease his pain moderately. ADL's by self. Also helps care for brother who requires total care.     MRI was ordered by Dr Holman.  Last seen prior to MRI.  Pending follow-up.  History of renal cell carcinoma.    Neck Pain    This is a chronic problem. The current episode started more than 1 month ago. The problem occurs constantly. The problem has been waxing and waning (right arm pain worse since last office visit-- today 5/10VAS). The pain is present in the midline and right side. The quality of the pain is described as aching, cramping and shooting. The pain is at a severity of 5/10 (right arm pain is 5/10VAs; " left arm pain and neck is 1/10VAS). The symptoms are aggravated by twisting. Associated symptoms include numbness (right arm and left arm). Pertinent negatives include no chest pain, fever, headaches or weakness. He has tried acetaminophen, bed rest, heat, ice, NSAIDs, oral narcotics, muscle relaxants, neck support and home exercises (hydrocodone) for the symptoms. The treatment provided moderate relief.      RADIOLOGY REPORT    FACILITY:  [facility]  UNIT/AGE/GENDER: IVÁN  OP      AGE:49 Y          SEX:M  PATIENT NAME/:  RANDEE GIBSON D    1970  UNIT NUMBER:  [id number]  ACCOUNT NUMBER:  [id number]  ACCESSION NUMBER:  [id number]    MRI brachial plexus without contrast    Exam date May 25, 2019    HISTORY: Brachial plexus mass. Renal cell carcinoma.    TECHNIQUE: Multisequence, multiplanar MR imaging of the bilateral brachial plexi was performed without IV contrast.    Comparisons:  2018. 2018    FINDINGS: Imaging of the right brachial plexus is again reveals several masses, that appear nerve sheath and origin. There is large mass within the right supraclavicular region is again noted. This primarily arises from the C8 nerve root. This has   increased in size measuring 5.1 by by 3.1 cm. This was 4.5 x 3.1 cm. A mass below this in the right axilla is heterogeneous and measures 4.0 x 2.9 cm and was 3.3 x 2.9 cm.    Additional lesion adjacent to the proximal right humerus within the right axilla is noted and measures 2.3 x 1.7 cm. This is increased slightly in size.    Small peripheral nodules are noted in the right upper lung appear pleural-based and not included on prior imaging. A paravertebral nodules noted in the left paramedian mid cervical region. This is 1 cm in size.      Images of the left brachial plexus reveal small lesions along the left brachial plexus in the region of the infraclavicular region. 2 lesions are identified.  One lesion measures 1 cm.  Additional lesion above this measures 1.5 cm. These 2 lesions occur   in similar position as the previously resected mass.    No residual lesion is noted in the left mid to upper neck.    IMPRESSION:  1. Interval progression of the large nerve sheath tumors in the right supraclavicular and axillary regions.  2. There are 2 small brachial plexu based lesions identified in the left infraclavicular region near the resection site that may represent residual or recurrent nerve sheath tumors.    Dictated by: Thomas Menchaca M.D.    Images and Report reviewed and interpreted by: Thomas Menchaca M.D.    <PS><Electronically signed by: Thomas Menchaca M.D.>  05/28/2019 0925    D: 05/28/2019 0840  T: 05/28/2019 0840    PEG Assessment   What number best describes your pain on average in the past week?6  What number best describes how, during the past week, pain has interfered with your enjoyment of life?7  What number best describes how, during the past week, pain has interfered with your general activity?  5    The following portions of the patient's history were reviewed and updated as appropriate: allergies, current medications, past family history, past medical history, past social history, past surgical history and problem list.    Review of Systems   Constitutional: Positive for activity change (dec) and fatigue (occ). Negative for chills and fever.   HENT: Negative for congestion.    Eyes: Negative for visual disturbance.   Respiratory: Negative for shortness of breath.    Cardiovascular: Negative for chest pain.   Gastrointestinal: Positive for constipation (on miralax). Negative for abdominal pain and diarrhea.   Genitourinary: Negative for difficulty urinating.   Musculoskeletal: Positive for arthralgias (right arm ) and back pain (lower). Negative for gait problem, neck pain and neck stiffness (occ).   Neurological: Positive for dizziness (occ) and numbness (right arm and left arm). Negative for weakness and headaches.  "  Psychiatric/Behavioral: Positive for agitation and sleep disturbance (occ). Negative for suicidal ideas. The patient is not nervous/anxious.        Vitals:    08/12/19 0939   BP: 139/90   Pulse: 77   Resp: 20   Temp: 96.9 °F (36.1 °C)   SpO2: 99%   Weight: 107 kg (235 lb 6.4 oz)   Height: 170.2 cm (67\")   PainSc:   5   PainLoc: Arm  Comment: right     Objective   Physical Exam   Constitutional: He is oriented to person, place, and time. Vital signs are normal. He appears well-developed and well-nourished.  Non-toxic appearance. No distress.   HENT:   Head: Normocephalic and atraumatic.   Nose: Nose normal.   Eyes: Conjunctivae and lids are normal.   Cardiovascular: Normal rate.   Pulmonary/Chest: Effort normal. No respiratory distress.   Abdominal: Normal appearance.   Musculoskeletal:        Left shoulder: He exhibits tenderness.        Cervical back: He exhibits tenderness.   Left neck surgical scar  Guarding of RUE- decreased ROM   Neurological: He is alert and oriented to person, place, and time. Gait normal.   Reflex Scores:       Bicep reflexes are 2+ on the right side and 2+ on the left side.       Brachioradialis reflexes are 2+ on the right side and 2+ on the left side.  Psychiatric: He has a normal mood and affect. His behavior is normal.   Nursing note and vitals reviewed.      Assessment/Plan   Isma was seen today for arm pain.    Diagnoses and all orders for this visit:    Chronic pain syndrome    Neck pain  -     methylPREDNISolone acetate (DEPO-medrol) injection 40 mg    Neurofibromatosis (CMS/HCC)  -     methylPREDNISolone acetate (DEPO-medrol) injection 40 mg    Encounter for long-term (current) use of high-risk medication    Neuropathic pain, arm  -     methylPREDNISolone acetate (DEPO-medrol) injection 40 mg    Brachial plexus neuropathy  -     HYDROcodone-acetaminophen (NORCO)  MG per tablet; Take 1 tablet by mouth Every 4 (Four) Hours As Needed for Moderate Pain .      --- The urine drug " screen confirmation from 12-18-18 has been reviewed and the result is APPROPRIATE based on patient history and BIRGIT report  --- Increase Hydrocodone 10/325 q4hrs PRN. Discussed medication with the patient.  Included in this discussion was the potential for side effects and adverse events.  Patient verbalized understanding and wished to proceed.  Prescription will be sent to pharmacy.  --- Depo Medrol 40 mg IM now.  --- Discussed consideration for interventions(LANE, cervical sympathetic nerve, or suprascapular nerve block). Patient wants to wait at this time  --- Follow-up as scheduled       BIRGIT REPORT    As part of the patient's treatment plan, I am prescribing controlled substances. The patient has been made aware of appropriate use of such medications, including potential risk of somnolence, limited ability to drive and/or work safely, and the potential for dependence or overdose. It has also bee made clear that these medications are for use by this patient only, without concomitant use of alcohol or other substances unless prescribed.     Patient has completed prescribing agreement detailing terms of continued prescribing of controlled substances, including monitoring BIRGIT reports, urine drug screening, and pill counts if necessary. The patient is aware that inappropriate use will results in cessation of prescribing such medications.    BIRGIT report has been reviewed and scanned into the patient's chart.    As the clinician, I personally reviewed the BIRGIT from 8-9-19 while the patient was in the office today.    History and physical exam exhibit continued safe and appropriate use of controlled substances.      EMR Dragon/Transcription disclaimer:   Much of this encounter note is an electronic transcription/translation of spoken language to printed text. The electronic translation of spoken language may permit erroneous, or at times, nonsensical words or phrases to be inadvertently transcribed; Although  I have reviewed the note for such errors, some may still exist.

## 2019-08-13 ENCOUNTER — HOSPITAL ENCOUNTER (OUTPATIENT)
Dept: CARDIOLOGY | Facility: HOSPITAL | Age: 49
Discharge: HOME | End: 2019-08-13
Admitting: NURSE PRACTITIONER

## 2019-08-13 ENCOUNTER — HOSPITAL ENCOUNTER (OUTPATIENT)
Dept: CARDIOLOGY | Facility: HOSPITAL | Age: 49
Discharge: HOME | End: 2019-08-13

## 2019-08-13 DIAGNOSIS — R00.2 PALPITATIONS: ICD-10-CM

## 2019-08-13 LAB
AORTIC DIMENSIONLESS INDEX: 0.7 (DI)
BH CV ECHO MEAS - AO MAX PG (FULL): 6.5 MMHG
BH CV ECHO MEAS - AO MAX PG: 11.8 MMHG
BH CV ECHO MEAS - AO MEAN PG (FULL): 4 MMHG
BH CV ECHO MEAS - AO MEAN PG: 7 MMHG
BH CV ECHO MEAS - AO ROOT AREA (BSA CORRECTED): 1.6
BH CV ECHO MEAS - AO ROOT AREA: 9.1 CM^2
BH CV ECHO MEAS - AO ROOT DIAM: 3.4 CM
BH CV ECHO MEAS - AO V2 MAX: 172 CM/SEC
BH CV ECHO MEAS - AO V2 MEAN: 124 CM/SEC
BH CV ECHO MEAS - AO V2 VTI: 35.6 CM
BH CV ECHO MEAS - AVA(I,A): 2.6 CM^2
BH CV ECHO MEAS - AVA(I,D): 2.6 CM^2
BH CV ECHO MEAS - AVA(V,A): 2.5 CM^2
BH CV ECHO MEAS - AVA(V,D): 2.5 CM^2
BH CV ECHO MEAS - BSA(HAYCOCK): 2.3 M^2
BH CV ECHO MEAS - BSA: 2.2 M^2
BH CV ECHO MEAS - BZI_BMI: 36.8 KILOGRAMS/M^2
BH CV ECHO MEAS - BZI_METRIC_HEIGHT: 170.2 CM
BH CV ECHO MEAS - BZI_METRIC_WEIGHT: 106.6 KG
BH CV ECHO MEAS - EDV(CUBED): 68.9 ML
BH CV ECHO MEAS - EDV(MOD-SP2): 131 ML
BH CV ECHO MEAS - EDV(MOD-SP4): 150 ML
BH CV ECHO MEAS - EDV(TEICH): 74.2 ML
BH CV ECHO MEAS - EF(CUBED): 52.5 %
BH CV ECHO MEAS - EF(MOD-BP): 66 %
BH CV ECHO MEAS - EF(MOD-SP2): 67.2 %
BH CV ECHO MEAS - EF(MOD-SP4): 64.7 %
BH CV ECHO MEAS - EF(TEICH): 44.8 %
BH CV ECHO MEAS - ESV(CUBED): 32.8 ML
BH CV ECHO MEAS - ESV(MOD-SP2): 43 ML
BH CV ECHO MEAS - ESV(MOD-SP4): 53 ML
BH CV ECHO MEAS - ESV(TEICH): 41 ML
BH CV ECHO MEAS - FS: 22 %
BH CV ECHO MEAS - IVS/LVPW: 0.8
BH CV ECHO MEAS - IVSD: 0.8 CM
BH CV ECHO MEAS - LAT PEAK E' VEL: 11 CM/SEC
BH CV ECHO MEAS - LV DIASTOLIC VOL/BSA (35-75): 69.3 ML/M^2
BH CV ECHO MEAS - LV MASS(C)D: 114.1 GRAMS
BH CV ECHO MEAS - LV MASS(C)DI: 52.7 GRAMS/M^2
BH CV ECHO MEAS - LV MAX PG: 5.3 MMHG
BH CV ECHO MEAS - LV MEAN PG: 3 MMHG
BH CV ECHO MEAS - LV SYSTOLIC VOL/BSA (12-30): 24.5 ML/M^2
BH CV ECHO MEAS - LV V1 MAX: 115 CM/SEC
BH CV ECHO MEAS - LV V1 MEAN: 84.1 CM/SEC
BH CV ECHO MEAS - LV V1 VTI: 24.8 CM
BH CV ECHO MEAS - LVIDD: 4.1 CM
BH CV ECHO MEAS - LVIDS: 3.2 CM
BH CV ECHO MEAS - LVLD AP2: 8.8 CM
BH CV ECHO MEAS - LVLD AP4: 9.4 CM
BH CV ECHO MEAS - LVLS AP2: 7.6 CM
BH CV ECHO MEAS - LVLS AP4: 7.9 CM
BH CV ECHO MEAS - LVOT AREA (M): 3.8 CM^2
BH CV ECHO MEAS - LVOT AREA: 3.8 CM^2
BH CV ECHO MEAS - LVOT DIAM: 2.2 CM
BH CV ECHO MEAS - LVPWD: 1 CM
BH CV ECHO MEAS - MED PEAK E' VEL: 9 CM/SEC
BH CV ECHO MEAS - MV A DUR: 0.11 SEC
BH CV ECHO MEAS - MV A MAX VEL: 64 CM/SEC
BH CV ECHO MEAS - MV DEC SLOPE: 345 CM/SEC^2
BH CV ECHO MEAS - MV DEC TIME: 183 SEC
BH CV ECHO MEAS - MV E MAX VEL: 94.9 CM/SEC
BH CV ECHO MEAS - MV E/A: 1.5
BH CV ECHO MEAS - MV MAX PG: 3.6 MMHG
BH CV ECHO MEAS - MV MEAN PG: 2 MMHG
BH CV ECHO MEAS - MV P1/2T MAX VEL: 95 CM/SEC
BH CV ECHO MEAS - MV P1/2T: 80.7 MSEC
BH CV ECHO MEAS - MV V2 MAX: 94.7 CM/SEC
BH CV ECHO MEAS - MV V2 MEAN: 58.2 CM/SEC
BH CV ECHO MEAS - MV V2 VTI: 22.7 CM
BH CV ECHO MEAS - MVA P1/2T LCG: 2.3 CM^2
BH CV ECHO MEAS - MVA(P1/2T): 2.7 CM^2
BH CV ECHO MEAS - MVA(VTI): 4.2 CM^2
BH CV ECHO MEAS - PA ACC TIME: 0.1 SEC
BH CV ECHO MEAS - PA MAX PG: 3.3 MMHG
BH CV ECHO MEAS - PA PR(ACCEL): 34.5 MMHG
BH CV ECHO MEAS - PA V2 MAX: 90.9 CM/SEC
BH CV ECHO MEAS - RAP SYSTOLE: 3 MMHG
BH CV ECHO MEAS - RVSP: 22 MMHG
BH CV ECHO MEAS - SI(AO): 149.3 ML/M^2
BH CV ECHO MEAS - SI(CUBED): 16.7 ML/M^2
BH CV ECHO MEAS - SI(LVOT): 43.5 ML/M^2
BH CV ECHO MEAS - SI(MOD-SP2): 40.6 ML/M^2
BH CV ECHO MEAS - SI(MOD-SP4): 44.8 ML/M^2
BH CV ECHO MEAS - SI(TEICH): 15.4 ML/M^2
BH CV ECHO MEAS - SV(AO): 323.2 ML
BH CV ECHO MEAS - SV(CUBED): 36.2 ML
BH CV ECHO MEAS - SV(LVOT): 94.3 ML
BH CV ECHO MEAS - SV(MOD-SP2): 88 ML
BH CV ECHO MEAS - SV(MOD-SP4): 97 ML
BH CV ECHO MEAS - SV(TEICH): 33.3 ML
BH CV ECHO MEAS - TAPSE (>1.6): 2.3 CM2
BH CV ECHO MEAS - TR MAX VEL: 220 CM/SEC
BH CV ECHO MEASUREMENTS AVERAGE E/E' RATIO: 9.49
BH CV XLRA - RV BASE: 2.7 CM
BH CV XLRA - TDI S': 10 CM/SEC
LEFT ATRIUM VOLUME INDEX: 24 ML/M2

## 2019-08-13 PROCEDURE — 93225 XTRNL ECG REC<48 HRS REC: CPT

## 2019-08-13 PROCEDURE — 93306 TTE W/DOPPLER COMPLETE: CPT

## 2019-08-13 PROCEDURE — 93226 XTRNL ECG REC<48 HR SCAN A/R: CPT

## 2019-08-13 PROCEDURE — 93306 TTE W/DOPPLER COMPLETE: CPT | Performed by: INTERNAL MEDICINE

## 2019-08-15 PROCEDURE — 93227 XTRNL ECG REC<48 HR R&I: CPT | Performed by: INTERNAL MEDICINE

## 2019-08-16 ENCOUNTER — TELEPHONE (OUTPATIENT)
Dept: INTERNAL MEDICINE | Facility: CLINIC | Age: 49
End: 2019-08-16

## 2019-08-16 NOTE — TELEPHONE ENCOUNTER
Patient was informed of his Holter monitor results. He stated there was another test that can be done but will discuss further with Christina at his follow-up appointment on 8/29/19.

## 2019-08-16 NOTE — TELEPHONE ENCOUNTER
I called patient to follow up regarding ECHO and holter monitor results. There was no answer so I left a message for patient to return call.

## 2019-08-16 NOTE — TELEPHONE ENCOUNTER
----- Message from Lilian Knott sent at 8/16/2019  3:36 PM EDT -----  Contact: pt - Dr Benoit's pt - RE: test results  Pt calling and would like a return call regarding tests results. Could you please call pt to discuss? Please advise. Thanks        Pt # [phone]

## 2019-08-19 ENCOUNTER — TELEPHONE (OUTPATIENT)
Dept: INTERNAL MEDICINE | Facility: CLINIC | Age: 49
End: 2019-08-19

## 2019-08-19 DIAGNOSIS — R93.1 ABNORMAL ECHOCARDIOGRAM: Primary | ICD-10-CM

## 2019-08-19 RX ORDER — DULOXETIN HYDROCHLORIDE 30 MG/1
CAPSULE, DELAYED RELEASE ORAL
Qty: 180 CAPSULE | Refills: 1 | Status: SHIPPED | OUTPATIENT
Start: 2019-08-19 | End: 2020-02-10

## 2019-08-19 NOTE — TELEPHONE ENCOUNTER
I spoke with patient and informed of holter monitor (normal study) and echo (?artifact to aortic valve) will refer to cardiologist for further evaluation.

## 2019-08-19 NOTE — TELEPHONE ENCOUNTER
I called patient to discuss his echo results. There was no answer so I left a message for patient to return call.

## 2019-08-21 ENCOUNTER — OFFICE VISIT (OUTPATIENT)
Dept: PAIN MEDICINE | Facility: CLINIC | Age: 49
End: 2019-08-21

## 2019-08-21 VITALS
BODY MASS INDEX: 36.32 KG/M2 | DIASTOLIC BLOOD PRESSURE: 91 MMHG | WEIGHT: 231.4 LBS | OXYGEN SATURATION: 99 % | HEIGHT: 67 IN | SYSTOLIC BLOOD PRESSURE: 134 MMHG | HEART RATE: 75 BPM | RESPIRATION RATE: 18 BRPM | TEMPERATURE: 96.3 F

## 2019-08-21 DIAGNOSIS — Q85.00 NEUROFIBROMATOSIS (HCC): ICD-10-CM

## 2019-08-21 DIAGNOSIS — G89.4 CHRONIC PAIN SYNDROME: Primary | ICD-10-CM

## 2019-08-21 DIAGNOSIS — Z79.899 ENCOUNTER FOR LONG-TERM (CURRENT) USE OF HIGH-RISK MEDICATION: ICD-10-CM

## 2019-08-21 DIAGNOSIS — M79.2 NEUROPATHIC PAIN, ARM: ICD-10-CM

## 2019-08-21 PROCEDURE — 99214 OFFICE O/P EST MOD 30 MIN: CPT | Performed by: NURSE PRACTITIONER

## 2019-08-21 NOTE — PROGRESS NOTES
CHIEF COMPLAINT  Follow-up for neck and arm pain. Mr. De Luna states that his pain is a little better since his last appt.    Subjective   Isma De Luna is a 49 y.o. male  who presents to the office for follow-up.He has a history of neck pain, arm pain.    Reporting worsening right arm pain last visit. Given Depo Medrol 40 mg IM which has helped some.  Does have tumor that is being monitored currently. Surgery would be major and disabling.      Left arm stable since surgery.     Complains of pain in his neck and arms. Today his pain is 3/10VAS.  He is currently taking hydrocodone 10/325, usually takes once daily.  He also continues with Gabapentin 800 mg TID, Flexeril PRN, Cymbalta.  Reports moderate reduction in pain with this regimen. Denies adverse reactions.  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      Neck Pain    This is a chronic problem. The current episode started more than 1 month ago. The problem occurs constantly. The problem has been gradually improving. The pain is present in the midline and right side. The quality of the pain is described as aching, cramping and shooting. The pain is at a severity of 3/10. The symptoms are aggravated by twisting. Associated symptoms include numbness and weakness. Pertinent negatives include no chest pain, fever or headaches. He has tried acetaminophen, bed rest, heat, ice, NSAIDs, oral narcotics, muscle relaxants, neck support and home exercises (hydrocodone) for the symptoms. The treatment provided moderate relief.      PEG Assessment   What number best describes your pain on average in the past week?4  What number best describes how, during the past week, pain has interfered with your enjoyment of life?3  What number best describes how, during the past week, pain has interfered with your general activity?  3    The following portions of the patient's history were reviewed and updated as appropriate: allergies, current medications, past family history, past medical  "history, past social history, past surgical history and problem list.    Review of Systems   Constitutional: Positive for fatigue. Negative for fever.   HENT: Negative for congestion.    Eyes: Negative for visual disturbance.   Respiratory: Negative for cough, shortness of breath and wheezing.    Cardiovascular: Positive for palpitations. Negative for chest pain and leg swelling.   Gastrointestinal: Positive for constipation. Negative for diarrhea.   Genitourinary: Negative for difficulty urinating.   Musculoskeletal: Positive for neck pain.   Neurological: Positive for weakness and numbness. Negative for headaches.   Psychiatric/Behavioral: Negative for sleep disturbance and suicidal ideas. The patient is not nervous/anxious.      Vitals:    08/21/19 1042   BP: 134/91   Pulse: 75   Resp: 18   Temp: 96.3 °F (35.7 °C)   SpO2: 99%   Weight: 105 kg (231 lb 6.4 oz)   Height: 170.2 cm (67\")   PainSc:   3   PainLoc: Arm     Objective   Physical Exam   Constitutional: He is oriented to person, place, and time. Vital signs are normal. He appears well-developed and well-nourished.  Non-toxic appearance. No distress.   HENT:   Head: Normocephalic and atraumatic.   Nose: Nose normal.   Eyes: Conjunctivae and lids are normal.   Cardiovascular: Normal rate.   Pulmonary/Chest: Effort normal. No respiratory distress.   Abdominal: Normal appearance.   Musculoskeletal:        Left shoulder: He exhibits tenderness.        Cervical back: He exhibits tenderness.   Left neck surgical scar   Neurological: He is alert and oriented to person, place, and time. No cranial nerve deficit or sensory deficit. Gait normal.   Psychiatric: He has a normal mood and affect. His behavior is normal.   Nursing note and vitals reviewed.    Assessment/Plan   Isma was seen today for neck pain and arm pain.    Diagnoses and all orders for this visit:    Chronic pain syndrome    Neurofibromatosis (CMS/HCC)    Neuropathic pain, arm    Encounter for long-term " (current) use of high-risk medication      --- Continue Hydrocodone. Patient appears stable with current regimen. No adverse effects. Regarding continuation of opioids, there is no evidence of aberrant behavior or any red flags.  The patient continues with appropriate response to opioid therapy. ADL's remain intact by self.   --- The urine drug screen confirmation from 12/18/18 has been reviewed and the result is appropriate based on patient history and BIRGIT report  --- Trial Horizant instead of Gabapentin   --- Follow-up 2 months          BIRGIT REPORT    As part of the patient's treatment plan, I am prescribing controlled substances. The patient has been made aware of appropriate use of such medications, including potential risk of somnolence, limited ability to drive and/or work safely, and the potential for dependence or overdose. It has also bee made clear that these medications are for use by this patient only, without concomitant use of alcohol or other substances unless prescribed.     Patient has completed prescribing agreement detailing terms of continued prescribing of controlled substances, including monitoring BIRGIT reports, urine drug screening, and pill counts if necessary. The patient is aware that inappropriate use will results in cessation of prescribing such medications.    BIRGIT report has been reviewed and scanned into the patient's chart.    As the clinician, I personally reviewed the BIRGIT from 8/9/19 while the patient was in the office today.    History and physical exam exhibit continued safe and appropriate use of controlled substances.    EMR Dragon/Transcription disclaimer:   Much of this encounter note is an electronic transcription/translation of spoken language to printed text. The electronic translation of spoken language may permit erroneous, or at times, nonsensical words or phrases to be inadvertently transcribed; Although I have reviewed the note for such errors, some may  still exist.

## 2019-08-29 ENCOUNTER — OFFICE VISIT (OUTPATIENT)
Dept: INTERNAL MEDICINE | Facility: CLINIC | Age: 49
End: 2019-08-29

## 2019-08-29 VITALS
OXYGEN SATURATION: 98 % | HEART RATE: 74 BPM | HEIGHT: 67 IN | SYSTOLIC BLOOD PRESSURE: 124 MMHG | BODY MASS INDEX: 36.35 KG/M2 | DIASTOLIC BLOOD PRESSURE: 82 MMHG | WEIGHT: 231.6 LBS

## 2019-08-29 DIAGNOSIS — R93.1 ABNORMAL ECHOCARDIOGRAM: ICD-10-CM

## 2019-08-29 DIAGNOSIS — R00.2 PALPITATIONS: Primary | ICD-10-CM

## 2019-08-29 PROCEDURE — 99213 OFFICE O/P EST LOW 20 MIN: CPT | Performed by: NURSE PRACTITIONER

## 2019-08-29 NOTE — PROGRESS NOTES
Subjective   Isma De Luna is a 49 y.o. male.     He has had holter and echo for evaluation of palpitations. His holter monitor was normal (few pvc and pac noted). His ECHO results revealed EF 66% and the presence of an aortic valve mass could not be excluded. He is due to see cardiologist for further evaluation on 9/10/2019.       Palpitations    This is a new problem. The current episode started 1 to 4 weeks ago. The problem occurs intermittently. The problem has been gradually improving. The symptoms are aggravated by caffeine. Pertinent negatives include no anxiety, chest pain, coughing, fever, near-syncope, numbness, shortness of breath or vomiting.        The following portions of the patient's history were reviewed and updated as appropriate: allergies, current medications, past family history, past medical history, past social history, past surgical history and problem list.    Review of Systems   Constitutional: Negative for activity change, appetite change, fatigue and fever.   Respiratory: Negative for cough, shortness of breath and wheezing.    Cardiovascular: Positive for palpitations (mild improvement ). Negative for chest pain, leg swelling and near-syncope.   Gastrointestinal: Negative for vomiting.   Neurological: Negative for numbness.   Psychiatric/Behavioral: The patient is not nervous/anxious.        Objective   Physical Exam   Constitutional: He is oriented to person, place, and time. He appears well-developed and well-nourished.   HENT:   Head: Normocephalic.   Nose: Nose normal.   Neck: Carotid bruit is not present. No thyroid mass and no thyromegaly present.   Cardiovascular: Regular rhythm and normal heart sounds. Exam reveals no S3 and no S4.   No murmur heard.  Repeat bp left arm 122/78  No pedal edema    Pulmonary/Chest: Effort normal and breath sounds normal. He has no decreased breath sounds. He has no wheezes. He has no rhonchi. He has no rales.   Musculoskeletal: He exhibits no edema.    Neurological: He is alert and oriented to person, place, and time. Gait normal.   Skin: Skin is warm and dry.   Psychiatric: He has a normal mood and affect.       Assessment/Plan   Isma was seen today for palpitations.    Diagnoses and all orders for this visit:    Palpitations  Comments:  reduce/avoid caffiene, will monitor     Abnormal echocardiogram  Comments:  due to see cardiologist on 9/10/2019    I reviewed test results and labs.

## 2019-09-08 DIAGNOSIS — I10 ESSENTIAL HYPERTENSION: ICD-10-CM

## 2019-09-09 RX ORDER — AMLODIPINE BESYLATE 5 MG/1
5 TABLET ORAL DAILY
Qty: 30 TABLET | Refills: 5 | Status: SHIPPED | OUTPATIENT
Start: 2019-09-09 | End: 2020-03-02

## 2019-09-10 ENCOUNTER — OFFICE VISIT (OUTPATIENT)
Dept: CARDIOLOGY | Facility: CLINIC | Age: 49
End: 2019-09-10

## 2019-09-10 VITALS
HEART RATE: 73 BPM | WEIGHT: 233 LBS | SYSTOLIC BLOOD PRESSURE: 148 MMHG | DIASTOLIC BLOOD PRESSURE: 90 MMHG | HEIGHT: 67 IN | BODY MASS INDEX: 36.57 KG/M2

## 2019-09-10 DIAGNOSIS — Q23.9 ABNORMALITY OF AORTIC VALVE: ICD-10-CM

## 2019-09-10 DIAGNOSIS — I10 ESSENTIAL HYPERTENSION: Primary | ICD-10-CM

## 2019-09-10 PROCEDURE — 99244 OFF/OP CNSLTJ NEW/EST MOD 40: CPT | Performed by: INTERNAL MEDICINE

## 2019-09-10 PROCEDURE — 93000 ELECTROCARDIOGRAM COMPLETE: CPT | Performed by: INTERNAL MEDICINE

## 2019-09-12 NOTE — PROGRESS NOTES
Subjective:     Encounter Date:09/10/2019      Patient ID: Isma De Luna is a 49 y.o. male.    [facility] thank you for referring this patient for evaluation of an abnormal echocardiographic finding.      Chief Complaint: Abnormal echo.  History of Present Illness  49-year-old gentleman with a history of neurofibromatosis presents today for evaluation.  Patient has any intermittent episodes of palpitations.  They were exacerbated by caffeine intake.  Since he is decreased that he is been doing better.  In the work-up for his palpitations he had an echocardiogram done.  The result is noted below that there were fine strands noted off of the aortic valve.  With this finding he was referred for evaluation.  Patient denies any types of other cardiac symptoms.  He will admit he occasionally gets fatigued and occasionally get lightheaded if he stands up too fast.  He has quite a bit of pain from his neurofibromatosis and is followed by pain clinic.      Review of Systems   Constitution: Positive for malaise/fatigue.   Respiratory: Positive for snoring.    Musculoskeletal: Positive for myalgias.   Neurological: Positive for excessive daytime sleepiness, headaches and numbness.   Psychiatric/Behavioral: Positive for depression.         ECG 12 Lead  Date/Time: 9/10/2019 12:34 PM  Performed by: Patricio Castanon MD  Authorized by: Patricio Castanon MD   Comparison: compared with previous ECG from 7/31/2019  Similar to previous ECG  Rhythm: sinus rhythm  Conduction: right bundle branch block    Clinical impression: abnormal EKG              Interpretation Summary 8/13/19    · Calculated EF = 66.0%.  · Left ventricular systolic function is normal.  · The presence of an aortic valve mass cannot be excluded. There are fine strands noted..  · Consider CHIP if clinically indicated. This could be artifact but it is unclear.       Objective:     Physical Exam   Constitutional: He is oriented to person, place, and time. He  appears well-developed.   HENT:   Head: Normocephalic.   Eyes: Conjunctivae are normal.   Neck: Normal range of motion.   Cardiovascular: Normal rate, regular rhythm and normal heart sounds.   Pulmonary/Chest: Breath sounds normal.   Abdominal: Soft. Bowel sounds are normal.   Musculoskeletal: Normal range of motion. He exhibits no edema.   Neurological: He is alert and oriented to person, place, and time.   Skin: Skin is warm and dry.   Psychiatric: He has a normal mood and affect. His behavior is normal.   Vitals reviewed.      Lab Review:       Assessment:          Diagnosis Plan   1. Essential hypertension            Plan:       1.  Hypertension patient's blood pressure is a little elevated today.  I did discuss that with him is going to follow it I think it is because he was quite nervous about being evaluated and concerned about his heart and his echocardiographic findings.  2.  Abnormal echocardiographic findings.  I did personally look at the echo myself I reviewed personally with the patient and let him look at the pictures.  His symptoms are really not consistent with some type of endocarditis and he has not had any neurologic events.  I think he could easily represent an artifact in addition.  At this point I would repeat his echocardiogram in 6 months and see if any things change.  If it is unremarkable I would not do any intervention or think a further work-up is necessary at this point.  3.  Right bundle branch block.  This finding is old compared to earlier this year.  He really does not have any other conduction delay and no symptoms consistent with a significant rhythm issue.

## 2019-10-15 RX ORDER — GABAPENTIN ENACARBIL 600 MG/1
TABLET, EXTENDED RELEASE ORAL
Qty: 60 TABLET | Refills: 1 | Status: SHIPPED | OUTPATIENT
Start: 2019-10-15 | End: 2020-07-06

## 2019-10-22 ENCOUNTER — OFFICE VISIT (OUTPATIENT)
Dept: PAIN MEDICINE | Facility: CLINIC | Age: 49
End: 2019-10-22

## 2019-10-22 VITALS
RESPIRATION RATE: 18 BRPM | HEIGHT: 67 IN | SYSTOLIC BLOOD PRESSURE: 121 MMHG | BODY MASS INDEX: 36.73 KG/M2 | TEMPERATURE: 98.3 F | HEART RATE: 95 BPM | WEIGHT: 234 LBS | DIASTOLIC BLOOD PRESSURE: 79 MMHG | OXYGEN SATURATION: 96 %

## 2019-10-22 DIAGNOSIS — Q85.00 NEUROFIBROMATOSIS (HCC): ICD-10-CM

## 2019-10-22 DIAGNOSIS — Z79.899 ENCOUNTER FOR LONG-TERM (CURRENT) USE OF HIGH-RISK MEDICATION: ICD-10-CM

## 2019-10-22 DIAGNOSIS — M79.2 NEUROPATHIC PAIN, ARM: ICD-10-CM

## 2019-10-22 DIAGNOSIS — G89.4 CHRONIC PAIN SYNDROME: Primary | ICD-10-CM

## 2019-10-22 PROCEDURE — 99213 OFFICE O/P EST LOW 20 MIN: CPT | Performed by: NURSE PRACTITIONER

## 2019-10-22 NOTE — PROGRESS NOTES
CHIEF COMPLAINT  Follow-up for neck and arm pain. Mr. De Luna states that his pain is better since his last appt.    Subjective   Isma De Luna is a 49 y.o. male  who presents to the office for follow-up.He has a history of neck/arm pain.    Reporting worsening right arm pain last visit. Given Depo Medrol 40 mg IM which has helped some.  Does have tumor that is being monitored currently. Surgery would be major and disabling.       Left arm stable since surgery.      Complains of pain in his neck and arms. Today his pain is 2/10VAS.  He is currently taking hydrocodone 10/325, usually takes once daily.  He also continues with Gabapentin 800 mg TID, Flexeril PRN, Cymbalta.  Reports moderate reduction in pain with this regimen. Denies adverse reactions.  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     Switched from Gabapentin to Horizant last visit.  Says overall better pain control and less drowsiness.     Neck Pain    This is a chronic problem. The current episode started more than 1 month ago. The problem occurs constantly. The problem has been gradually improving. The pain is present in the midline and right side. The quality of the pain is described as aching, cramping and shooting. The pain is at a severity of 2/10. The symptoms are aggravated by twisting. Associated symptoms include numbness (left arm) and weakness (left arm). Pertinent negatives include no chest pain, fever or headaches. He has tried acetaminophen, bed rest, heat, ice, NSAIDs, oral narcotics, muscle relaxants, neck support and home exercises (hydrocodone) for the symptoms. The treatment provided moderate relief.     PEG Assessment   What number best describes your pain on average in the past week?3  What number best describes how, during the past week, pain has interfered with your enjoyment of life?2  What number best describes how, during the past week, pain has interfered with your general activity?  2    The following portions of the patient's  "history were reviewed and updated as appropriate: allergies, current medications, past family history, past medical history, past social history, past surgical history and problem list.    Review of Systems   Constitutional: Negative for fatigue and fever.   HENT: Positive for congestion.    Eyes: Negative for visual disturbance.   Respiratory: Negative for cough, shortness of breath and wheezing.    Cardiovascular: Negative.  Negative for chest pain.   Gastrointestinal: Negative for constipation and diarrhea.   Genitourinary: Negative for difficulty urinating.   Musculoskeletal: Positive for neck pain.   Neurological: Positive for weakness (left arm) and numbness (left arm). Negative for headaches.   Psychiatric/Behavioral: Negative for sleep disturbance and suicidal ideas. The patient is not nervous/anxious.      Vitals:    10/22/19 0845   BP: 121/79   Pulse: 95   Resp: 18   Temp: 98.3 °F (36.8 °C)   SpO2: 96%   Weight: 106 kg (234 lb)   Height: 170.2 cm (67\")   PainSc:   2   PainLoc: Arm     Objective   Physical Exam   Constitutional: He is oriented to person, place, and time. Vital signs are normal. He appears well-developed and well-nourished.  Non-toxic appearance. No distress.   HENT:   Head: Normocephalic and atraumatic.   Nose: Nose normal.   Eyes: Conjunctivae and lids are normal.   Cardiovascular: Normal rate.   Pulmonary/Chest: Effort normal. No respiratory distress.   Abdominal: Normal appearance.   Musculoskeletal:        Left shoulder: He exhibits tenderness.        Cervical back: He exhibits tenderness.   Left neck surgical scar   Neurological: He is alert and oriented to person, place, and time. No cranial nerve deficit or sensory deficit. Gait normal.   Psychiatric: He has a normal mood and affect. His behavior is normal.   Nursing note and vitals reviewed.    Assessment/Plan   Isma was seen today for neck pain and arm pain.    Diagnoses and all orders for this visit:    Chronic pain " syndrome    Neurofibromatosis (CMS/HCC)    Neuropathic pain, arm    Encounter for long-term (current) use of high-risk medication      --- Refill Hydrocodone. Patient appears stable with current regimen. No adverse effects. Regarding continuation of opioids, there is no evidence of aberrant behavior or any red flags.  The patient continues with appropriate response to opioid therapy. ADL's remain intact by self.   --- Routine ODT in office today as part of monitoring requirements for controlled substances.    This specimen will be sent to [facility] for confirmation.     --- Follow-up 2 months        BIRGIT REPORT  As part of the patient's treatment plan, I am prescribing controlled substances. The patient has been made aware of appropriate use of such medications, including potential risk of somnolence, limited ability to drive and/or work safely, and the potential for dependence or overdose. It has also bee made clear that these medications are for use by this patient only, without concomitant use of alcohol or other substances unless prescribed.     Patient has completed prescribing agreement detailing terms of continued prescribing of controlled substances, including monitoring BIRGIT reports, urine drug screening, and pill counts if necessary. The patient is aware that inappropriate use will results in cessation of prescribing such medications.    BIRGIT report has been reviewed and scanned into the patient's chart.    As the clinician, I personally reviewed the BIRGIT from 10/22/19 while the patient was in the office today.    History and physical exam exhibit continued safe and appropriate use of controlled substances.    EMR Dragon/Transcription disclaimer:   Much of this encounter note is an electronic transcription/translation of spoken language to printed text. The electronic translation of spoken language may permit erroneous, or at times, nonsensical words or phrases to be inadvertently  transcribed; Although I have reviewed the note for such errors, some may still exist.

## 2019-11-01 ENCOUNTER — OFFICE VISIT (OUTPATIENT)
Dept: INTERNAL MEDICINE | Facility: CLINIC | Age: 49
End: 2019-11-01

## 2019-11-01 VITALS
HEIGHT: 67 IN | OXYGEN SATURATION: 98 % | BODY MASS INDEX: 36.57 KG/M2 | WEIGHT: 233 LBS | HEART RATE: 78 BPM | DIASTOLIC BLOOD PRESSURE: 78 MMHG | SYSTOLIC BLOOD PRESSURE: 124 MMHG

## 2019-11-01 DIAGNOSIS — M54.2 NECK PAIN: Primary | ICD-10-CM

## 2019-11-01 DIAGNOSIS — D36.11 NEUROFIBROMA OF NECK: ICD-10-CM

## 2019-11-01 DIAGNOSIS — I10 ESSENTIAL HYPERTENSION: ICD-10-CM

## 2019-11-01 DIAGNOSIS — E78.49 OTHER HYPERLIPIDEMIA: ICD-10-CM

## 2019-11-01 PROCEDURE — 99213 OFFICE O/P EST LOW 20 MIN: CPT | Performed by: INTERNAL MEDICINE

## 2019-11-01 RX ORDER — METHYLPREDNISOLONE 4 MG/1
TABLET ORAL
Qty: 21 TABLET | Refills: 0 | Status: SHIPPED | OUTPATIENT
Start: 2019-11-01 | End: 2020-04-28

## 2019-11-01 NOTE — PROGRESS NOTES
"Subjective   Isma De Luna is a 49 y.o. male here for   Chief Complaint   Patient presents with   • Hypertension     4 month follow up fasting   • Hyperlipidemia   • Neck Pain   .    Vitals:    11/01/19 0947   BP: 124/78   BP Location: Right arm   Patient Position: Sitting   Cuff Size: Adult   Pulse: 78   SpO2: 98%   Weight: 106 kg (233 lb)   Height: 170.2 cm (67\")       Body mass index is 36.49 kg/m².    Hypertension   This is a chronic problem. The current episode started more than 1 year ago. The problem is unchanged. The problem is controlled. Associated symptoms include neck pain (left sided). Pertinent negatives include no chest pain, palpitations or shortness of breath.   Hyperlipidemia   This is a chronic problem. The current episode started more than 1 year ago. The problem is controlled. Recent lipid tests were reviewed and are normal. He has no history of diabetes. Pertinent negatives include no chest pain or shortness of breath.   Neck Pain    This is a recurrent problem. The current episode started more than 1 year ago. The problem occurs intermittently. The problem has been rapidly worsening. The quality of the pain is described as aching and shooting. The pain is moderate. Pertinent negatives include no chest pain or fever.        The following portions of the patient's history were reviewed and updated as appropriate: allergies, current medications, past social history and problem list.    Review of Systems   Constitutional: Negative for chills, fatigue (better since changing gabapentin to horizant) and fever.   Respiratory: Negative for cough, shortness of breath and wheezing.    Cardiovascular: Negative for chest pain, palpitations and leg swelling.   Musculoskeletal: Positive for neck pain (left sided).   Psychiatric/Behavioral: Positive for sleep disturbance (rarely). Negative for dysphoric mood. The patient is not nervous/anxious.        Objective   Physical Exam   Constitutional: He appears " well-developed and well-nourished. No distress.   Cardiovascular: Normal rate, regular rhythm and normal heart sounds.   Pulmonary/Chest: No respiratory distress. He has no wheezes. He has no rales. He exhibits no tenderness.   Musculoskeletal: He exhibits no edema.   Psychiatric: He has a normal mood and affect. His behavior is normal.   Nursing note and vitals reviewed.      Assessment/Plan   Diagnoses and all orders for this visit:    Neck pain  Comments:  worse - trial of medrol dose jeremiah - need f/u with pain management  Orders:  -     methylPREDNISolone (MEDROL) 4 MG tablet; follow package directions    Essential hypertension  Comments:  controlled - call if bp over 140/90    Other hyperlipidemia  Comments:  need diet/ex    Neurofibroma of neck  Comments:  recurrent problem - f/u with surgeon & pain management

## 2019-11-11 DIAGNOSIS — Z87.39 HISTORY OF GOUT: ICD-10-CM

## 2019-11-12 RX ORDER — FEBUXOSTAT 40 MG/1
TABLET, FILM COATED ORAL
Qty: 90 TABLET | Refills: 1 | Status: SHIPPED | OUTPATIENT
Start: 2019-11-12 | End: 2020-05-11

## 2019-11-12 RX ORDER — GABAPENTIN ENACARBIL 600 MG/1
TABLET, EXTENDED RELEASE ORAL
Qty: 60 TABLET | Refills: 1 | Status: SHIPPED | OUTPATIENT
Start: 2019-11-12 | End: 2020-02-11

## 2019-11-20 DIAGNOSIS — M79.2 NEUROPATHIC PAIN, ARM: ICD-10-CM

## 2019-11-20 RX ORDER — CYCLOBENZAPRINE HCL 10 MG
TABLET ORAL
Qty: 90 TABLET | Refills: 1 | Status: SHIPPED | OUTPATIENT
Start: 2019-11-20 | End: 2021-03-15

## 2019-12-08 DIAGNOSIS — I10 HYPERTENSION, BENIGN: ICD-10-CM

## 2019-12-09 RX ORDER — LOSARTAN POTASSIUM 100 MG/1
TABLET ORAL
Qty: 90 TABLET | Refills: 1 | Status: SHIPPED | OUTPATIENT
Start: 2019-12-09 | End: 2020-06-02

## 2019-12-20 ENCOUNTER — OFFICE VISIT (OUTPATIENT)
Dept: PAIN MEDICINE | Facility: CLINIC | Age: 49
End: 2019-12-20

## 2019-12-20 VITALS
TEMPERATURE: 97.6 F | SYSTOLIC BLOOD PRESSURE: 133 MMHG | HEART RATE: 77 BPM | RESPIRATION RATE: 20 BRPM | WEIGHT: 233.4 LBS | HEIGHT: 67 IN | BODY MASS INDEX: 36.63 KG/M2 | DIASTOLIC BLOOD PRESSURE: 84 MMHG | OXYGEN SATURATION: 95 %

## 2019-12-20 DIAGNOSIS — G89.4 CHRONIC PAIN SYNDROME: Primary | ICD-10-CM

## 2019-12-20 DIAGNOSIS — M54.2 NECK PAIN: ICD-10-CM

## 2019-12-20 DIAGNOSIS — G89.29 CHRONIC BILATERAL LOW BACK PAIN WITHOUT SCIATICA: ICD-10-CM

## 2019-12-20 DIAGNOSIS — M54.50 CHRONIC BILATERAL LOW BACK PAIN WITHOUT SCIATICA: ICD-10-CM

## 2019-12-20 DIAGNOSIS — Q85.00 NEUROFIBROMATOSIS (HCC): ICD-10-CM

## 2019-12-20 DIAGNOSIS — Z79.899 ENCOUNTER FOR LONG-TERM (CURRENT) USE OF HIGH-RISK MEDICATION: ICD-10-CM

## 2019-12-20 PROCEDURE — 99214 OFFICE O/P EST MOD 30 MIN: CPT | Performed by: NURSE PRACTITIONER

## 2019-12-20 NOTE — PROGRESS NOTES
CHIEF COMPLAINT  F/u left arm and neck pain. Pt sts his left shoulder feels more tense than the right, causing severe HA and is also increasing his neck pain. Pt sts flexeril has helped the tense muscles better than the norco.      Subjective   Isma De Luna is a 49 y.o. male  who presents to the office for follow-up.He has a history of back pain, neck pain, neurofibromatosis.    Reporting worsening right arm pain last visit. Given Depo Medrol 40 mg IM which has helped some.  Does have tumor that is being monitored currently. Surgery would be major and disabling.       Left arm stable since surgery.      Complains of pain in his neck and arms. Today his pain is 2/10VAS.  He is currently taking hydrocodone 10/325 very sparingly, maybe once a week.  He also continues with Gabapentin 800 mg TID, Flexeril PRN, Cymbalta.  Reports moderate reduction in pain with this regimen. Denies adverse reactions.  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      Switched from Gabapentin to Horizant last visit.  Says overall better pain control and less drowsiness.     Neck Pain    This is a chronic problem. The current episode started more than 1 month ago. The problem occurs constantly. The problem has been gradually improving. The pain is present in the midline and right side. The quality of the pain is described as aching, cramping and shooting. The pain is at a severity of 2/10. The symptoms are aggravated by twisting. Associated symptoms include headaches (freq), numbness (left arm) and weakness (left arm). Pertinent negatives include no chest pain or fever. He has tried acetaminophen, bed rest, heat, ice, NSAIDs, oral narcotics, muscle relaxants, neck support and home exercises (hydrocodone) for the symptoms. The treatment provided moderate relief.      PEG Assessment   What number best describes your pain on average in the past week?3  What number best describes how, during the past week, pain has interfered with your enjoyment  "of life?1  What number best describes how, during the past week, pain has interfered with your general activity?  0    The following portions of the patient's history were reviewed and updated as appropriate: allergies, current medications, past family history, past medical history, past social history, past surgical history and problem list.    Review of Systems   Constitutional: Negative for activity change, fatigue and fever.   HENT: Positive for congestion (allergies).    Eyes: Negative for visual disturbance.   Respiratory: Negative for cough, shortness of breath and wheezing.    Cardiovascular: Negative.  Negative for chest pain.   Gastrointestinal: Negative for constipation and diarrhea.   Genitourinary: Negative for difficulty urinating.   Musculoskeletal: Positive for arthralgias (left shoulder), neck pain and neck stiffness. Negative for back pain.   Allergic/Immunologic: Negative for immunocompromised state.   Neurological: Positive for weakness (left arm), numbness (left arm) and headaches (freq). Negative for dizziness and light-headedness.   Psychiatric/Behavioral: Negative for agitation, sleep disturbance and suicidal ideas. The patient is not nervous/anxious.      Vitals:    12/20/19 0859   BP: 133/84   Pulse: 77   Resp: 20   Temp: 97.6 °F (36.4 °C)   SpO2: 95%   Weight: 106 kg (233 lb 6.4 oz)   Height: 170.2 cm (67\")   PainSc:   2   PainLoc: Arm     Objective   Physical Exam   Constitutional: He is oriented to person, place, and time. Vital signs are normal. He appears well-developed and well-nourished.  Non-toxic appearance. No distress.   HENT:   Head: Normocephalic and atraumatic.   Nose: Nose normal.   Eyes: Conjunctivae and lids are normal.   Cardiovascular: Normal rate.   Pulmonary/Chest: Effort normal. No respiratory distress.   Abdominal: Normal appearance.   Musculoskeletal:        Left shoulder: He exhibits tenderness.        Cervical back: He exhibits tenderness.   Left neck surgical scar "   Neurological: He is alert and oriented to person, place, and time. No cranial nerve deficit or sensory deficit. Gait normal.   Psychiatric: He has a normal mood and affect. His behavior is normal.   Nursing note and vitals reviewed.    Assessment/Plan   Isma was seen today for arm pain and neck pain.    Diagnoses and all orders for this visit:    Chronic pain syndrome    Chronic bilateral low back pain without sciatica    Neurofibromatosis (CMS/HCC)    Encounter for long-term (current) use of high-risk medication    Neck pain      --- Consider PT if left shoulder pain/tightness worsens   --- Continue Horizant   --- Continue Hydrocodone. Patient appears stable with current regimen. No adverse effects. Regarding continuation of opioids, there is no evidence of aberrant behavior or any red flags.  The patient continues with appropriate response to opioid therapy. ADL's remain intact by self.   --- The urine drug screen confirmation from 10/22/19 has been reviewed and the result is appropriate based on patient history and BIRGIT report  --- Follow-up 3 months        BIRGIT REPORT  As part of the patient's treatment plan, I am prescribing controlled substances. The patient has been made aware of appropriate use of such medications, including potential risk of somnolence, limited ability to drive and/or work safely, and the potential for dependence or overdose. It has also bee made clear that these medications are for use by this patient only, without concomitant use of alcohol or other substances unless prescribed.     Patient has completed prescribing agreement detailing terms of continued prescribing of controlled substances, including monitoring BIRGIT reports, urine drug screening, and pill counts if necessary. The patient is aware that inappropriate use will results in cessation of prescribing such medications.    BIRGIT report has been reviewed and scanned into the patient's chart.    As the clinician, I  personally reviewed the BIRGIT from 12/20/19 while the patient was in the office today.    History and physical exam exhibit continued safe and appropriate use of controlled substances.    EMR Dragon/Transcription disclaimer:   Much of this encounter note is an electronic transcription/translation of spoken language to printed text. The electronic translation of spoken language may permit erroneous, or at times, nonsensical words or phrases to be inadvertently transcribed; Although I have reviewed the note for such errors, some may still exist.

## 2019-12-23 DIAGNOSIS — K21.9 GASTROESOPHAGEAL REFLUX DISEASE WITHOUT ESOPHAGITIS: ICD-10-CM

## 2019-12-23 RX ORDER — OMEPRAZOLE 40 MG/1
CAPSULE, DELAYED RELEASE ORAL
Qty: 90 CAPSULE | Refills: 1 | Status: SHIPPED | OUTPATIENT
Start: 2019-12-23 | End: 2020-06-22

## 2020-02-10 RX ORDER — DULOXETIN HYDROCHLORIDE 30 MG/1
CAPSULE, DELAYED RELEASE ORAL
Qty: 180 CAPSULE | Refills: 1 | Status: SHIPPED | OUTPATIENT
Start: 2020-02-10 | End: 2020-10-09 | Stop reason: ALTCHOICE

## 2020-02-11 RX ORDER — GABAPENTIN ENACARBIL 600 MG/1
TABLET, EXTENDED RELEASE ORAL
Qty: 60 TABLET | Refills: 3 | Status: SHIPPED | OUTPATIENT
Start: 2020-02-11 | End: 2020-06-05 | Stop reason: SDUPTHER

## 2020-02-20 ENCOUNTER — PRIOR AUTHORIZATION (OUTPATIENT)
Dept: INTERNAL MEDICINE | Facility: CLINIC | Age: 50
End: 2020-02-20

## 2020-02-20 NOTE — TELEPHONE ENCOUNTER
Prior authorization has been approved from 1/21/2020 through 8/18/2020.    A copy of this approval letter has been faxed to patients pharmacy.

## 2020-03-02 DIAGNOSIS — I10 ESSENTIAL HYPERTENSION: ICD-10-CM

## 2020-03-02 RX ORDER — AMLODIPINE BESYLATE 5 MG/1
5 TABLET ORAL DAILY
Qty: 30 TABLET | Refills: 5 | Status: SHIPPED | OUTPATIENT
Start: 2020-03-02 | End: 2020-08-14

## 2020-03-11 ENCOUNTER — OFFICE VISIT (OUTPATIENT)
Dept: CARDIOLOGY | Facility: CLINIC | Age: 50
End: 2020-03-11

## 2020-03-11 ENCOUNTER — HOSPITAL ENCOUNTER (OUTPATIENT)
Dept: CARDIOLOGY | Facility: HOSPITAL | Age: 50
Discharge: HOME | End: 2020-03-11
Admitting: INTERNAL MEDICINE

## 2020-03-11 VITALS
DIASTOLIC BLOOD PRESSURE: 90 MMHG | OXYGEN SATURATION: 96 % | SYSTOLIC BLOOD PRESSURE: 148 MMHG | HEART RATE: 65 BPM | BODY MASS INDEX: 36.57 KG/M2 | WEIGHT: 233 LBS | HEIGHT: 67 IN

## 2020-03-11 VITALS
OXYGEN SATURATION: 98 % | HEART RATE: 62 BPM | DIASTOLIC BLOOD PRESSURE: 90 MMHG | SYSTOLIC BLOOD PRESSURE: 148 MMHG | BODY MASS INDEX: 36.57 KG/M2 | WEIGHT: 233 LBS | HEIGHT: 67 IN

## 2020-03-11 DIAGNOSIS — I10 ESSENTIAL HYPERTENSION: ICD-10-CM

## 2020-03-11 DIAGNOSIS — Q23.9 ABNORMALITY OF AORTIC VALVE: ICD-10-CM

## 2020-03-11 DIAGNOSIS — Q23.9 ABNORMALITY OF AORTIC VALVE: Primary | ICD-10-CM

## 2020-03-11 DIAGNOSIS — R00.2 PALPITATIONS: ICD-10-CM

## 2020-03-11 LAB
AORTIC ARCH: 2.6 CM
AORTIC ROOT ANNULUS: 2.1 CM
ASCENDING AORTA: 3.7 CM
BH CV ECHO MEAS - AO MAX PG: 10 MMHG
BH CV ECHO MEAS - AO MEAN PG: 5 MMHG
BH CV ECHO MEAS - AO V2 MAX: 155 CM/SEC
BH CV ECHO MEAS - AO V2 VTI: 31 CM
BH CV ECHO MEAS - AVA(I,D): 1.3 CM2
BH CV ECHO MEAS - EF(MOD-BP): 72 %
BH CV ECHO MEAS - IVSD: 1 CM
BH CV ECHO MEAS - LAT PEAK E' VEL: 13 CM/SEC
BH CV ECHO MEAS - LV MAX PG: 7 MMHG
BH CV ECHO MEAS - LV MEAN PG: 4 MMHG
BH CV ECHO MEAS - LV V1 MAX: 136 CM/SEC
BH CV ECHO MEAS - LV V1 VTI: 26 CM
BH CV ECHO MEAS - LVIDD: 3.9 CM
BH CV ECHO MEAS - LVIDS: 2.5 CM
BH CV ECHO MEAS - LVOT DIAM: 2 CM
BH CV ECHO MEAS - LVPWD: 1 CM
BH CV ECHO MEAS - MED PEAK E' VEL: 12 CM/SEC
BH CV ECHO MEAS - MV A DUR: 140 SEC
BH CV ECHO MEAS - MV A MAX VEL: 81 CM/SEC
BH CV ECHO MEAS - MV DEC TIME: 180 MSEC
BH CV ECHO MEAS - MV E MAX VEL: 95 CM/SEC
BH CV ECHO MEAS - MV E/A: 1.2
BH CV ECHO MEAS - PULM A REVS DUR: 95 SEC
BH CV ECHO MEAS - PULM A REVS VEL: 41 CM/SEC
BH CV ECHO MEAS - PULM DIAS VEL: 62 CM/SEC
BH CV ECHO MEAS - PULM SYS VEL: 67 CM/SEC
BH CV ECHO MEAS - RAP SYSTOLE: 3 MMHG
BH CV ECHO MEAS - RV MAX PG: 4 MMHG
BH CV ECHO MEAS - RV V1 MAX: 105 CM/SEC
BH CV ECHO MEAS - RVOT DIAM: 1.7 CM
BH CV ECHO MEAS - SUP REN AO DIAM: 2.2 CM
BH CV ECHO MEAS - TAPSE (>1.6): 2.7 CM2
BH CV ECHO MEASUREMENTS AVERAGE E/E' RATIO: 7.6
BH CV XLRA - RV BASE: 3.5 CM
BH CV XLRA - RV LENGTH: 7.2 CM
BH CV XLRA - RV MID: 2.9 CM
BH CV XLRA - TDI S': 18 CM/SEC
LEFT ATRIUM VOLUME INDEX: 28 ML/M2
MAXIMAL PREDICTED HEART RATE: 170 BPM
SINUS: 3.4 CM
STJ: 3 CM
STRESS TARGET HR: 145 BPM

## 2020-03-11 PROCEDURE — 93306 TTE W/DOPPLER COMPLETE: CPT | Performed by: INTERNAL MEDICINE

## 2020-03-11 PROCEDURE — 93306 TTE W/DOPPLER COMPLETE: CPT

## 2020-03-11 PROCEDURE — 25010000002 PERFLUTREN (DEFINITY) 8.476 MG IN SODIUM CHLORIDE 0.9 % 10 ML INJECTION: Performed by: INTERNAL MEDICINE

## 2020-03-11 PROCEDURE — 99214 OFFICE O/P EST MOD 30 MIN: CPT | Performed by: NURSE PRACTITIONER

## 2020-03-11 RX ADMIN — PERFLUTREN 1.5 ML: 6.52 INJECTION, SUSPENSION INTRAVENOUS at 11:04

## 2020-03-11 NOTE — PROGRESS NOTES
[facility] CARDIOLOGY  [address]  [facility] [address]  Phone: [phone]      Patient Name: Isma De Luna  :1970  Age: 50 y.o.  Primary Cardiologist: Patricio Castanon MD  Encounter Provider:  EMILIE Ortega      Chief Complaint:   Chief Complaint   Patient presents with   • Follow-up     6 months         HPI  sIma De Luna is a 50 y.o. male who presents today for semiannual evaluation.     Pt has a  history significant for hypertension, hyperlipidemia, stomach cancer, stage III renal disease, RBBB.    Patient had echocardiogram in 2019 which revealed a questionable aortic valve mass with noted stranding on the valve.  Patient had echocardiogram prior to appointment today to further evaluate.  Patient reports that over the past 6 months he has done well.  He states that he is very active but does not do any formal exercise.  Patient reports occasional episodes of heart palpitations but states that these are controlled.  He denies any chest pain, shortness of breath, dyspnea with exertion, lightheadedness, lower extremity edema, fatigue.  Patient reports that he checks his blood pressure routinely at home where it averages in the 120s-130s/80s.  Patient reports history of whitecoat syndrome.    The following portions of the patient's history were reviewed and updated as appropriate: allergies, current medications, past family history, past medical history, past social history, past surgical history and problem list.      Review of Systems   Constitution: Negative for malaise/fatigue.   Cardiovascular: Positive for palpitations. Negative for chest pain and leg swelling.   Respiratory: Negative for shortness of breath.    Neurological: Negative for light-headedness.   All other systems reviewed and are negative.      OBJECTIVE:     Vitals:    20 1102   BP: 148/90   BP Location: Right arm   Patient Position: Sitting   Pulse: 62   SpO2: 98%  "  Weight: 106 kg (233 lb)   Height: 170.2 cm (67\")     Body mass index is 36.49 kg/m².    Physical Exam   Constitutional: He is oriented to person, place, and time. Vital signs are normal. He appears well-developed and well-nourished.   Eyes: Conjunctivae are normal.   Neck: Carotid bruit is not present.   Cardiovascular: Normal rate, regular rhythm and normal heart sounds.   No murmur heard.  Pulmonary/Chest: Effort normal and breath sounds normal.   Abdominal: Normal appearance.   Musculoskeletal: Normal range of motion.   No pedal edema   Neurological: He is alert and oriented to person, place, and time. GCS eye subscore is 4. GCS verbal subscore is 5. GCS motor subscore is 6.   Skin: Skin is warm and dry.   Psychiatric: He has a normal mood and affect. His speech is normal and behavior is normal. Judgment and thought content normal. Cognition and memory are normal.       Procedures    Cardiac Procedures:  1. Echocardiogram 8/13/2019.  LVEF 66%.  Presence of aortic valve mass cannot be excluded.  Consider CHIP if clinically indicated but this could also be artifact.  2. 24-hour Holter 8/15/2019.  Normal monitor study.        ASSESSMENT:      Diagnosis Plan   1. Abnormality of aortic valve     2. Essential hypertension     3. Palpitations           PLAN OF CARE:     1. Patient with history of aortic valve abnormality.  Echocardiogram in August 2019 revealed questionable aortic valve mass with noted stranding on the valve.  Patient had repeat echocardiogram today to assess stability.  I have personally reviewed the images with Dr. Castanon who interpreted the echocardiogram.  There is no mass on the aortic valve.  There is some straining on the valve that is not causing any valvular incompetence.  Patient is asymptomatic.  Results were reviewed with the patient in office.  Recommendation will be repeat echocardiograms every 3 to 5 years unless patient develops symptoms sooner.  2. Hypertension.  Blood pressure " elevated in office today 148/90.  Patient reports that he has a history of whitecoat syndrome.  Patient checks his blood pressure regularly at home where it averages in the 120s-130s/80s.  Patient will continue with amlodipine 5 mg daily, losartan 100 mg daily.  3. Palpitations.  Patient reports occasional episodes of palpitations that are very brief and controlled.  4. Follow-up with Dr. Castanon in 1 year.  Sooner for problems or complications.             Discharge Medications           Accurate as of March 11, 2020 11:59 AM. If you have any questions, ask your nurse or doctor.               Changes to Medications      Instructions Start Date   sildenafil 20 MG tablet  Commonly known as:  REVATIO  What changed:    · when to take this  · reasons to take this   20 mg, Oral, Daily         Continue These Medications      Instructions Start Date   amLODIPine 5 MG tablet  Commonly known as:  NORVASC   5 mg, Oral, Daily      cyclobenzaprine 10 MG tablet  Commonly known as:  FLEXERIL   TAKE 1 TABLET BY MOUTH THREE TIMES DAILY AS NEEDED FOR MUSCLE SPASMS      DULoxetine 30 MG capsule  Commonly known as:  CYMBALTA   TAKE 1 CAPSULE BY MOUTH TWICE DAILY      febuxostat 40 MG tablet  Commonly known as:  ULORIC   TAKE 1 TABLET BY MOUTH DAILY INSTEAD OF ALLOPURINOL      fexofenadine 180 MG tablet  Commonly known as:  ALLEGRA   180 mg, Oral, Daily      HM VITAMIN B100 COMPLEX PO   1 tablet, Oral, Daily      HORIZANT 600 MG tablet controlled-release  Generic drug:  Gabapentin Enacarbil ER   TAKE 600 MG BY MOUTH 2 (TWO) TIMES A DAY.      Gabapentin Enacarbil  MG tablet controlled-release   TAKE 600 MG BY MOUTH 2 (TWO) TIMES A DAY.      HORIZANT 600 MG tablet controlled-release  Generic drug:  Gabapentin Enacarbil ER   TAKE 1 TABLET BY MOUTH 2 TIMES DAILY      HYDROcodone-acetaminophen  MG per tablet  Commonly known as:  NORCO   1 tablet, Oral, Every 4 Hours PRN      IRON SUPPLEMENT 325 (65 FE) MG tablet  Generic drug:   ferrous sulfate   45 mg, Oral, Daily      losartan 100 MG tablet  Commonly known as:  COZAAR   TAKE 1 TABLET BY MOUTH DAILY      methylPREDNISolone 4 MG tablet  Commonly known as:  MEDROL   follow package directions      MULTI-VITAMIN DAILY PO   Oral, Daily      omeprazole 40 MG capsule  Commonly known as:  priLOSEC   TAKE 1 CAPSULE BY MOUTH DAILY      Vitamin B-12 1000 MCG sublingual tablet   Sublingual      vitamin B-6 50 MG tablet  Commonly known as:  PYRIDOXINE   100 mg, Oral, Daily      Vitamin D3 50 MCG (2000 UT) tablet   1,000 mg, Oral, Daily             Thank you for allowing me to participate in the care of your patient,         EMILIE Ortega  [facility]  03/11/20  11:10 AM      **Marin Disclaimer:**  Much of this encounter note is an electronic transcription/translation of spoken language to printed text. The electronic translation of spoken language may permit erroneous, or at times, nonsensical words or phrases to be inadvertently transcribed. Although I have reviewed the note for such errors, some may still exist.

## 2020-04-28 ENCOUNTER — TELEMEDICINE (OUTPATIENT)
Dept: INTERNAL MEDICINE | Facility: CLINIC | Age: 50
End: 2020-04-28

## 2020-04-28 VITALS — SYSTOLIC BLOOD PRESSURE: 125 MMHG | DIASTOLIC BLOOD PRESSURE: 85 MMHG

## 2020-04-28 DIAGNOSIS — Q85.00 NEUROFIBROMATOSIS (HCC): ICD-10-CM

## 2020-04-28 DIAGNOSIS — Z11.59 SCREENING FOR VIRAL DISEASE: ICD-10-CM

## 2020-04-28 DIAGNOSIS — Z87.39 HISTORY OF GOUT: ICD-10-CM

## 2020-04-28 DIAGNOSIS — R53.82 CHRONIC FATIGUE: ICD-10-CM

## 2020-04-28 DIAGNOSIS — G89.4 CHRONIC PAIN SYNDROME: ICD-10-CM

## 2020-04-28 DIAGNOSIS — E53.8 LOW VITAMIN B12 LEVEL: ICD-10-CM

## 2020-04-28 DIAGNOSIS — Z12.5 PROSTATE CANCER SCREENING: ICD-10-CM

## 2020-04-28 DIAGNOSIS — E78.49 OTHER HYPERLIPIDEMIA: ICD-10-CM

## 2020-04-28 DIAGNOSIS — I10 ESSENTIAL HYPERTENSION: Primary | ICD-10-CM

## 2020-04-28 PROCEDURE — 99214 OFFICE O/P EST MOD 30 MIN: CPT | Performed by: INTERNAL MEDICINE

## 2020-04-28 NOTE — PROGRESS NOTES
Subjective   Isma De Luna is a 50 y.o. male here for   Chief Complaint   Patient presents with   • Hypertension   • Hyperlipidemia   .    Vitals:    04/28/20 1041   BP: 125/85       There is no height or weight on file to calculate BMI.    Hypertension   This is a chronic problem. The current episode started more than 1 year ago. The problem is unchanged. The problem is controlled. Associated symptoms include neck pain. Pertinent negatives include no chest pain, palpitations or shortness of breath.   Hyperlipidemia   This is a chronic problem. The current episode started more than 1 year ago. The problem is controlled. Recent lipid tests were reviewed and are normal. He has no history of diabetes. Pertinent negatives include no chest pain or shortness of breath.        The following portions of the patient's history were reviewed and updated as appropriate: allergies, current medications, past social history and problem list.    Review of Systems   Constitutional: Negative for chills, fatigue and fever.   Respiratory: Negative for cough, shortness of breath and wheezing.    Cardiovascular: Negative for chest pain, palpitations and leg swelling.   Musculoskeletal: Positive for arthralgias (arms, shoulders), back pain and neck pain.   Allergic/Immunologic: Positive for environmental allergies.   Psychiatric/Behavioral: Negative for dysphoric mood and sleep disturbance. The patient is not nervous/anxious.        Objective   Physical Exam   Constitutional: He appears well-developed and well-nourished. No distress.   Neurological: He is alert. No cranial nerve deficit.   Skin: He is not diaphoretic.   Psychiatric: He has a normal mood and affect. His behavior is normal. Judgment and thought content normal.       Assessment/Plan   Diagnoses and all orders for this visit:    Essential hypertension  -     CBC & Differential; Future  -     Lipid Panel; Future  -     Urinalysis With Microscopic If Indicated (No Culture) -  Urine, Clean Catch; Future  -     Comprehensive Metabolic Panel; Future    Other hyperlipidemia  -     Lipid Panel; Future  -     Comprehensive Metabolic Panel; Future    Chronic pain syndrome  Comments:  better with horizant - ok to use occ narcotic per pain management    Neurofibromatosis (CMS/HCC)    Low vitamin B12 level  Comments:  continue b12 sl  Orders:  -     Vitamin B12 & Folate; Future    Chronic fatigue  -     TSH Rfx On Abnormal To Free T4; Future    Prostate cancer screening  -     PSA Screen; Future    Screening for viral disease  -     Hepatitis C antibody; Future  -     HIV-1 / O / 2 Ag / Antibody 4th Generation; Future    History of gout  -     Uric Acid; Future     Video visit for 30 minutes today.      You have chosen to receive care through a telehealth visit.  Do you consent to use a video/audio connection for your medical care today? Yes     Return 6 mos for CPE.

## 2020-04-28 NOTE — PATIENT INSTRUCTIONS
For high bp-need weekly bp chk & call if over 130/80.  For high chol - need diet/ex. Recheck labs in 2-3 mos.  For abnormal kidney test-need incr water intake. Recheck 2-3 mos.  For fatigue, b12 def, h/o gout,etc - need labs in next sev mos.

## 2020-05-10 DIAGNOSIS — Z87.39 HISTORY OF GOUT: ICD-10-CM

## 2020-05-11 RX ORDER — FEBUXOSTAT 40 MG/1
TABLET, FILM COATED ORAL
Qty: 90 TABLET | Refills: 1 | Status: SHIPPED | OUTPATIENT
Start: 2020-05-11 | End: 2020-11-10

## 2020-05-18 ENCOUNTER — OFFICE VISIT (OUTPATIENT)
Dept: PAIN MEDICINE | Facility: CLINIC | Age: 50
End: 2020-05-18

## 2020-05-18 ENCOUNTER — RESULTS ENCOUNTER (OUTPATIENT)
Dept: PAIN MEDICINE | Facility: CLINIC | Age: 50
End: 2020-05-18

## 2020-05-18 VITALS
RESPIRATION RATE: 20 BRPM | TEMPERATURE: 97.5 F | WEIGHT: 238.2 LBS | HEIGHT: 67 IN | OXYGEN SATURATION: 97 % | SYSTOLIC BLOOD PRESSURE: 114 MMHG | HEART RATE: 76 BPM | DIASTOLIC BLOOD PRESSURE: 79 MMHG | BODY MASS INDEX: 37.39 KG/M2

## 2020-05-18 DIAGNOSIS — M54.50 CHRONIC BILATERAL LOW BACK PAIN WITHOUT SCIATICA: ICD-10-CM

## 2020-05-18 DIAGNOSIS — G89.4 CHRONIC PAIN SYNDROME: ICD-10-CM

## 2020-05-18 DIAGNOSIS — G89.29 CHRONIC BILATERAL LOW BACK PAIN WITHOUT SCIATICA: ICD-10-CM

## 2020-05-18 DIAGNOSIS — Z79.899 ENCOUNTER FOR LONG-TERM (CURRENT) USE OF HIGH-RISK MEDICATION: Primary | ICD-10-CM

## 2020-05-18 DIAGNOSIS — Q85.00 NEUROFIBROMATOSIS (HCC): ICD-10-CM

## 2020-05-18 DIAGNOSIS — Z79.899 ENCOUNTER FOR LONG-TERM (CURRENT) USE OF HIGH-RISK MEDICATION: ICD-10-CM

## 2020-05-18 LAB
POC AMPHETAMINES: NEGATIVE
POC BARBITURATES: NEGATIVE
POC BENZODIAZEPHINES: NEGATIVE
POC COCAINE: NEGATIVE
POC METHADONE: NEGATIVE
POC METHAMPHETAMINE SCREEN URINE: NEGATIVE
POC OPIATES: NEGATIVE
POC OXYCODONE: NEGATIVE
POC PHENCYCLIDINE: NEGATIVE
POC PROPOXYPHENE: NEGATIVE
POC THC: NEGATIVE
POC TRICYCLIC ANTIDEPRESSANTS: NEGATIVE

## 2020-05-18 PROCEDURE — 99214 OFFICE O/P EST MOD 30 MIN: CPT | Performed by: NURSE PRACTITIONER

## 2020-05-18 PROCEDURE — 80305 DRUG TEST PRSMV DIR OPT OBS: CPT | Performed by: NURSE PRACTITIONER

## 2020-05-18 NOTE — PROGRESS NOTES
CHIEF COMPLAINT  F/u neck and left arm pain. Pt sts pain has remained the same. Pt sts he feels a hypersensitivity in bilat forearms lately, from caretaking of his brother and typing from work, he sts he plans on scheduling an appt with Dr. Sexton for a steroid inj which usually provides his arms with 9 months of relief. Pt sts flexeril improves pain better than norco.     Initial evaluation by EMILIE Guajardo Calixto is a 50 y.o. male  who presents for follow-up.He has a history of neck and left arm pain.  Today his pain is 1/10VAAS in severity. He is currently taking hydrocodone 10/325 very sparingly, 1 tablet every 2-3 weeks.  He also continues with Horizant 600 mg BID, Flexeril PRN (2-3 times a month), Cymbalta 30 mg daily.     He states he continues to tolerate Horizant better then he tolerated Gabapentin.  He states he no longer has the somnolence issues which were previously interfering with work and driving.     He states he has an occasional flare up in the right axial where another tumor has occurred. He also complains of pain in his left and right wrists and states he is due for a steroid injection in his left wrist with Dr. Melgar.     Neck Pain    This is a chronic problem. The current episode started more than 1 month ago. The problem occurs constantly. The problem has been gradually improving. The pain is present in the midline and right side. The quality of the pain is described as aching, cramping and shooting. The pain is at a severity of 1/10. The symptoms are aggravated by twisting. Associated symptoms include headaches (freq), numbness (bilat arms) and weakness (left arm). Pertinent negatives include no chest pain or fever. He has tried acetaminophen, bed rest, heat, ice, NSAIDs, oral narcotics, muscle relaxants, neck support and home exercises (hydrocodone, Horizant, Flexeril) for the symptoms. The treatment provided moderate relief.      PEG Assessment   What number  "best describes your pain on average in the past week?4  What number best describes how, during the past week, pain has interfered with your enjoyment of life?4  What number best describes how, during the past week, pain has interfered with your general activity?  4    The following portions of the patient's history were reviewed and updated as appropriate: allergies, current medications, past family history, past medical history, past social history, past surgical history and problem list.    Review of Systems   Constitutional: Negative for activity change, fatigue and fever.   HENT: Negative for congestion (allergies).    Eyes: Negative for visual disturbance.   Respiratory: Negative for cough, shortness of breath and wheezing.    Cardiovascular: Negative.  Negative for chest pain.   Gastrointestinal: Negative for constipation and diarrhea.   Genitourinary: Negative for difficulty urinating.   Musculoskeletal: Positive for arthralgias (left shoulder), neck pain and neck stiffness. Negative for back pain.   Allergic/Immunologic: Negative for immunocompromised state.   Neurological: Positive for weakness (left arm), numbness (bilat arms) and headaches (freq). Negative for dizziness and light-headedness.   Psychiatric/Behavioral: Negative for agitation, sleep disturbance and suicidal ideas. The patient is not nervous/anxious.      Vitals:    05/18/20 1347   BP: 114/79   Pulse: 76   Resp: 20   Temp: 97.5 °F (36.4 °C)   SpO2: 97%   Weight: 108 kg (238 lb 3.2 oz)   Height: 170.2 cm (67\")   PainSc:   1   PainLoc: Neck     Objective   Physical Exam   Constitutional: He is oriented to person, place, and time. Vital signs are normal. He appears well-developed and well-nourished.   HENT:   Head: Normocephalic and atraumatic.   Eyes: Conjunctivae, EOM and lids are normal.   Neck: Trachea normal. Decreased range of motion present.   Cardiovascular: Normal rate.   Pulmonary/Chest: Effort normal.   Abdominal: Normal appearance. "   Musculoskeletal:        Right wrist: He exhibits tenderness.        Left wrist: He exhibits tenderness.        Cervical back: He exhibits decreased range of motion and tenderness.   Neurological: He is alert and oriented to person, place, and time.   Skin: Skin is warm, dry and intact.   Psychiatric: He has a normal mood and affect. His speech is normal and behavior is normal. Judgment normal.   Nursing note and vitals reviewed.    Assessment/Plan   Isma was seen today for neck pain and arm pain.    Diagnoses and all orders for this visit:    Encounter for long-term (current) use of high-risk medication    Neurofibromatosis (CMS/HCC)    Chronic bilateral low back pain without sciatica    Chronic pain syndrome      --- Routine UDS in office today as part of monitoring requirements for controlled substances.  The specimen was viewed and the immunoassay result reviewed and is NEG (takes norco very sparingly).  This specimen will be sent to [facility] laboratory for confirmation.     --- Continue use of Hydrocodone 10/325 sparingly. Patient appears stable with current regimen. No adverse effects. Regarding continuation of opioids, there is no evidence of aberrant behavior or any red flags.  The patient continues with appropriate response to opioid therapy. ADL's remain intact by self.   --- Continue Horizant 600mg BID. No refill needed today.   --- Follow-up 3 months or sooner if needed     BIRGIT REPORT    As part of the patient's treatment plan, I am prescribing controlled substances. The patient has been made aware of appropriate use of such medications, including potential risk of somnolence, limited ability to drive and/or work safely, and the potential for dependence or overdose. It has also bee made clear that these medications are for use by this patient only, without concomitant use of alcohol or other substances unless prescribed.     Patient has completed prescribing agreement detailing terms of continued  prescribing of controlled substances, including monitoring BIRGIT reports, urine drug screening, and pill counts if necessary. The patient is aware that inappropriate use will results in cessation of prescribing such medications.    BIRGIT report has been reviewed and scanned into the patient's chart.    As the clinician, I personally reviewed the BIRGIT from 5/15/2020 .    History and physical exam exhibit continued safe and appropriate use of controlled substances.    EMR Dragon/Transcription disclaimer:   Much of this encounter note is an electronic transcription/translation of spoken language to printed text. The electronic translation of spoken language may permit erroneous, or at times, nonsensical words or phrases to be inadvertently transcribed; Although I have reviewed the note for such errors, some may still exist.

## 2020-06-01 DIAGNOSIS — I10 HYPERTENSION, BENIGN: ICD-10-CM

## 2020-06-02 RX ORDER — LOSARTAN POTASSIUM 100 MG/1
TABLET ORAL
Qty: 90 TABLET | Refills: 1 | Status: SHIPPED | OUTPATIENT
Start: 2020-06-02 | End: 2020-11-10

## 2020-06-05 NOTE — TELEPHONE ENCOUNTER
Medication Refill Request    Date of phone call: 20    Medication being requested: Horizant 600 mg   si tab po BID  Qty: 60    Date of last visit: 20    Date of last refill: 20    BIRGIT up to date?: yes    Next Follow up?: 20    Any new pertinent information? (i.e, new medication allergies, new use of medications, change in patient's health or condition, non-compliance or inconsistency with prescribing agreement?):

## 2020-06-16 DIAGNOSIS — G54.0 BRACHIAL PLEXUS NEUROPATHY: ICD-10-CM

## 2020-06-16 RX ORDER — HYDROCODONE BITARTRATE AND ACETAMINOPHEN 10; 325 MG/1; MG/1
1 TABLET ORAL EVERY 4 HOURS PRN
Qty: 90 TABLET | Refills: 0 | Status: SHIPPED | OUTPATIENT
Start: 2020-06-16 | End: 2020-11-20 | Stop reason: SDUPTHER

## 2020-06-16 NOTE — TELEPHONE ENCOUNTER
Medication Refill Request    Date of phone call: 20    Medication being requested: Norco  mg    si tab po q 4 hrs prn  Qty: 90    Date of last visit: 20    Date of last refill: 19    BIRGIT up to date?: yes    Next Follow up?: 20    Any new pertinent information? (i.e, new medication allergies, new use of medications, change in patient's health or condition, non-compliance or inconsistency with prescribing agreement?):

## 2020-06-17 ENCOUNTER — PRIOR AUTHORIZATION (OUTPATIENT)
Dept: PAIN MEDICINE | Facility: CLINIC | Age: 50
End: 2020-06-17

## 2020-06-17 NOTE — TELEPHONE ENCOUNTER
RANDEE GIBSON (Key: [id number]) - [id number]  HYDROcodone-Acetaminophen 10-325MG tablets  Status: PA Request  Created: June 17th, 2020  Sent: June 17th, 2020

## 2020-06-19 ENCOUNTER — LAB (OUTPATIENT)
Dept: INTERNAL MEDICINE | Facility: CLINIC | Age: 50
End: 2020-06-19

## 2020-06-19 DIAGNOSIS — I10 ESSENTIAL HYPERTENSION: ICD-10-CM

## 2020-06-19 DIAGNOSIS — Z12.5 PROSTATE CANCER SCREENING: ICD-10-CM

## 2020-06-19 DIAGNOSIS — Z87.39 HISTORY OF GOUT: ICD-10-CM

## 2020-06-19 DIAGNOSIS — R53.82 CHRONIC FATIGUE: ICD-10-CM

## 2020-06-19 DIAGNOSIS — E78.49 OTHER HYPERLIPIDEMIA: ICD-10-CM

## 2020-06-19 DIAGNOSIS — Z11.59 SCREENING FOR VIRAL DISEASE: ICD-10-CM

## 2020-06-19 DIAGNOSIS — E53.8 LOW VITAMIN B12 LEVEL: ICD-10-CM

## 2020-06-20 DIAGNOSIS — K21.9 GASTROESOPHAGEAL REFLUX DISEASE WITHOUT ESOPHAGITIS: ICD-10-CM

## 2020-06-20 LAB
ALBUMIN SERPL-MCNC: 4.6 G/DL (ref 3.5–5.2)
ALBUMIN/GLOB SERPL: 2 G/DL
ALP SERPL-CCNC: 67 U/L (ref 39–117)
ALT SERPL-CCNC: 15 U/L (ref 1–41)
APPEARANCE UR: CLEAR
AST SERPL-CCNC: 22 U/L (ref 1–40)
BACTERIA #/AREA URNS HPF: ABNORMAL /HPF
BASOPHILS # BLD AUTO: 0.07 10*3/MM3 (ref 0–0.2)
BASOPHILS NFR BLD AUTO: 1.3 % (ref 0–1.5)
BILIRUB SERPL-MCNC: 0.3 MG/DL (ref 0.2–1.2)
BILIRUB UR QL STRIP: NEGATIVE
BUN SERPL-MCNC: 19 MG/DL (ref 6–20)
BUN/CREAT SERPL: 12.8 (ref 7–25)
CALCIUM SERPL-MCNC: 9.9 MG/DL (ref 8.6–10.5)
CASTS URNS MICRO: ABNORMAL
CHLORIDE SERPL-SCNC: 103 MMOL/L (ref 98–107)
CHOLEST SERPL-MCNC: 208 MG/DL (ref 0–200)
CO2 SERPL-SCNC: 29 MMOL/L (ref 22–29)
COLOR UR: YELLOW
CREAT SERPL-MCNC: 1.48 MG/DL (ref 0.76–1.27)
EOSINOPHIL # BLD AUTO: 0.18 10*3/MM3 (ref 0–0.4)
EOSINOPHIL NFR BLD AUTO: 3.2 % (ref 0.3–6.2)
EPI CELLS #/AREA URNS HPF: ABNORMAL /HPF
ERYTHROCYTE [DISTWIDTH] IN BLOOD BY AUTOMATED COUNT: 12.6 % (ref 12.3–15.4)
FOLATE SERPL-MCNC: 15 NG/ML (ref 4.78–24.2)
GLOBULIN SER CALC-MCNC: 2.3 GM/DL
GLUCOSE SERPL-MCNC: 106 MG/DL (ref 65–99)
GLUCOSE UR QL: NEGATIVE
HCT VFR BLD AUTO: 40.8 % (ref 37.5–51)
HCV AB S/CO SERPL IA: <0.1 S/CO RATIO (ref 0–0.9)
HDLC SERPL-MCNC: 45 MG/DL (ref 40–60)
HGB BLD-MCNC: 13.9 G/DL (ref 13–17.7)
HGB UR QL STRIP: NEGATIVE
HIV 1+2 AB+HIV1 P24 AG SERPL QL IA: NON REACTIVE
IMM GRANULOCYTES # BLD AUTO: 0.02 10*3/MM3 (ref 0–0.05)
IMM GRANULOCYTES NFR BLD AUTO: 0.4 % (ref 0–0.5)
KETONES UR QL STRIP: NEGATIVE
LDLC SERPL CALC-MCNC: 131 MG/DL (ref 0–100)
LEUKOCYTE ESTERASE UR QL STRIP: ABNORMAL
LYMPHOCYTES # BLD AUTO: 1.35 10*3/MM3 (ref 0.7–3.1)
LYMPHOCYTES NFR BLD AUTO: 24.2 % (ref 19.6–45.3)
MCH RBC QN AUTO: 29.3 PG (ref 26.6–33)
MCHC RBC AUTO-ENTMCNC: 34.1 G/DL (ref 31.5–35.7)
MCV RBC AUTO: 86.1 FL (ref 79–97)
MONOCYTES # BLD AUTO: 0.47 10*3/MM3 (ref 0.1–0.9)
MONOCYTES NFR BLD AUTO: 8.4 % (ref 5–12)
NEUTROPHILS # BLD AUTO: 3.49 10*3/MM3 (ref 1.7–7)
NEUTROPHILS NFR BLD AUTO: 62.5 % (ref 42.7–76)
NITRITE UR QL STRIP: NEGATIVE
NRBC BLD AUTO-RTO: 0 /100 WBC (ref 0–0.2)
PH UR STRIP: 6 [PH] (ref 5–8)
PLATELET # BLD AUTO: 203 10*3/MM3 (ref 140–450)
POTASSIUM SERPL-SCNC: 4.6 MMOL/L (ref 3.5–5.2)
PROT SERPL-MCNC: 6.9 G/DL (ref 6–8.5)
PROT UR QL STRIP: NEGATIVE
PSA SERPL-MCNC: 1.64 NG/ML (ref 0–4)
RBC # BLD AUTO: 4.74 10*6/MM3 (ref 4.14–5.8)
RBC #/AREA URNS HPF: ABNORMAL /HPF
SODIUM SERPL-SCNC: 142 MMOL/L (ref 136–145)
SP GR UR: 1.02 (ref 1–1.03)
TRIGL SERPL-MCNC: 158 MG/DL (ref 0–150)
TSH SERPL DL<=0.005 MIU/L-ACNC: 2.33 UIU/ML (ref 0.27–4.2)
URATE SERPL-MCNC: 4.9 MG/DL (ref 3.4–7)
UROBILINOGEN UR STRIP-MCNC: ABNORMAL MG/DL
VIT B12 SERPL-MCNC: 591 PG/ML (ref 211–946)
VLDLC SERPL CALC-MCNC: 31.6 MG/DL
WBC # BLD AUTO: 5.58 10*3/MM3 (ref 3.4–10.8)
WBC #/AREA URNS HPF: ABNORMAL /HPF

## 2020-06-22 ENCOUNTER — TELEPHONE (OUTPATIENT)
Dept: INTERNAL MEDICINE | Facility: CLINIC | Age: 50
End: 2020-06-22

## 2020-06-22 RX ORDER — OMEPRAZOLE 40 MG/1
CAPSULE, DELAYED RELEASE ORAL
Qty: 90 CAPSULE | Refills: 1 | Status: SHIPPED | OUTPATIENT
Start: 2020-06-22 | End: 2020-12-15

## 2020-06-22 NOTE — TELEPHONE ENCOUNTER
PATIENT CALLED FOR LAB RESULTS  FOR HIV TEST.    PLEASE CALL [phone] OR MESSAGE VIA [email or web address]

## 2020-07-06 RX ORDER — GABAPENTIN ENACARBIL 600 MG/1
TABLET, EXTENDED RELEASE ORAL
Qty: 60 TABLET | Refills: 1 | Status: SHIPPED | OUTPATIENT
Start: 2020-07-06 | End: 2020-09-01

## 2020-07-06 NOTE — TELEPHONE ENCOUNTER
Reviewed UDS and BIRGIT. Both updated and appropriate. Refill appropriate.    Reviewed chart. Appropriate for refill. Since JDB is out of office, will refill. Thanks. [email or web address]

## 2020-08-14 DIAGNOSIS — I10 ESSENTIAL HYPERTENSION: ICD-10-CM

## 2020-08-14 RX ORDER — AMLODIPINE BESYLATE 5 MG/1
5 TABLET ORAL DAILY
Qty: 30 TABLET | Refills: 5 | Status: SHIPPED | OUTPATIENT
Start: 2020-08-14 | End: 2021-02-09

## 2020-08-18 ENCOUNTER — OFFICE VISIT (OUTPATIENT)
Dept: PAIN MEDICINE | Facility: CLINIC | Age: 50
End: 2020-08-18

## 2020-08-18 VITALS
HEART RATE: 78 BPM | BODY MASS INDEX: 37.98 KG/M2 | WEIGHT: 242 LBS | TEMPERATURE: 98.1 F | OXYGEN SATURATION: 97 % | DIASTOLIC BLOOD PRESSURE: 79 MMHG | RESPIRATION RATE: 16 BRPM | HEIGHT: 67 IN | SYSTOLIC BLOOD PRESSURE: 122 MMHG

## 2020-08-18 DIAGNOSIS — M54.50 CHRONIC BILATERAL LOW BACK PAIN WITHOUT SCIATICA: ICD-10-CM

## 2020-08-18 DIAGNOSIS — Q85.00 NEUROFIBROMATOSIS (HCC): ICD-10-CM

## 2020-08-18 DIAGNOSIS — Z79.899 ENCOUNTER FOR LONG-TERM (CURRENT) USE OF HIGH-RISK MEDICATION: ICD-10-CM

## 2020-08-18 DIAGNOSIS — G89.29 CHRONIC BILATERAL LOW BACK PAIN WITHOUT SCIATICA: ICD-10-CM

## 2020-08-18 DIAGNOSIS — G89.4 CHRONIC PAIN SYNDROME: Primary | ICD-10-CM

## 2020-08-18 PROCEDURE — 99213 OFFICE O/P EST LOW 20 MIN: CPT | Performed by: NURSE PRACTITIONER

## 2020-08-18 NOTE — PROGRESS NOTES
CHIEF COMPLAINT  F/u neck and arm pain- patient states that his pain has been stable since his last visit.     Subjective   Imsa De Luna is a 50 y.o. male  who presents for follow-up.  He has a history of back pain, neck pain.    Today his pain is 2/10VAAS in severity. He is currently taking hydrocodone 10/325 very sparingly.  He also continues with Horizant 600 mg BID, Flexeril PRN (2-3 times a month), Cymbalta 30 mg daily. Reports moderate pain reduction with regimen, denies any adverse reactions.       He states he continues to tolerate Horizant better then he tolerated Gabapentin.  He states he no longer has the somnolence issues which were previously interfering with work and driving.      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  Monitoring for now. Consideration for radiation therapy, neurosurgery does not recommend because of possible inflammation and scar tissue of the brachial plexus area.      Neck Pain    This is a chronic problem. The current episode started more than 1 month ago. The problem occurs constantly. The problem has been gradually improving. The pain is present in the midline and right side. The quality of the pain is described as aching, cramping and shooting. The pain is at a severity of 2/10. The symptoms are aggravated by twisting. Associated symptoms include numbness (bilat arms) and weakness (left arm). Pertinent negatives include no chest pain, fever or headaches (freq). He has tried acetaminophen, bed rest, heat, ice, NSAIDs, oral narcotics, muscle relaxants, neck support and home exercises (hydrocodone) for the symptoms. The treatment provided moderate relief.     PEG Assessment   What number best describes your pain on average in the past week?3  What number best describes how, during the past week, pain has interfered with your enjoyment of life?1  What number best describes how, during the past week, pain has interfered  "with your general activity?  0    The following portions of the patient's history were reviewed and updated as appropriate: allergies, current medications, past family history, past medical history, past social history, past surgical history and problem list.    Review of Systems   Constitutional: Positive for activity change (decreased). Negative for fatigue and fever.   HENT: Negative for congestion (allergies).    Eyes: Negative for visual disturbance.   Respiratory: Negative for cough, chest tightness, shortness of breath and wheezing.    Cardiovascular: Negative.  Negative for chest pain.   Gastrointestinal: Negative for abdominal pain, constipation and diarrhea.   Genitourinary: Negative for difficulty urinating and dysuria.   Musculoskeletal: Positive for arthralgias (left shoulder), neck pain and neck stiffness. Negative for back pain.   Allergic/Immunologic: Negative for immunocompromised state.   Neurological: Positive for weakness (left arm) and numbness (bilat arms). Negative for dizziness, light-headedness and headaches (freq).   Psychiatric/Behavioral: Negative for agitation, sleep disturbance and suicidal ideas. The patient is not nervous/anxious.      I have reviewed and confirmed the accuracy of the ROS as documented by the MA/LPN/RN EMILIE Shrestha    Vitals:    08/18/20 1254   BP: 122/79   Pulse: 78   Resp: 16   Temp: 98.1 °F (36.7 °C)   SpO2: 97%   Weight: 110 kg (242 lb)   Height: 170.2 cm (67\")   PainSc:   2   PainLoc: Neck     Objective   Physical Exam   Constitutional: He is oriented to person, place, and time. Vital signs are normal. He appears well-developed and well-nourished.  Non-toxic appearance. No distress.   HENT:   Head: Normocephalic and atraumatic.   Nose: Nose normal.   Eyes: Conjunctivae and lids are normal.   Cardiovascular: Normal rate.   Pulmonary/Chest: Effort normal. No respiratory distress.   Abdominal: Normal appearance.   Musculoskeletal:        Right shoulder: " He exhibits tenderness.        Left shoulder: He exhibits tenderness.        Cervical back: He exhibits tenderness.   Left neck surgical scar   Neurological: He is alert and oriented to person, place, and time. No cranial nerve deficit or sensory deficit. Gait normal.   Psychiatric: He has a normal mood and affect. His behavior is normal.   Nursing note and vitals reviewed.    Assessment/Plan   Isma was seen today for neck pain.    Diagnoses and all orders for this visit:    Chronic pain syndrome    Chronic bilateral low back pain without sciatica    Neurofibromatosis (CMS/HCC)    Encounter for long-term (current) use of high-risk medication      --- Continue Hydrocodone. Patient appears stable with current regimen. No adverse effects. Regarding continuation of opioids, there is no evidence of aberrant behavior or any red flags.  The patient continues with appropriate response to opioid therapy. ADL's remain intact by self.   --- The urine drug screen confirmation from 5/18/2020 has been reviewed and the result is appropriate based on patient history and BIRGIT report  --- Follow-up 3 months        BIRGIT REPORT  As part of the patient's treatment plan, I am prescribing controlled substances. The patient has been made aware of appropriate use of such medications, including potential risk of somnolence, limited ability to drive and/or work safely, and the potential for dependence or overdose. It has also bee made clear that these medications are for use by this patient only, without concomitant use of alcohol or other substances unless prescribed.     Patient has completed prescribing agreement detailing terms of continued prescribing of controlled substances, including monitoring BIRGIT reports, urine drug screening, and pill counts if necessary. The patient is aware that inappropriate use will results in cessation of prescribing such medications.    BIRGIT report has been reviewed and scanned into the patient's  chart.    As the clinician, I personally reviewed the BIRGIT from 8/18/2020 while the patient was in the office today.    History and physical exam exhibit continued safe and appropriate use of controlled substances.    EMR Dragon/Transcription disclaimer:   Much of this encounter note is an electronic transcription/translation of spoken language to printed text. The electronic translation of spoken language may permit erroneous, or at times, nonsensical words or phrases to be inadvertently transcribed; Although I have reviewed the note for such errors, some may still exist.

## 2020-09-01 RX ORDER — GABAPENTIN ENACARBIL 600 MG/1
TABLET, EXTENDED RELEASE ORAL
Qty: 60 TABLET | Refills: 1 | Status: SHIPPED | OUTPATIENT
Start: 2020-09-01 | End: 2020-10-27

## 2020-10-09 ENCOUNTER — OFFICE VISIT (OUTPATIENT)
Dept: INTERNAL MEDICINE | Facility: CLINIC | Age: 50
End: 2020-10-09

## 2020-10-09 VITALS
OXYGEN SATURATION: 95 % | WEIGHT: 238.2 LBS | HEART RATE: 93 BPM | SYSTOLIC BLOOD PRESSURE: 137 MMHG | RESPIRATION RATE: 16 BRPM | HEIGHT: 67 IN | TEMPERATURE: 98.1 F | DIASTOLIC BLOOD PRESSURE: 83 MMHG | BODY MASS INDEX: 37.39 KG/M2

## 2020-10-09 DIAGNOSIS — R53.83 FATIGUE, UNSPECIFIED TYPE: ICD-10-CM

## 2020-10-09 DIAGNOSIS — E78.49 OTHER HYPERLIPIDEMIA: ICD-10-CM

## 2020-10-09 DIAGNOSIS — F39 MOOD DISORDER (HCC): ICD-10-CM

## 2020-10-09 DIAGNOSIS — G89.4 CHRONIC PAIN SYNDROME: ICD-10-CM

## 2020-10-09 DIAGNOSIS — Z12.11 COLON CANCER SCREENING: ICD-10-CM

## 2020-10-09 DIAGNOSIS — I10 ESSENTIAL HYPERTENSION: ICD-10-CM

## 2020-10-09 DIAGNOSIS — Z00.00 ANNUAL PHYSICAL EXAM: Primary | ICD-10-CM

## 2020-10-09 DIAGNOSIS — E53.8 VITAMIN B12 DEFICIENCY: ICD-10-CM

## 2020-10-09 LAB
ALBUMIN SERPL-MCNC: 4.7 G/DL (ref 3.5–5.2)
ALBUMIN/GLOB SERPL: 2.6 G/DL
ALP SERPL-CCNC: 66 U/L (ref 39–117)
ALT SERPL-CCNC: 20 U/L (ref 1–41)
AST SERPL-CCNC: 23 U/L (ref 1–40)
BASOPHILS # BLD AUTO: 0.06 10*3/MM3 (ref 0–0.2)
BASOPHILS NFR BLD AUTO: 1.2 % (ref 0–1.5)
BILIRUB SERPL-MCNC: 0.5 MG/DL (ref 0–1.2)
BUN SERPL-MCNC: 16 MG/DL (ref 6–20)
BUN/CREAT SERPL: 12.2 (ref 7–25)
CALCIUM SERPL-MCNC: 9.2 MG/DL (ref 8.6–10.5)
CHLORIDE SERPL-SCNC: 107 MMOL/L (ref 98–107)
CHOLEST SERPL-MCNC: 190 MG/DL (ref 0–200)
CO2 SERPL-SCNC: 25.7 MMOL/L (ref 22–29)
CREAT SERPL-MCNC: 1.31 MG/DL (ref 0.76–1.27)
EOSINOPHIL # BLD AUTO: 0.08 10*3/MM3 (ref 0–0.4)
EOSINOPHIL NFR BLD AUTO: 1.5 % (ref 0.3–6.2)
ERYTHROCYTE [DISTWIDTH] IN BLOOD BY AUTOMATED COUNT: 13.1 % (ref 12.3–15.4)
FOLATE SERPL-MCNC: >20 NG/ML (ref 4.78–24.2)
GLOBULIN SER CALC-MCNC: 1.8 GM/DL
GLUCOSE SERPL-MCNC: 104 MG/DL (ref 65–99)
HCT VFR BLD AUTO: 41.3 % (ref 37.5–51)
HDLC SERPL-MCNC: 42 MG/DL (ref 40–60)
HGB BLD-MCNC: 13.7 G/DL (ref 13–17.7)
IMM GRANULOCYTES # BLD AUTO: 0.02 10*3/MM3 (ref 0–0.05)
IMM GRANULOCYTES NFR BLD AUTO: 0.4 % (ref 0–0.5)
LDLC SERPL CALC-MCNC: 117 MG/DL (ref 0–100)
LYMPHOCYTES # BLD AUTO: 1.26 10*3/MM3 (ref 0.7–3.1)
LYMPHOCYTES NFR BLD AUTO: 24.2 % (ref 19.6–45.3)
MCH RBC QN AUTO: 28.4 PG (ref 26.6–33)
MCHC RBC AUTO-ENTMCNC: 33.2 G/DL (ref 31.5–35.7)
MCV RBC AUTO: 85.7 FL (ref 79–97)
MONOCYTES # BLD AUTO: 0.41 10*3/MM3 (ref 0.1–0.9)
MONOCYTES NFR BLD AUTO: 7.9 % (ref 5–12)
NEUTROPHILS # BLD AUTO: 3.37 10*3/MM3 (ref 1.7–7)
NEUTROPHILS NFR BLD AUTO: 64.8 % (ref 42.7–76)
NRBC BLD AUTO-RTO: 0 /100 WBC (ref 0–0.2)
PLATELET # BLD AUTO: 218 10*3/MM3 (ref 140–450)
POTASSIUM SERPL-SCNC: 4.2 MMOL/L (ref 3.5–5.2)
PROT SERPL-MCNC: 6.5 G/DL (ref 6–8.5)
RBC # BLD AUTO: 4.82 10*6/MM3 (ref 4.14–5.8)
SODIUM SERPL-SCNC: 143 MMOL/L (ref 136–145)
TRIGL SERPL-MCNC: 178 MG/DL (ref 0–150)
TSH SERPL DL<=0.005 MIU/L-ACNC: 1.09 UIU/ML (ref 0.27–4.2)
VIT B12 SERPL-MCNC: 713 PG/ML (ref 211–946)
VLDLC SERPL CALC-MCNC: 31 MG/DL (ref 5–40)
WBC # BLD AUTO: 5.2 10*3/MM3 (ref 3.4–10.8)

## 2020-10-09 PROCEDURE — 99396 PREV VISIT EST AGE 40-64: CPT | Performed by: INTERNAL MEDICINE

## 2020-10-09 RX ORDER — DULOXETIN HYDROCHLORIDE 60 MG/1
CAPSULE, DELAYED RELEASE ORAL
COMMUNITY
Start: 2020-08-14

## 2020-10-09 NOTE — PROGRESS NOTES
"Subjective   Isma De Luna is a 50 y.o. male here for   Chief Complaint   Patient presents with   • Annual Exam   .    Vitals:    10/09/20 0827   BP: 137/83   BP Location: Right arm   Patient Position: Sitting   Cuff Size: Adult   Pulse: 93   Resp: 16   Temp: 98.1 °F (36.7 °C)   TempSrc: Oral   SpO2: 95%   Weight: 108 kg (238 lb 3.2 oz)   Height: 170.2 cm (67\")       Body mass index is 37.31 kg/m².    History of Present Illness     The following portions of the patient's history were reviewed and updated as appropriate: allergies, current medications, past social history and problem list.    Review of Systems   Constitutional: Positive for fatigue. Negative for appetite change, chills, fever and unexpected weight change.   HENT: Positive for congestion. Negative for ear pain, mouth sores, postnasal drip, sinus pain, sore throat, tinnitus, trouble swallowing and voice change.    Eyes: Negative for pain and visual disturbance.   Respiratory: Positive for cough (in am). Negative for choking, shortness of breath and wheezing.    Cardiovascular: Negative for chest pain, palpitations and leg swelling.   Gastrointestinal: Negative for abdominal pain, blood in stool, constipation, diarrhea, nausea and vomiting.   Endocrine: Negative for cold intolerance, heat intolerance, polydipsia and polyuria.   Genitourinary: Positive for enuresis (1x). Negative for difficulty urinating, dysuria, flank pain, frequency, hematuria, testicular pain and urgency.   Musculoskeletal: Positive for back pain (off & on). Negative for arthralgias, gait problem, joint swelling, myalgias, neck pain and neck stiffness.   Skin: Negative for color change and rash.   Allergic/Immunologic: Positive for environmental allergies. Negative for food allergies and immunocompromised state.   Neurological: Positive for numbness (both arms (b/c tumors & tendonitis)). Negative for dizziness, tremors, seizures, syncope, speech difficulty, weakness and headaches. "   Hematological: Negative for adenopathy. Does not bruise/bleed easily.   Psychiatric/Behavioral: Negative for agitation, confusion, decreased concentration, dysphoric mood, sleep disturbance and suicidal ideas. The patient is not nervous/anxious.        Objective   Physical Exam  Vitals signs reviewed.   Constitutional:       General: He is not in acute distress.     Appearance: He is well-developed.   HENT:      Head: Normocephalic and atraumatic.      Right Ear: External ear normal.      Left Ear: External ear normal.      Nose: Nose normal.   Eyes:      General: No scleral icterus.        Right eye: No discharge.         Left eye: No discharge.      Conjunctiva/sclera: Conjunctivae normal.      Pupils: Pupils are equal, round, and reactive to light.   Neck:      Musculoskeletal: Normal range of motion and neck supple.      Thyroid: No thyromegaly.      Vascular: No JVD.      Trachea: No tracheal deviation.   Cardiovascular:      Rate and Rhythm: Normal rate and regular rhythm.      Heart sounds: Normal heart sounds. No murmur. No friction rub. No gallop.    Pulmonary:      Effort: Pulmonary effort is normal. No respiratory distress.      Breath sounds: Normal breath sounds. No wheezing or rales.   Chest:      Chest wall: No tenderness.   Abdominal:      General: Bowel sounds are normal. There is no distension.      Palpations: Abdomen is soft. There is no mass.      Tenderness: There is no abdominal tenderness. There is no guarding or rebound.      Hernia: No hernia is present.   Genitourinary:     Penis: Normal and circumcised.    Musculoskeletal: Normal range of motion.   Lymphadenopathy:      Cervical: No cervical adenopathy.   Skin:     General: Skin is warm and dry.      Findings: No erythema or rash.   Neurological:      Mental Status: He is alert and oriented to person, place, and time.      Cranial Nerves: No cranial nerve deficit.      Motor: No abnormal muscle tone.      Coordination: Coordination  normal.      Deep Tendon Reflexes: Reflexes are normal and symmetric.   Psychiatric:         Behavior: Behavior normal.         Thought Content: Thought content normal.         Judgment: Judgment normal.         Assessment/Plan   Diagnoses and all orders for this visit:    Annual physical exam  -     CBC & Differential  -     Comprehensive Metabolic Panel  -     Lipid Panel    Other hyperlipidemia  Comments:  continue diet/ex  Orders:  -     Comprehensive Metabolic Panel  -     Lipid Panel    Essential hypertension  Comments:  controlled - call if bp over 130/80  Orders:  -     CBC & Differential  -     Comprehensive Metabolic Panel  -     Lipid Panel    Vitamin B12 deficiency  Comments:  continue daily B12  Orders:  -     Vitamin B12 & Folate    Chronic pain syndrome  Comments:  only needing hydrocodone 2-4x monthly - call if worse    Colon cancer screening  -     Ambulatory Referral to General Surgery    Mood disorder (CMS/HCC)  Comments:  controlled with cymbalta - will incr to 2/d if incr dep or anxiety  Orders:  -     DULoxetine (CYMBALTA) 60 MG capsule; TK 1 C PO D    Fatigue, unspecified type  Comments:  mild - call if worse  Orders:  -     TSH Rfx On Abnormal To Free T4     Discussed need to stay up to date on screening exams as indicated on sex, age & risk factors. I encouraged healthy weight maintenance with diet & exercise. Need good sleep habits & continue to avoid tobacco & excessive alcohol.     No rectal today b/c done by urologist recently. Colonoscopy to be done this year.

## 2020-10-27 RX ORDER — GABAPENTIN ENACARBIL 600 MG/1
TABLET, EXTENDED RELEASE ORAL
Qty: 60 TABLET | Refills: 1 | Status: SHIPPED | OUTPATIENT
Start: 2020-10-27 | End: 2020-12-22

## 2020-11-10 DIAGNOSIS — Z87.39 HISTORY OF GOUT: ICD-10-CM

## 2020-11-10 DIAGNOSIS — I10 HYPERTENSION, BENIGN: ICD-10-CM

## 2020-11-10 RX ORDER — FEBUXOSTAT 40 MG/1
TABLET, FILM COATED ORAL
Qty: 90 TABLET | Refills: 1 | Status: SHIPPED | OUTPATIENT
Start: 2020-11-10 | End: 2021-04-30

## 2020-11-10 RX ORDER — LOSARTAN POTASSIUM 100 MG/1
TABLET ORAL
Qty: 90 TABLET | Refills: 1 | Status: SHIPPED | OUTPATIENT
Start: 2020-11-10 | End: 2021-06-02 | Stop reason: SDUPTHER

## 2020-11-17 ENCOUNTER — TELEPHONE (OUTPATIENT)
Dept: INTERNAL MEDICINE | Facility: CLINIC | Age: 50
End: 2020-11-17

## 2020-11-17 NOTE — TELEPHONE ENCOUNTER
PT IS CALLING IN TO LEAVE A MESSAGE FOR DR MUÑOZ.  PT CALLED [facility] REGARDING HIS MEDICATION FOR     febuxostat (ULORIC) 40 MG tablet    WHICH NEEDS A PRIOR  AUTHORIZATION.  PHARMACY HAS CONTACTED THE OFFICE ABOUT THIS BUT THEY STILL HAVE NOT HEARD ANYTHING FROM THE OFFICE REGARDING THE PRIOR AUTH.  PT IS DOWN TO 5 DOSES OF THIS MEDICINE AND DOES NOT WANT TO RUN OUT.  PT WANTS TO BE CALLED BACK REGARDING THIS.    PT CALL BACK  [phone]  PHARMACY  [facility]  [address] [place] [address] LN  [phone]

## 2020-11-20 ENCOUNTER — OFFICE VISIT (OUTPATIENT)
Dept: PAIN MEDICINE | Facility: CLINIC | Age: 50
End: 2020-11-20

## 2020-11-20 VITALS
OXYGEN SATURATION: 96 % | WEIGHT: 243.7 LBS | SYSTOLIC BLOOD PRESSURE: 151 MMHG | DIASTOLIC BLOOD PRESSURE: 80 MMHG | RESPIRATION RATE: 18 BRPM | TEMPERATURE: 97.3 F | BODY MASS INDEX: 38.25 KG/M2 | HEART RATE: 101 BPM | HEIGHT: 67 IN

## 2020-11-20 DIAGNOSIS — M25.519 CHRONIC SHOULDER PAIN, UNSPECIFIED LATERALITY: ICD-10-CM

## 2020-11-20 DIAGNOSIS — G54.0 BRACHIAL PLEXUS NEUROPATHY: ICD-10-CM

## 2020-11-20 DIAGNOSIS — Z79.899 ENCOUNTER FOR LONG-TERM (CURRENT) USE OF HIGH-RISK MEDICATION: ICD-10-CM

## 2020-11-20 DIAGNOSIS — G89.4 CHRONIC PAIN SYNDROME: Primary | ICD-10-CM

## 2020-11-20 DIAGNOSIS — G89.29 CHRONIC SHOULDER PAIN, UNSPECIFIED LATERALITY: ICD-10-CM

## 2020-11-20 DIAGNOSIS — Q85.00 NEUROFIBROMATOSIS (HCC): ICD-10-CM

## 2020-11-20 PROCEDURE — 99214 OFFICE O/P EST MOD 30 MIN: CPT | Performed by: NURSE PRACTITIONER

## 2020-11-20 RX ORDER — METHYLPREDNISOLONE 4 MG/1
TABLET ORAL 2 TIMES DAILY
COMMUNITY
End: 2021-03-12 | Stop reason: ALTCHOICE

## 2020-11-20 RX ORDER — AMOXICILLIN AND CLAVULANATE POTASSIUM 875; 125 MG/1; MG/1
1 TABLET, FILM COATED ORAL 2 TIMES DAILY
COMMUNITY
End: 2021-03-12 | Stop reason: ALTCHOICE

## 2020-11-20 RX ORDER — HYDROCODONE BITARTRATE AND ACETAMINOPHEN 10; 325 MG/1; MG/1
1 TABLET ORAL EVERY 4 HOURS PRN
Qty: 90 TABLET | Refills: 0 | Status: SHIPPED | OUTPATIENT
Start: 2020-11-20 | End: 2021-04-16 | Stop reason: SDUPTHER

## 2020-11-20 NOTE — PROGRESS NOTES
CHIEF COMPLAINT  Follow-up for neck pain    Subjective   Isma De Luna is a 50 y.o. male  who presents for follow-up.  He has a history of neck pain, shoulder pain.    Today his pain is 2/10VAAS in severity. He is currently taking hydrocodone 10/325 very sparingly.  He also continues with Horizant 600 mg BID, Flexeril PRN (2-3 times a month), Cymbalta 30 mg daily. Reports moderate pain reduction with regimen, denies any adverse reactions.       He states he continues to tolerate Horizant better then he tolerated Gabapentin.  He states he no longer has the somnolence issues which were previously interfering with work and driving.      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  Monitoring for now. Consideration for radiation therapy, neurosurgery does not recommend because of possible inflammation and scar tissue of the brachial plexus area.      Patient remained masked during entire encounter. No cough present. I donned a mask and eye protection throughout entire visit. Prior to donning mask and eye protection, hand hygiene was performed, as well as when it was doffed.  I was closer than 6 feet, but not for an extended period of time. No obvious exposure to any bodily fluids.    Neck Pain   This is a chronic problem. The current episode started more than 1 month ago. The problem occurs constantly. The problem has been gradually improving. The pain is present in the midline and right side. The quality of the pain is described as aching, cramping and shooting. The pain is at a severity of 2/10. The symptoms are aggravated by twisting. Pertinent negatives include no chest pain, fever, headaches (freq), numbness or weakness. He has tried acetaminophen, bed rest, heat, ice, NSAIDs, oral narcotics, muscle relaxants, neck support and home exercises (hydrocodone) for the symptoms. The treatment provided moderate relief.     PEG Assessment   What number best  "describes your pain on average in the past week?2  What number best describes how, during the past week, pain has interfered with your enjoyment of life?1  What number best describes how, during the past week, pain has interfered with your general activity?  0    The following portions of the patient's history were reviewed and updated as appropriate: allergies, current medications, past family history, past medical history, past social history, past surgical history and problem list.    Review of Systems   Constitutional: Positive for fatigue. Negative for fever.   HENT: Negative for congestion.    Eyes: Negative for visual disturbance.   Respiratory: Negative for cough, shortness of breath and wheezing.    Cardiovascular: Negative.  Negative for chest pain.   Gastrointestinal: Negative for constipation and diarrhea.   Genitourinary: Negative for difficulty urinating.   Musculoskeletal: Positive for neck pain.   Neurological: Negative for weakness, numbness and headaches (freq).   Psychiatric/Behavioral: Positive for sleep disturbance. Negative for suicidal ideas. The patient is not nervous/anxious.      I have reviewed and confirmed the accuracy of the ROS as documented by the MA/LPN/RN EMILIE Shrestha    Vitals:    11/20/20 1403   BP: 151/80   Pulse: 101   Resp: 18   Temp: 97.3 °F (36.3 °C)   SpO2: 96%   Weight: 111 kg (243 lb 11.2 oz)   Height: 170.2 cm (67\")     Objective   Physical Exam  Vitals signs and nursing note reviewed.   Constitutional:       General: He is not in acute distress.     Appearance: Normal appearance. He is not ill-appearing.   HENT:      Head: Atraumatic.   Eyes:      Conjunctiva/sclera: Conjunctivae normal.   Cardiovascular:      Rate and Rhythm: Normal rate.   Pulmonary:      Effort: Pulmonary effort is normal. No respiratory distress.   Musculoskeletal:      Cervical back: He exhibits tenderness.      Lumbar back: He exhibits tenderness.   Skin:     General: Skin is warm and " dry.   Neurological:      Mental Status: He is alert and oriented to person, place, and time.      Motor: Motor function is intact.      Gait: Gait normal.   Psychiatric:         Mood and Affect: Mood normal.         Behavior: Behavior normal.       Assessment/Plan   Diagnoses and all orders for this visit:    1. Chronic pain syndrome (Primary)    2. Neurofibromatosis (CMS/HCC)    3. Chronic shoulder pain, unspecified laterality    4. Encounter for long-term (current) use of high-risk medication    5. Brachial plexus neuropathy  -     HYDROcodone-acetaminophen (NORCO)  MG per tablet; Take 1 tablet by mouth Every 4 (Four) Hours As Needed for Moderate Pain .  Dispense: 90 tablet; Refill: 0      --- Refill Hydrocodone. Patient appears stable with current regimen. No adverse effects. Regarding continuation of opioids, there is no evidence of aberrant behavior or any red flags.  The patient continues with appropriate response to opioid therapy. ADL's remain intact by self.   --- The urine drug screen confirmation from 5/18/2020 has been reviewed and the result is appropriate based on patient history and BIRGIT report  --- Follow-up 3 months/sooner if needed        BIRGIT REPORT  As part of the patient's treatment plan, I am prescribing controlled substances. The patient has been made aware of appropriate use of such medications, including potential risk of somnolence, limited ability to drive and/or work safely, and the potential for dependence or overdose. It has also bee made clear that these medications are for use by this patient only, without concomitant use of alcohol or other substances unless prescribed.     Patient has completed prescribing agreement detailing terms of continued prescribing of controlled substances, including monitoring BIRGIT reports, urine drug screening, and pill counts if necessary. The patient is aware that inappropriate use will results in cessation of prescribing such  medications.    BIRGIT report has been reviewed and scanned into the patient's chart.    As the clinician, I personally reviewed the BIRGIT from 11/20/2020 while the patient was in the office today.    History and physical exam exhibit continued safe and appropriate use of controlled substances.    EMR Dragon/Transcription disclaimer:   Much of this encounter note is an electronic transcription/translation of spoken language to printed text. The electronic translation of spoken language may permit erroneous, or at times, nonsensical words or phrases to be inadvertently transcribed; Although I have reviewed the note for such errors, some may still exist.

## 2020-12-14 DIAGNOSIS — K21.9 GASTROESOPHAGEAL REFLUX DISEASE WITHOUT ESOPHAGITIS: ICD-10-CM

## 2020-12-15 RX ORDER — OMEPRAZOLE 40 MG/1
CAPSULE, DELAYED RELEASE ORAL
Qty: 90 CAPSULE | Refills: 1 | Status: SHIPPED | OUTPATIENT
Start: 2020-12-15 | End: 2021-06-21

## 2020-12-22 RX ORDER — GABAPENTIN ENACARBIL 600 MG/1
TABLET, EXTENDED RELEASE ORAL
Qty: 60 TABLET | Refills: 1 | Status: SHIPPED | OUTPATIENT
Start: 2020-12-22 | End: 2021-02-19 | Stop reason: SDUPTHER

## 2021-02-09 DIAGNOSIS — I10 ESSENTIAL HYPERTENSION: ICD-10-CM

## 2021-02-09 RX ORDER — AMLODIPINE BESYLATE 5 MG/1
5 TABLET ORAL DAILY
Qty: 30 TABLET | Refills: 5 | Status: SHIPPED | OUTPATIENT
Start: 2021-02-09 | End: 2021-08-16

## 2021-02-19 ENCOUNTER — TELEMEDICINE (OUTPATIENT)
Dept: PAIN MEDICINE | Facility: CLINIC | Age: 51
End: 2021-02-19

## 2021-02-19 DIAGNOSIS — Z79.899 ENCOUNTER FOR LONG-TERM (CURRENT) USE OF HIGH-RISK MEDICATION: ICD-10-CM

## 2021-02-19 DIAGNOSIS — M79.2 NEUROPATHIC PAIN, ARM: ICD-10-CM

## 2021-02-19 DIAGNOSIS — G54.0: ICD-10-CM

## 2021-02-19 DIAGNOSIS — G89.29 CHRONIC SHOULDER PAIN, UNSPECIFIED LATERALITY: ICD-10-CM

## 2021-02-19 DIAGNOSIS — Q85.00 NEUROFIBROMATOSIS (HCC): ICD-10-CM

## 2021-02-19 DIAGNOSIS — M25.519 CHRONIC SHOULDER PAIN, UNSPECIFIED LATERALITY: ICD-10-CM

## 2021-02-19 DIAGNOSIS — G89.4 CHRONIC PAIN SYNDROME: Primary | ICD-10-CM

## 2021-02-19 PROCEDURE — 99214 OFFICE O/P EST MOD 30 MIN: CPT | Performed by: NURSE PRACTITIONER

## 2021-02-19 RX ORDER — GABAPENTIN ENACARBIL 600 MG/1
1 TABLET, EXTENDED RELEASE ORAL 2 TIMES DAILY
Qty: 60 TABLET | Refills: 5 | Status: SHIPPED | OUTPATIENT
Start: 2021-02-19 | End: 2021-08-13 | Stop reason: SDUPTHER

## 2021-02-19 NOTE — PROGRESS NOTES
TELEMEDICINE - VIDEO VISIT  You have chosen to receive care through a telehealth visit.  Do you consent to use a video/audio connection for your medical care today? Yes    CHIEF COMPLAINT  Neck pain, shoulder pain    Subjective   Isma De Luna is a 50 y.o. male  who presents for a video visit follow-up.He has a history of chronic pain of the neck and shoulder.    Today his pain is 2/10VAAS in severity. He is currently taking hydrocodone 10/325 very sparingly.  He also continues with Horizant 600 mg BID, Flexeril PRN (2-3 times a month), Cymbalta 30 mg daily. Reports moderate pain reduction with regimen, denies any adverse reactions.      He states he continues to tolerate Horizant better then he tolerated Gabapentin.  He states he no longer has the somnolence issues which were previously interfering with work and driving.     He states he has an occasional flare up in the right shoulder/axillary area where a tumor has been found. Monitoring for now. Recent consultation with neurosurgery at [facility] and radiation oncologist at [place].  Says tumors have increased in size.  Thinks he will have them removed early next year.  Concern is for brachial plexus injury.      Neck Pain   This is a chronic problem. The current episode started more than 1 month ago. The problem occurs constantly. The problem has been gradually improving. The pain is present in the midline and right side. The quality of the pain is described as aching, cramping and shooting. The pain is at a severity of 2/10. The symptoms are aggravated by twisting. Pertinent negatives include no chest pain, fever, headaches (freq), numbness or weakness. He has tried acetaminophen, bed rest, heat, ice, NSAIDs, oral narcotics, muscle relaxants, neck support and home exercises (hydrocodone) for the symptoms. The treatment provided moderate relief.   Arm Pain   The pain is present in the left hand, right hand, upper left arm and upper right arm. The quality of the  pain is described as aching (tingling). The pain is at a severity of 2/10. The pain is mild. The pain has been fluctuating since the incident. Pertinent negatives include no chest pain or numbness.     The following portions of the patient's history were reviewed and updated as appropriate: allergies, current medications, past family history, past medical history, past social history, past surgical history and problem list.    Review of Systems   Constitutional: Negative for fever.   Cardiovascular: Negative for chest pain.   Musculoskeletal: Positive for neck pain.   Neurological: Negative for weakness, numbness and headaches (freq).     Vitals:    02/19/21 1402   PainSc:   2   PainLoc: Arm     Objective   Physical Exam  Constitutional:       General: He is not in acute distress.     Appearance: Normal appearance.   Pulmonary:      Effort: No respiratory distress.   Neurological:      Mental Status: He is oriented to person, place, and time.   Psychiatric:         Mood and Affect: Mood normal.         Behavior: Behavior normal.       Assessment/Plan   Diagnoses and all orders for this visit:    1. Chronic pain syndrome (Primary)    2. Neurofibromatosis (CMS/HCC)    3. Neuropathic pain, arm    4. Chronic shoulder pain, unspecified laterality    5. Encounter for long-term (current) use of high-risk medication    6. Bilateral brachial plexus lesions    Other orders  -     Gabapentin Enacarbil ER (Horizant) 600 MG tablet controlled-release; Take 600 mg by mouth 2 (Two) Times a Day.  Dispense: 60 tablet; Refill: 5    ----------------  Our practice is offering alternative &/or electronic methods to continue to follow our patients while at the same time further the efforts toward social distancing, in accordance with our organizational policies, professional societies' guidance, and gubernatorial mandates.  I support the Healthy at Home campaign and in this visit I have counseled the patient on our needs to limit  in-person office visits and physical encounters with medical facilities whenever possible.  I have also educated the patient on the medical necessities of maintaining social distancing while we continue to function during this crisis period.      The patient was counseled on the need to consider telehealth options. The patient was offered the opportunity for a Video Visit using [employer]. The patient agreed to a Video Visit. The patient was counseled on the need for a telehealth visit for necessary monitoring. The patient was educated about our efforts to comply with monitoring standards when prescribing potent medications. During these pandemic conditions, telephone encounters are federally mandated as acceptable alternative to videoconference or in-person encounters to ensure that an individual can receive appropriate care and be monitored regardless of access to technology.   Coding & Billing for telehealth encounter is completed based on medical decision making concurrent with the 2021 E&M criteria, based on current federally mandated guidelines.    TIME:  Total Time:  14 minutes.  ----------------  --- Refill Horizant  --- Continue Hydrocodone. Patient appears stable with current regimen. No adverse effects. Regarding continuation of opioids, there is no evidence of aberrant behavior or any red flags.  The patient continues with appropriate response to opioid therapy. ADL's remain intact by self.   --- The urine drug screen confirmation from 5/18/2020 has been reviewed and the result is appropriate based on patient history and BIRGIT report.  WILL PLAN TO UPDATE NEXT VISIT.    --- Follow-up 3 months/sooner if needed        BIRGIT REPORT  As part of the patient's treatment plan, I am prescribing controlled substances. The patient has been made aware of appropriate use of such medications, including potential risk of somnolence, limited ability to drive and/or work safely, and the potential for dependence or  overdose. It has also bee made clear that these medications are for use by this patient only, without concomitant use of alcohol or other substances unless prescribed.     Patient has completed prescribing agreement detailing terms of continued prescribing of controlled substances, including monitoring BIRGIT reports, urine drug screening, and pill counts if necessary. The patient is aware that inappropriate use will results in cessation of prescribing such medications.    BIRGIT report has been reviewed and scanned into the patient's chart.    As the clinician, I personally reviewed the BIRGIT from 2/19/2021 while the patient was in the office today.    History and physical exam exhibit continued safe and appropriate use of controlled substances.    -------  EMR Dragon/Transcription disclaimer:   Much of this encounter note is an electronic transcription/translation of spoken language to printed text. The electronic translation of spoken language may permit erroneous, or at times, nonsensical words or phrases to be inadvertently transcribed; Although I have reviewed the note for such errors, some may still exist.

## 2021-03-04 ENCOUNTER — DOCUMENTATION (OUTPATIENT)
Dept: INTERNAL MEDICINE | Facility: CLINIC | Age: 51
End: 2021-03-04

## 2021-03-04 ENCOUNTER — TELEPHONE (OUTPATIENT)
Dept: INTERNAL MEDICINE | Facility: CLINIC | Age: 51
End: 2021-03-04

## 2021-03-04 NOTE — TELEPHONE ENCOUNTER
Caller: Isma De Luna    Relationship to patient: Self    Best call back number: [phone] (M)    Patient is needing: PATIENT CALLING BACK IN REGARDS TO WONTING TO RECEIVE A NOTE TO GET HIS COVID VACCINE PATIENT STATES HE HAS AN APPOINTMENT ON Monday MARCH 8 AND IS CHECKING THE STATUS OF HIS DR NOTE        PLEASE ADVISE

## 2021-03-12 ENCOUNTER — OFFICE VISIT (OUTPATIENT)
Dept: CARDIOLOGY | Facility: CLINIC | Age: 51
End: 2021-03-12

## 2021-03-12 VITALS
HEIGHT: 67 IN | HEART RATE: 77 BPM | BODY MASS INDEX: 38.3 KG/M2 | SYSTOLIC BLOOD PRESSURE: 130 MMHG | DIASTOLIC BLOOD PRESSURE: 82 MMHG | WEIGHT: 244 LBS

## 2021-03-12 DIAGNOSIS — I10 ESSENTIAL HYPERTENSION: Primary | ICD-10-CM

## 2021-03-12 PROCEDURE — 99214 OFFICE O/P EST MOD 30 MIN: CPT | Performed by: INTERNAL MEDICINE

## 2021-03-12 PROCEDURE — 93000 ELECTROCARDIOGRAM COMPLETE: CPT | Performed by: INTERNAL MEDICINE

## 2021-03-15 DIAGNOSIS — M79.2 NEUROPATHIC PAIN, ARM: ICD-10-CM

## 2021-03-15 RX ORDER — CYCLOBENZAPRINE HCL 10 MG
TABLET ORAL
Qty: 90 TABLET | Refills: 1 | Status: SHIPPED | OUTPATIENT
Start: 2021-03-15 | End: 2022-02-21

## 2021-03-15 NOTE — PROGRESS NOTES
"      CARDIOLOGY    Patricio Castanon MD    ENCOUNTER DATE:  03/12/2021    Isma De Luna / 51 y.o. 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 male who presents today for reevaluation.  Patient says he is doing great denies any type of chest pain shortness of breath palpitations lightheadedness or swelling.      The following portions of the patient's history were reviewed and updated as appropriate: allergies, current medications, past family history, past medical history, past social history, past surgical history and problem list.      VITAL SIGNS     Visit Vitals  /82 (BP Location: Left arm)   Pulse 77   Ht 170.2 cm (67\")   Wt 111 kg (244 lb)   BMI 38.22 kg/m²         Wt Readings from Last 3 Encounters:   03/12/21 111 kg (244 lb)   11/20/20 111 kg (243 lb 11.2 oz)   10/09/20 108 kg (238 lb 3.2 oz)     Body mass index is 38.22 kg/m².      REVIEW OF SYSTEMS   Review of Systems   All other systems reviewed and are negative.          PHYSICAL EXAMINATION     Vitals reviewed.   Constitutional:       Appearance: Healthy appearance.   Pulmonary:      Effort: Pulmonary effort is normal.      Breath sounds: Normal breath sounds.   Cardiovascular:      PMI at left midclavicular line. Normal rate. Regular rhythm. Normal S1. Normal S2.      Murmurs: There is no murmur.      No gallop. No click. No rub.   Pulses:     Intact distal pulses.   Edema:     Peripheral edema absent.           REVIEWED DATA       ECG 12 Lead    Date/Time: 3/12/2021 11:46 AM  Performed by: Patricio Castanon MD  Authorized by: Patricio Castanon MD   Comparison: compared with previous ECG from 9/10/2019  Similar to previous ECG  Rhythm: sinus rhythm  Conduction: right bundle branch block    Clinical impression: abnormal EKG            Cardiac Procedures:  1.     Lipid Panel    Lipid Panel 6/19/20 10/9/20 "   Total Cholesterol 208 (A) 190   Triglycerides 158 (A) 178 (A)   HDL Cholesterol 45 42   VLDL Cholesterol 31.6 31   LDL Cholesterol  131 (A) 117 (A)   (A) Abnormal value                ASSESSMENT & PLAN     No diagnosis found.      SUMMARY/DISCUSSION  1. Hypertension blood pressures great  2. 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  Last echo was about a year ago and clinically he remains stable.  At this point, I deferred echo until next year unless clinically something changes.  3. Follow-up 1 year.        MEDICATIONS         Discharge Medications          Accurate as of March 12, 2021 11:59 PM. If you have any questions, ask your nurse or doctor.            Continue These Medications      Instructions Start Date   amLODIPine 5 MG tablet  Commonly known as: NORVASC   5 mg, Oral, Daily      cyclobenzaprine 10 MG tablet  Commonly known as: FLEXERIL   TAKE 1 TABLET BY MOUTH THREE TIMES DAILY AS NEEDED FOR MUSCLE SPASMS      DULoxetine 60 MG capsule  Commonly known as: CYMBALTA   TK 1 C PO D      febuxostat 40 MG tablet  Commonly known as: ULORIC   TAKE 1 TABLET BY MOUTH DAILY INSTEAD OF ALLOPURINOL      fexofenadine 180 MG tablet  Commonly known as: ALLEGRA   180 mg, Oral, Daily      Flonase Sensimist 27.5 MCG/SPRAY nasal spray  Generic drug: fluticasone   2 sprays, Nasal, Daily      HM VITAMIN B100 COMPLEX PO   1 tablet, Oral, Daily      Horizant 600 MG tablet controlled-release  Generic drug: Gabapentin Enacarbil ER   600 mg, Oral, 2 Times Daily      HYDROcodone-acetaminophen  MG per tablet  Commonly known as: NORCO   1 tablet, Oral, Every 4 Hours PRN      Iron Supplement 325 (65 FE) MG tablet  Generic drug: ferrous sulfate   45 mg, Oral, Daily      losartan 100 MG tablet  Commonly known as: COZAAR   TAKE 1 TABLET BY MOUTH DAILY      multivitamin tablet tablet  Commonly known as: THERAGRAN   Oral, Daily      omeprazole 40 MG capsule  Commonly known as: priLOSEC   TAKE 1 CAPSULE BY MOUTH DAILY       Vitamin B-12 1000 MCG sublingual tablet   Sublingual      vitamin B-6 50 MG tablet  Commonly known as: PYRIDOXINE   100 mg, Oral, Daily      Vitamin D3 50 MCG (2000 UT) tablet   1,000 mg, Oral, Daily                 **Dragon Disclaimer:   Much of this encounter note is an electronic transcription/translation of spoken language to printed text. The electronic translation of spoken language may permit erroneous, or at times, nonsensical words or phrases to be inadvertently transcribed. Although I have reviewed the note for such errors, some may still exist.

## 2021-03-24 ENCOUNTER — BULK ORDERING (OUTPATIENT)
Dept: CASE MANAGEMENT | Facility: OTHER | Age: 51
End: 2021-03-24

## 2021-03-24 DIAGNOSIS — Z23 IMMUNIZATION DUE: ICD-10-CM

## 2021-04-16 ENCOUNTER — TELEPHONE (OUTPATIENT)
Dept: PAIN MEDICINE | Facility: CLINIC | Age: 51
End: 2021-04-16

## 2021-04-16 DIAGNOSIS — G54.0 BRACHIAL PLEXUS NEUROPATHY: ICD-10-CM

## 2021-04-16 RX ORDER — HYDROCODONE BITARTRATE AND ACETAMINOPHEN 10; 325 MG/1; MG/1
1 TABLET ORAL EVERY 4 HOURS PRN
Qty: 90 TABLET | Refills: 0 | Status: SHIPPED | OUTPATIENT
Start: 2021-04-16 | End: 2021-10-26 | Stop reason: SDUPTHER

## 2021-04-16 NOTE — TELEPHONE ENCOUNTER
Medication Refill Request    Date of phone call: 21    Medication being requested: norco 10/325mg si tab po q 4 hours prn   Qty: 90    Date of last visit: 21    Date of last refill:     BIRGIT up to date?:     Next Follow up?: 21    Any new pertinent information? (i.e, new medication allergies, new use of medications, change in patient's health or condition, non-compliance or inconsistency with prescribing agreement?):

## 2021-04-16 NOTE — TELEPHONE ENCOUNTER
Caller: MR GIBSON     Relationship: PATIENT     Best call back number: [phone]    Medication needed: HYDROcodone-acetaminophen (NORCO)  MG per tablet  Requested Prescriptions      No prescriptions requested or ordered in this encounter       When do you need the refill by:     What additional details did the patient provide when requesting the medication: N/A     Does the patient have less than a 3 day supply:  [] Yes  [x] No    What is the patient's preferred pharmacy:  [facility]

## 2021-04-23 ENCOUNTER — PREP FOR SURGERY (OUTPATIENT)
Dept: OTHER | Facility: HOSPITAL | Age: 51
End: 2021-04-23

## 2021-04-23 DIAGNOSIS — Z12.11 COLON CANCER SCREENING: Primary | ICD-10-CM

## 2021-04-29 DIAGNOSIS — Z87.39 HISTORY OF GOUT: ICD-10-CM

## 2021-04-30 RX ORDER — FEBUXOSTAT 40 MG/1
TABLET, FILM COATED ORAL
Qty: 90 TABLET | Refills: 1 | Status: SHIPPED | OUTPATIENT
Start: 2021-04-30 | End: 2021-08-13

## 2021-05-04 PROBLEM — Z12.11 COLON CANCER SCREENING: Status: ACTIVE | Noted: 2021-05-04

## 2021-05-12 ENCOUNTER — TELEPHONE (OUTPATIENT)
Dept: SURGERY | Facility: CLINIC | Age: 51
End: 2021-05-12

## 2021-05-14 ENCOUNTER — OFFICE VISIT (OUTPATIENT)
Dept: PAIN MEDICINE | Facility: CLINIC | Age: 51
End: 2021-05-14

## 2021-05-14 VITALS
DIASTOLIC BLOOD PRESSURE: 77 MMHG | WEIGHT: 248 LBS | BODY MASS INDEX: 38.92 KG/M2 | TEMPERATURE: 96.8 F | HEIGHT: 67 IN | SYSTOLIC BLOOD PRESSURE: 133 MMHG | RESPIRATION RATE: 16 BRPM | OXYGEN SATURATION: 98 % | HEART RATE: 80 BPM

## 2021-05-14 DIAGNOSIS — Q85.00 NEUROFIBROMATOSIS (HCC): ICD-10-CM

## 2021-05-14 DIAGNOSIS — G54.0: ICD-10-CM

## 2021-05-14 DIAGNOSIS — Z79.899 ENCOUNTER FOR LONG-TERM (CURRENT) USE OF HIGH-RISK MEDICATION: ICD-10-CM

## 2021-05-14 DIAGNOSIS — G89.4 CHRONIC PAIN SYNDROME: Primary | ICD-10-CM

## 2021-05-14 DIAGNOSIS — M79.2 NEUROPATHIC PAIN, ARM: ICD-10-CM

## 2021-05-14 PROCEDURE — 99214 OFFICE O/P EST MOD 30 MIN: CPT | Performed by: NURSE PRACTITIONER

## 2021-05-14 PROCEDURE — 80305 DRUG TEST PRSMV DIR OPT OBS: CPT | Performed by: NURSE PRACTITIONER

## 2021-05-14 NOTE — PROGRESS NOTES
CHIEF COMPLAINT  F/U neck and shoulder pain- patient states that his pain has remained the same since his last visit.     Subjective   Isma De Luna is a 51 y.o. male  who presents for follow-up.  He has a history of chronic arm/neck pain.    Today his pain is 1/10VAAS in severity. He is currently taking hydrocodone 10/325 very sparingly.  He also continues with Horizant 600 mg BID, Flexeril PRN (2-3 times a month), Cymbalta 30 mg daily. Reports moderate pain reduction with regimen, denies any adverse reactions.       He states he continues to tolerate Horizant better then he tolerated Gabapentin.  He states he no longer has the somnolence issues which were previously interfering with work and driving.      He states he has an occasional flare up in the right shoulder/axillary area where a tumor has been found. Monitoring for now. Recent consultation with neurosurgery at [facility] and radiation oncologist at [facility].  Says tumors have increased in size.  Thinks he will have them removed early next year.  Concern is for brachial plexus injury.  Plans to see neurosurgery specialist at [facility] for surgery.      Patient remained masked during entire encounter. No cough present. I donned a mask and eye protection throughout entire visit. Prior to donning mask and eye protection, hand hygiene was performed, as well as when it was doffed.  I was closer than 6 feet, but not for an extended period of time. No obvious exposure to any bodily fluids.    Neck Pain   This is a chronic problem. The current episode started more than 1 month ago. The problem occurs constantly. The problem has been gradually improving. The pain is present in the midline and right side. The quality of the pain is described as aching, cramping and shooting. The pain is at a severity of 1/10. The symptoms are aggravated by twisting. Associated symptoms include numbness and weakness. Pertinent negatives include no chest pain, fever or headaches. He  has tried acetaminophen, bed rest, heat, ice, NSAIDs, oral narcotics, muscle relaxants, neck support and home exercises (hydrocodone) for the symptoms. The treatment provided moderate relief.   Arm Pain   The pain is present in the left hand, right hand, upper left arm and upper right arm. The quality of the pain is described as aching (tingling). The pain is at a severity of 1/10. The pain is mild. The pain has been fluctuating since the incident. Associated symptoms include numbness. Pertinent negatives include no chest pain.      PEG Assessment   What number best describes your pain on average in the past week?3  What number best describes how, during the past week, pain has interfered with your enjoyment of life?1  What number best describes how, during the past week, pain has interfered with your general activity?  2    The following portions of the patient's history were reviewed and updated as appropriate: allergies, current medications, past family history, past medical history, past social history, past surgical history and problem list.    Review of Systems   Constitutional: Positive for activity change (decreased). Negative for chills, fatigue and fever.   HENT: Positive for congestion (r/t allergies).    Eyes: Negative for visual disturbance.   Respiratory: Negative for chest tightness and shortness of breath.    Cardiovascular: Negative for chest pain.   Gastrointestinal: Negative for abdominal pain, constipation and diarrhea.   Genitourinary: Negative for difficulty urinating and dysuria.   Musculoskeletal: Positive for neck pain.   Neurological: Positive for weakness and numbness. Negative for dizziness, light-headedness and headaches.   Psychiatric/Behavioral: Negative for agitation and sleep disturbance. The patient is not nervous/anxious.      I have reviewed and confirmed the accuracy of the ROS as documented by the MA/EMRE/RN EMILIE Shrestha    Vitals:    05/14/21 1406   BP: 133/77  "  Pulse: 80   Resp: 16   Temp: 96.8 °F (36 °C)   SpO2: 98%   Weight: 112 kg (248 lb)   Height: 170.2 cm (67\")   PainSc:   1   PainLoc: Neck  Comment: left     Objective   Physical Exam  Vitals and nursing note reviewed.   Constitutional:       General: He is not in acute distress.     Appearance: Normal appearance. He is not ill-appearing.   Pulmonary:      Effort: Pulmonary effort is normal. No respiratory distress.   Musculoskeletal:      Cervical back: Tenderness present.      Lumbar back: Tenderness present.   Skin:     General: Skin is warm and dry.   Neurological:      Mental Status: He is alert and oriented to person, place, and time.      Motor: Motor function is intact.      Gait: Gait normal.   Psychiatric:         Mood and Affect: Mood normal.         Behavior: Behavior normal.       Assessment/Plan   Diagnoses and all orders for this visit:    1. Chronic pain syndrome (Primary)  -     Urine Drug Screen Confirmation - Urine, Clean Catch; Future  -     POC Urine Drug Screen, Triage    2. Neuropathic pain, arm  -     Urine Drug Screen Confirmation - Urine, Clean Catch; Future  -     POC Urine Drug Screen, Triage    3. Neurofibromatosis (CMS/HCC)  -     Urine Drug Screen Confirmation - Urine, Clean Catch; Future  -     POC Urine Drug Screen, Triage    4. Bilateral brachial plexus lesions  -     Urine Drug Screen Confirmation - Urine, Clean Catch; Future  -     POC Urine Drug Screen, Triage    5. Encounter for long-term (current) use of high-risk medication  -     Urine Drug Screen Confirmation - Urine, Clean Catch; Future  -     POC Urine Drug Screen, Triage      --- Continue Gralise  --- Continue Hydrocodone. Patient appears stable with current regimen. No adverse effects. Regarding continuation of opioids, there is no evidence of aberrant behavior or any red flags.  The patient continues with appropriate response to opioid therapy. ADL's remain intact by self.   --- Routine UDS in office today as part of " monitoring requirements for controlled substances.  The specimen was viewed and the immunoassay result reviewed and is NEG.  This specimen will be sent to [facility] laboratory for confirmation.     --- Follow-up 3 months       BIRGIT REPORT  As part of the patient's treatment plan, I am prescribing controlled substances. The patient has been made aware of appropriate use of such medications, including potential risk of somnolence, limited ability to drive and/or work safely, and the potential for dependence or overdose. It has also bee made clear that these medications are for use by this patient only, without concomitant use of alcohol or other substances unless prescribed.     Patient has completed prescribing agreement detailing terms of continued prescribing of controlled substances, including monitoring BIRGIT reports, urine drug screening, and pill counts if necessary. The patient is aware that inappropriate use will results in cessation of prescribing such medications.    BIRGIT report has been reviewed and scanned into the patient's chart.    As the clinician, I personally reviewed the BIRGIT from 5/14/2021 while the patient was in the office today.    History and physical exam exhibit continued safe and appropriate use of controlled substances.    EMR Dragon/Transcription disclaimer:   Much of this encounter note is an electronic transcription/translation of spoken language to printed text. The electronic translation of spoken language may permit erroneous, or at times, nonsensical words or phrases to be inadvertently transcribed; Although I have reviewed the note for such errors, some may still exist.

## 2021-05-30 DIAGNOSIS — I10 HYPERTENSION, BENIGN: ICD-10-CM

## 2021-06-01 RX ORDER — LOSARTAN POTASSIUM 100 MG/1
TABLET ORAL
Qty: 90 TABLET | Refills: 1 | OUTPATIENT
Start: 2021-06-01

## 2021-06-02 DIAGNOSIS — I10 HYPERTENSION, BENIGN: ICD-10-CM

## 2021-06-02 NOTE — TELEPHONE ENCOUNTER
Caller: Isma De Luna    Relationship: Self    Best call back number: [phone]    Medication needed:   Requested Prescriptions     Pending Prescriptions Disp Refills   • losartan (COZAAR) 100 MG tablet 90 tablet 1     Sig: Take 1 tablet by mouth Daily.       When do you need the refill by: 06/02    What additional details did the patient provide when requesting the medication: PATIENT HAS 9 TABLETS AND WOULD LIKE TO RECEIVE A 90 DAY SUPPLY..    PATIENTS NEXT APPT IS June 25, 2021    Does the patient have less than a 3 day supply:  [] Yes  [x] No    What is the patient's preferred pharmacy: [id number] [address]  [address] [place] [address] - [phone]  - [phone] FX

## 2021-06-03 RX ORDER — LOSARTAN POTASSIUM 100 MG/1
100 TABLET ORAL DAILY
Qty: 90 TABLET | Refills: 0 | Status: SHIPPED | OUTPATIENT
Start: 2021-06-03 | End: 2021-08-31

## 2021-06-18 ENCOUNTER — ANESTHESIA (OUTPATIENT)
Dept: GASTROENTEROLOGY | Facility: HOSPITAL | Age: 51
End: 2021-06-18

## 2021-06-18 ENCOUNTER — ANESTHESIA EVENT (OUTPATIENT)
Dept: GASTROENTEROLOGY | Facility: HOSPITAL | Age: 51
End: 2021-06-18

## 2021-06-18 ENCOUNTER — HOSPITAL ENCOUNTER (OUTPATIENT)
Facility: HOSPITAL | Age: 51
Setting detail: HOSPITAL OUTPATIENT SURGERY
Discharge: HOME | End: 2021-06-18
Attending: SURGERY | Admitting: SURGERY

## 2021-06-18 VITALS
DIASTOLIC BLOOD PRESSURE: 61 MMHG | HEART RATE: 80 BPM | WEIGHT: 238 LBS | SYSTOLIC BLOOD PRESSURE: 91 MMHG | BODY MASS INDEX: 36.07 KG/M2 | HEIGHT: 68 IN | RESPIRATION RATE: 16 BRPM | TEMPERATURE: 98 F | OXYGEN SATURATION: 93 %

## 2021-06-18 DIAGNOSIS — Z12.11 COLON CANCER SCREENING: ICD-10-CM

## 2021-06-18 PROCEDURE — 25010000002 PROPOFOL 10 MG/ML EMULSION: Performed by: ANESTHESIOLOGY

## 2021-06-18 PROCEDURE — 88305 TISSUE EXAM BY PATHOLOGIST: CPT | Performed by: SURGERY

## 2021-06-18 PROCEDURE — S0260 H&P FOR SURGERY: HCPCS | Performed by: SURGERY

## 2021-06-18 PROCEDURE — 25010000002 PHENYLEPHRINE PER 1 ML: Performed by: ANESTHESIOLOGY

## 2021-06-18 PROCEDURE — 45385 COLONOSCOPY W/LESION REMOVAL: CPT | Performed by: SURGERY

## 2021-06-18 RX ORDER — PROPOFOL 10 MG/ML
VIAL (ML) INTRAVENOUS AS NEEDED
Status: DISCONTINUED | OUTPATIENT
Start: 2021-06-18 | End: 2021-06-18 | Stop reason: SURG

## 2021-06-18 RX ORDER — LIDOCAINE HYDROCHLORIDE 20 MG/ML
INJECTION, SOLUTION INFILTRATION; PERINEURAL AS NEEDED
Status: DISCONTINUED | OUTPATIENT
Start: 2021-06-18 | End: 2021-06-18 | Stop reason: SURG

## 2021-06-18 RX ORDER — PROPOFOL 10 MG/ML
VIAL (ML) INTRAVENOUS CONTINUOUS PRN
Status: DISCONTINUED | OUTPATIENT
Start: 2021-06-18 | End: 2021-06-18 | Stop reason: SURG

## 2021-06-18 RX ORDER — SODIUM CHLORIDE 9 MG/ML
30 INJECTION, SOLUTION INTRAVENOUS CONTINUOUS PRN
Status: DISCONTINUED | OUTPATIENT
Start: 2021-06-18 | End: 2021-06-18 | Stop reason: HOSPADM

## 2021-06-18 RX ADMIN — PHENYLEPHRINE HYDROCHLORIDE 100 MCG: 10 INJECTION INTRAVENOUS at 09:37

## 2021-06-18 RX ADMIN — PROPOFOL 250 MCG/KG/MIN: 10 INJECTION, EMULSION INTRAVENOUS at 09:27

## 2021-06-18 RX ADMIN — PHENYLEPHRINE HYDROCHLORIDE 200 MCG: 10 INJECTION INTRAVENOUS at 09:47

## 2021-06-18 RX ADMIN — PROPOFOL 200 MG: 10 INJECTION, EMULSION INTRAVENOUS at 09:27

## 2021-06-18 RX ADMIN — PHENYLEPHRINE HYDROCHLORIDE 200 MCG: 10 INJECTION INTRAVENOUS at 09:41

## 2021-06-18 RX ADMIN — SODIUM CHLORIDE 30 ML/HR: 9 INJECTION, SOLUTION INTRAVENOUS at 09:20

## 2021-06-18 RX ADMIN — LIDOCAINE HYDROCHLORIDE 60 MG: 20 INJECTION, SOLUTION INFILTRATION; PERINEURAL at 09:27

## 2021-06-18 NOTE — ANESTHESIA PREPROCEDURE EVALUATION
Anesthesia Evaluation     Patient summary reviewed and Nursing notes reviewed                Airway   Mallampati: II  TM distance: >3 FB  Neck ROM: full  No difficulty expected  Dental - normal exam     Pulmonary - normal exam   Cardiovascular - normal exam    (+) hypertension 2 medications or greater, hyperlipidemia,       Neuro/Psych  (+) numbness,       ROS Comment: Bilateral CTS/neurofibromatosis  GI/Hepatic/Renal/Endo    (+) obesity, morbid obesity,  renal disease CRI,     Musculoskeletal     (+) back pain, chronic pain, neck pain,   Abdominal   (+) obese,    Substance History      OB/GYN          Other      history of cancer      Other Comment: Hx renal CA/gastric CA                Anesthesia Plan    ASA 3     MAC     intravenous induction     Anesthetic plan, all risks, benefits, and alternatives have been provided, discussed and informed consent has been obtained with: patient.

## 2021-06-18 NOTE — ANESTHESIA POSTPROCEDURE EVALUATION
Patient: Isma De Luna    Procedure Summary     Date: 06/18/21 Room / Location:  [facility]    Anesthesia Start: 0924 Anesthesia Stop: 0958    Procedure: COLONOSCOPY WITH POLYPECTOMY (HOT SNARE) (N/A ) Diagnosis:       Colon cancer screening      (Colon cancer screening [Z12.11])    Surgeons: Octavio Novak Jr., MD Provider: Rip William MD    Anesthesia Type: MAC ASA Status: 3          Anesthesia Type: MAC    Vitals  Vitals Value Taken Time   BP 91/61 06/18/21 1017   Temp     Pulse 80 06/18/21 1017   Resp 16 06/18/21 1017   SpO2 93 % 06/18/21 1017           Post Anesthesia Care and Evaluation    Patient location during evaluation: PHASE II  Patient participation: complete - patient participated  Level of consciousness: awake and alert  Pain management: adequate  Airway patency: patent  Anesthetic complications: No anesthetic complications  PONV Status: none  Cardiovascular status: acceptable, hemodynamically stable and blood pressure returned to baseline  Respiratory status: acceptable, nonlabored ventilation and spontaneous ventilation  Hydration status: acceptable    Comments: Hypotension in PACU treated with IVF and meds

## 2021-06-18 NOTE — H&P
[facility]   HISTORY AND PHYSICAL    Patient Name: Isma De Luna  : 1970  MRN: [id number]  Primary Care Physician:  Bonnie Benoit MD  Date of admission: 2021    Subjective   Subjective     Chief Complaint: Need for screening colonoscopy    History of Present Illness     The patient is a very pleasant 51-year-old male who presents for screening colonoscopy.  He has never had a previous colonoscopy.  He has no significant GI symptoms.    Review of Systems   Constitutional: Negative for fatigue and fever.   Respiratory: Negative for chest tightness and shortness of breath.    Cardiovascular: Negative for chest pain and palpitations.   Gastrointestinal: Negative for abdominal pain, blood in stool, constipation, diarrhea, nausea and vomiting.        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   • PONV (postoperative nausea and vomiting)    • Renal cancer (CMS/HCC)    • Schwannoma    • Tenosynovitis, de Quervain     Left Wrist   • Vitamin B12 deficiency        Past Surgical History:   Procedure Laterality Date   • CARPAL TUNNEL RELEASE Left 2018   • CHOLECYSTECTOMY     • HYDROCELECTOMY Left 19   • INGUINAL HERNIA REPAIR Right    • LYMPH NODE BIOPSY      Dr George   • NEPHRECTOMY Left    • NEPHRECTOMY PARTIAL Right    • NEUROFIBROMA EXCISION Left 2018    left side of neck   • SPINE SURGERY  2010    T12-L1 tumors removed       Family History: family history includes Cancer in his father; Diabetes in his paternal grandmother; Heart disease in his paternal grandmother; Kidney cancer in his brother; Ovarian cancer in his mother; Stroke in his paternal grandmother; Uterine cancer in his mother. Otherwise  pertinent FHx was reviewed and not pertinent to current issue.    Social History:  reports that he has never smoked. He has never used smokeless tobacco. He reports current alcohol use. He reports current drug use. Drugs: Hydrocodone and Other.    Home Medications:  B Complex-Biotin-FA, DULoxetine, Gabapentin Enacarbil ER, HYDROcodone-acetaminophen, Vitamin B-12, Vitamin D3, amLODIPine, cyclobenzaprine, febuxostat, ferrous sulfate, fexofenadine, fluticasone, losartan, multivitamin, omeprazole, and vitamin B-6    Allergies:  Allergies   Allergen Reactions   • Molds & Smuts Other (See Comments)     Congestion, sneezing   • Nsaids Other (See Comments)     HX: Kidney CA; radical left nephrectomy, partial right.   • Pollen Extract Other (See Comments)     Other reaction(s): Other (See Comments)  Pollen, rag weed   Other reaction(s): Other (See Comments)  Pollen, rag weed        Objective    Objective     Vitals:   Temp:  [98 °F (36.7 °C)] 98 °F (36.7 °C)  Heart Rate:  [92] 92  Resp:  [16] 16  BP: (131)/(80) 131/80    Physical Exam  Constitutional:       Appearance: Normal appearance. He is well-developed. He is not toxic-appearing.   Eyes:      General: No scleral icterus.  Pulmonary:      Effort: Pulmonary effort is normal. No respiratory distress.   Abdominal:      Palpations: Abdomen is soft.      Tenderness: There is no abdominal tenderness.   Skin:     General: Skin is warm and dry.   Neurological:      Mental Status: He is alert and oriented to person, place, and time.   Psychiatric:         Behavior: Behavior normal.         Thought Content: Thought content normal.         Judgment: Judgment normal.         Assessment/Plan   Assessment / Plan     Brief Patient Summary:  Isma De Luna is a 51 y.o. male who presents for screening colonoscopy.    Active Hospital Problems:  Active Hospital Problems    Diagnosis    • **Colon cancer screening      Added automatically from request for surgery [id number]       Plan:  Colonoscopy.  The patient understands the indications, alternatives, risks, and benefits of the procedure and wishes to proceed.      Electronically signed by Octavio Novak Jr., MD, 06/18/21, 9:25 AM EDT.

## 2021-06-18 NOTE — OP NOTE
Surgeon:     Octavio Novak Jr., M.D.    Preoperative Diagnosis:     Need for screening colonoscopy    Postoperative Diagnosis:     Colon polyp x2    Procedure Performed:     Colonoscopy with hot snare polypectomy    Indications:     The patient is a pleasant 51-year-old male who presents for screening colonoscopy.  The patient understands the indications, alternatives, risks, and benefits of the procedure and wishes to proceed.    Procedure:     The patient was identified, taken to the endoscopy suite, and place in the left side down decubitus procedure.  The patient underwent a MAC anesthesia and was appropriately monitored through the case by the anesthesia personnel.  A rectal exam was performed that was normal.  The colonoscope was introduced into the rectum and advanced very carefully to the cecum that was identified by the cecal strap, ileocecal valve, and the appendiceal orifice.  The scope was then slowly withdrawn with careful circumferential examination of the mucosa performed.  The bowel prep was good allowing adequate visualization of mucosal surfaces.  The scope was withdrawn.  The patient tolerated the procedure well and was transferred the recovery area in stable condition.    Findings:    There were 2 separate 8 mm polyps, one in the proximal transverse colon and 1 in the rectum.  Both were removed by hot snare polypectomy and retrieved.    Recommendations:     1.  Await pathology results from the polypectomies.  2.  Repeat colonoscopy in 5 years.    Octavio Novak Jr., M.D.

## 2021-06-18 NOTE — ANESTHESIA POSTPROCEDURE EVALUATION
Patient: Isma De Luna    Procedure Summary     Date: 06/18/21 Room / Location:  [facility]    Anesthesia Start: 0924 Anesthesia Stop: 0958    Procedure: COLONOSCOPY WITH POLYPECTOMY (HOT SNARE) (N/A ) Diagnosis:       Colon cancer screening      (Colon cancer screening [Z12.11])    Surgeons: Octavio Novak Jr., MD Provider: Rip William MD    Anesthesia Type: MAC ASA Status: 3          Anesthesia Type: MAC    Vitals  No vitals data found for the desired time range.          Post Anesthesia Care and Evaluation    Patient location during evaluation: PHASE II  Patient participation: complete - patient participated  Level of consciousness: awake and alert  Pain management: adequate  Airway patency: patent  Anesthetic complications: No anesthetic complications  PONV Status: none  Cardiovascular status: acceptable and hemodynamically stable  Respiratory status: acceptable, nonlabored ventilation and spontaneous ventilation  Hydration status: acceptable

## 2021-06-21 DIAGNOSIS — K21.9 GASTROESOPHAGEAL REFLUX DISEASE WITHOUT ESOPHAGITIS: ICD-10-CM

## 2021-06-21 LAB
CYTO UR: NORMAL
LAB AP CASE REPORT: NORMAL
PATH REPORT.FINAL DX SPEC: NORMAL
PATH REPORT.GROSS SPEC: NORMAL

## 2021-06-21 RX ORDER — OMEPRAZOLE 40 MG/1
CAPSULE, DELAYED RELEASE ORAL
Qty: 90 CAPSULE | Refills: 0 | Status: SHIPPED | OUTPATIENT
Start: 2021-06-21 | End: 2021-09-27

## 2021-06-23 ENCOUNTER — TELEPHONE (OUTPATIENT)
Dept: SURGERY | Facility: CLINIC | Age: 51
End: 2021-06-23

## 2021-06-23 NOTE — TELEPHONE ENCOUNTER
Patient made aware of benign results and need for repeat colonoscopy in 5 years. Pt. Has been placed in recall.   Voiced understanding.

## 2021-06-23 NOTE — TELEPHONE ENCOUNTER
----- Message from Octavio Novak Jr., MD sent at 6/22/2021 11:05 AM EDT -----  Please contact this patient to inform that the polyps are benign and a repeat colonoscopy is needed in 5 years.  Please place in recall for a colonoscopy in 5 years.  Thanks.

## 2021-06-25 ENCOUNTER — OFFICE VISIT (OUTPATIENT)
Dept: INTERNAL MEDICINE | Facility: CLINIC | Age: 51
End: 2021-06-25

## 2021-06-25 VITALS
RESPIRATION RATE: 18 BRPM | HEIGHT: 68 IN | DIASTOLIC BLOOD PRESSURE: 82 MMHG | HEART RATE: 82 BPM | BODY MASS INDEX: 37.59 KG/M2 | SYSTOLIC BLOOD PRESSURE: 132 MMHG | WEIGHT: 248 LBS | OXYGEN SATURATION: 97 % | TEMPERATURE: 98.6 F

## 2021-06-25 DIAGNOSIS — I10 ESSENTIAL HYPERTENSION: Primary | ICD-10-CM

## 2021-06-25 DIAGNOSIS — E53.8 VITAMIN B12 DEFICIENCY: ICD-10-CM

## 2021-06-25 DIAGNOSIS — R73.09 ELEVATED GLUCOSE: ICD-10-CM

## 2021-06-25 DIAGNOSIS — E78.49 OTHER HYPERLIPIDEMIA: ICD-10-CM

## 2021-06-25 LAB
ALBUMIN SERPL-MCNC: 4.8 G/DL (ref 3.5–5.2)
ALBUMIN/GLOB SERPL: 2.3 G/DL
ALP SERPL-CCNC: 80 U/L (ref 39–117)
ALT SERPL-CCNC: 22 U/L (ref 1–41)
AST SERPL-CCNC: 24 U/L (ref 1–40)
BASOPHILS # BLD AUTO: 0.07 10*3/MM3 (ref 0–0.2)
BASOPHILS NFR BLD AUTO: 1.1 % (ref 0–1.5)
BILIRUB SERPL-MCNC: 0.4 MG/DL (ref 0–1.2)
BUN SERPL-MCNC: 17 MG/DL (ref 6–20)
BUN/CREAT SERPL: 12.4 (ref 7–25)
CALCIUM SERPL-MCNC: 9.9 MG/DL (ref 8.6–10.5)
CHLORIDE SERPL-SCNC: 102 MMOL/L (ref 98–107)
CHOLEST SERPL-MCNC: 181 MG/DL (ref 0–200)
CO2 SERPL-SCNC: 26.7 MMOL/L (ref 22–29)
CREAT SERPL-MCNC: 1.37 MG/DL (ref 0.76–1.27)
EOSINOPHIL # BLD AUTO: 0.1 10*3/MM3 (ref 0–0.4)
EOSINOPHIL NFR BLD AUTO: 1.6 % (ref 0.3–6.2)
ERYTHROCYTE [DISTWIDTH] IN BLOOD BY AUTOMATED COUNT: 12.6 % (ref 12.3–15.4)
FOLATE SERPL-MCNC: >20 NG/ML (ref 4.78–24.2)
GLOBULIN SER CALC-MCNC: 2.1 GM/DL
GLUCOSE SERPL-MCNC: 97 MG/DL (ref 65–99)
HCT VFR BLD AUTO: 40.7 % (ref 37.5–51)
HDLC SERPL-MCNC: 39 MG/DL (ref 40–60)
HGB BLD-MCNC: 13.6 G/DL (ref 13–17.7)
IMM GRANULOCYTES # BLD AUTO: 0.02 10*3/MM3 (ref 0–0.05)
IMM GRANULOCYTES NFR BLD AUTO: 0.3 % (ref 0–0.5)
LDLC SERPL CALC-MCNC: 118 MG/DL (ref 0–100)
LYMPHOCYTES # BLD AUTO: 1.72 10*3/MM3 (ref 0.7–3.1)
LYMPHOCYTES NFR BLD AUTO: 28.2 % (ref 19.6–45.3)
MCH RBC QN AUTO: 28.3 PG (ref 26.6–33)
MCHC RBC AUTO-ENTMCNC: 33.4 G/DL (ref 31.5–35.7)
MCV RBC AUTO: 84.8 FL (ref 79–97)
MONOCYTES # BLD AUTO: 0.44 10*3/MM3 (ref 0.1–0.9)
MONOCYTES NFR BLD AUTO: 7.2 % (ref 5–12)
NEUTROPHILS # BLD AUTO: 3.75 10*3/MM3 (ref 1.7–7)
NEUTROPHILS NFR BLD AUTO: 61.6 % (ref 42.7–76)
NRBC BLD AUTO-RTO: 0.2 /100 WBC (ref 0–0.2)
PLATELET # BLD AUTO: 241 10*3/MM3 (ref 140–450)
POTASSIUM SERPL-SCNC: 4.2 MMOL/L (ref 3.5–5.2)
PROT SERPL-MCNC: 6.9 G/DL (ref 6–8.5)
RBC # BLD AUTO: 4.8 10*6/MM3 (ref 4.14–5.8)
SODIUM SERPL-SCNC: 139 MMOL/L (ref 136–145)
TRIGL SERPL-MCNC: 136 MG/DL (ref 0–150)
VIT B12 SERPL-MCNC: 693 PG/ML (ref 211–946)
VLDLC SERPL CALC-MCNC: 24 MG/DL (ref 5–40)
WBC # BLD AUTO: 6.1 10*3/MM3 (ref 3.4–10.8)

## 2021-06-25 PROCEDURE — 99213 OFFICE O/P EST LOW 20 MIN: CPT | Performed by: INTERNAL MEDICINE

## 2021-06-25 RX ORDER — CETIRIZINE HYDROCHLORIDE 10 MG/1
TABLET ORAL
COMMUNITY
Start: 2021-06-01

## 2021-06-25 NOTE — PROGRESS NOTES
"Chief Complaint  Hypertension (8 month f/u.) and Hyperlipidemia    Subjective          Isma De Luna presents to [facility] for   Hypertension  This is a chronic problem. The current episode started more than 1 year ago. The problem is unchanged. The problem is controlled.   Hyperlipidemia  This is a chronic problem. The current episode started more than 1 year ago. The problem is controlled. Recent lipid tests were reviewed and are normal.       Review of Systems     Objective   Vital Signs:   /82   Pulse 82   Temp 98.6 °F (37 °C)   Resp 18   Ht 172.7 cm (68\")   Wt 112 kg (248 lb)   SpO2 97%   BMI 37.71 kg/m²     Physical Exam  Vitals and nursing note reviewed.   Constitutional:       General: He is not in acute distress.     Appearance: He is well-developed.   Cardiovascular:      Rate and Rhythm: Normal rate and regular rhythm.      Heart sounds: Normal heart sounds.   Pulmonary:      Effort: No respiratory distress.      Breath sounds: No wheezing or rales.   Chest:      Chest wall: No tenderness.   Psychiatric:         Behavior: Behavior normal.        Result Review :                 Assessment and Plan    Problem List Items Addressed This Visit        Unprioritized    Hypertension - Primary    Current Assessment & Plan     Hypertension is unchanged.  Continue current treatment regimen.  Dietary sodium restriction.  Weight loss.  Regular aerobic exercise.  Blood pressure will be reassessed at the next regular appointment.         Relevant Orders    Lipid Panel    Comprehensive Metabolic Panel    CBC & Differential    Hyperlipidemia    Current Assessment & Plan     Lipid abnormalities are unchanged.  Nutritional counseling was provided.  Lipids will be reassessed in 6 months.         Relevant Orders    Lipid Panel    Comprehensive Metabolic Panel    Vitamin B12 deficiency    Overview     Normal MMA 2013         Relevant Orders    Vitamin B12 & Folate    Elevated glucose "    Current Assessment & Plan     Need low sugar/carb diet & daily exercise               Follow Up   No follow-ups on file.  Patient was given instructions and counseling regarding his condition or for health maintenance advice. Please see specific information pulled into the AVS if appropriate.

## 2021-06-29 NOTE — PROGRESS NOTES
High cholesterol - need low fat diet & exercise. Abnormal kidney test - need to increase water intake & avoid ibuprofen, etc.  Other labs normal.

## 2021-08-10 ENCOUNTER — PRIOR AUTHORIZATION (OUTPATIENT)
Dept: INTERNAL MEDICINE | Facility: CLINIC | Age: 51
End: 2021-08-10

## 2021-08-11 DIAGNOSIS — Z87.39 HISTORY OF GOUT: ICD-10-CM

## 2021-08-13 ENCOUNTER — OFFICE VISIT (OUTPATIENT)
Dept: PAIN MEDICINE | Facility: CLINIC | Age: 51
End: 2021-08-13

## 2021-08-13 VITALS
RESPIRATION RATE: 18 BRPM | DIASTOLIC BLOOD PRESSURE: 92 MMHG | TEMPERATURE: 97.7 F | BODY MASS INDEX: 37.68 KG/M2 | HEIGHT: 68 IN | WEIGHT: 248.6 LBS | SYSTOLIC BLOOD PRESSURE: 148 MMHG | HEART RATE: 75 BPM | OXYGEN SATURATION: 95 %

## 2021-08-13 DIAGNOSIS — Z79.899 ENCOUNTER FOR LONG-TERM (CURRENT) USE OF HIGH-RISK MEDICATION: ICD-10-CM

## 2021-08-13 DIAGNOSIS — Q85.00 NEUROFIBROMATOSIS (HCC): ICD-10-CM

## 2021-08-13 DIAGNOSIS — G89.4 CHRONIC PAIN SYNDROME: Primary | ICD-10-CM

## 2021-08-13 DIAGNOSIS — G54.0 BRACHIAL PLEXUS NEUROPATHY: ICD-10-CM

## 2021-08-13 DIAGNOSIS — M79.2 NEUROPATHIC PAIN, ARM: ICD-10-CM

## 2021-08-13 PROCEDURE — 99213 OFFICE O/P EST LOW 20 MIN: CPT | Performed by: NURSE PRACTITIONER

## 2021-08-13 RX ORDER — GABAPENTIN ENACARBIL 600 MG/1
1 TABLET, EXTENDED RELEASE ORAL 2 TIMES DAILY
Qty: 60 TABLET | Refills: 5 | Status: SHIPPED | OUTPATIENT
Start: 2021-08-13 | End: 2022-02-23

## 2021-08-13 RX ORDER — FEBUXOSTAT 40 MG/1
TABLET, FILM COATED ORAL
Qty: 90 TABLET | Refills: 1 | Status: SHIPPED | OUTPATIENT
Start: 2021-08-13 | End: 2022-04-20

## 2021-08-13 NOTE — PROGRESS NOTES
CHIEF COMPLAINT  Follow-up for neck pain.    Subjective   Isma De Luna is a 51 y.o. male  who presents for follow-up.  He has a history of chronic pain of the neck and arms.    Today his pain is 3/10VAAS in severity. He is currently taking hydrocodone 10/325 very sparingly.  He also continues with Horizant 600 mg BID, Flexeril PRN (2-3 times a month), Cymbalta 30 mg daily. Reports moderate pain reduction with regimen, denies any adverse reactions.       He states he continues to tolerate Horizant better then he tolerated Gabapentin.  He states he no longer has the somnolence issues which were previously interfering with work and driving.      He states he has an occasional flare up in the right shoulder/axillary area where a tumor has been found. Monitoring for now. Recent consultation with neurosurgery at [facility] and radiation oncologist at [facility].  Says tumors have increased in size.  Thinks he will have them removed next spring at [facility].       Patient remained masked during entire encounter. No cough present. I donned a mask and eye protection throughout entire visit. Prior to donning mask and eye protection, hand hygiene was performed, as well as when it was doffed.  I was closer than 6 feet, but not for an extended period of time. No obvious exposure to any bodily fluids.    Neck Pain   This is a chronic problem. The current episode started more than 1 month ago. The problem occurs constantly. The problem has been gradually improving. The pain is present in the midline and right side. The quality of the pain is described as aching, cramping and shooting. The pain is at a severity of 3/10. The symptoms are aggravated by twisting. Associated symptoms include numbness and weakness. Pertinent negatives include no chest pain, fever or headaches. He has tried acetaminophen, bed rest, heat, ice, NSAIDs, oral narcotics, muscle relaxants, neck support and home exercises (hydrocodone) for the symptoms. The  "treatment provided moderate relief.   Arm Pain   The pain is present in the left hand, right hand, upper left arm and upper right arm. The quality of the pain is described as aching (tingling). The pain is at a severity of 3/10. The pain is mild. The pain has been fluctuating since the incident. Associated symptoms include numbness. Pertinent negatives include no chest pain.      PEG Assessment   What number best describes your pain on average in the past week?1  What number best describes how, during the past week, pain has interfered with your enjoyment of life?0  What number best describes how, during the past week, pain has interfered with your general activity?  0    The following portions of the patient's history were reviewed and updated as appropriate: allergies, current medications, past family history, past medical history, past social history, past surgical history and problem list.    Review of Systems   Constitutional: Positive for fatigue. Negative for fever.   HENT: Negative for congestion.    Eyes: Negative for visual disturbance.   Respiratory: Negative for shortness of breath.    Cardiovascular: Negative for chest pain.   Gastrointestinal: Negative for constipation.   Genitourinary: Negative for difficulty urinating.   Musculoskeletal: Positive for neck pain.   Neurological: Positive for weakness and numbness. Negative for headaches.   Psychiatric/Behavioral: Negative for sleep disturbance and suicidal ideas. The patient is not nervous/anxious.      I have reviewed and confirmed the accuracy of the ROS as documented by the MA/LPN/RN EMILIE Shrestha    Vitals:    08/13/21 1434   BP: 148/92   Pulse: 75   Resp: 18   Temp: 97.7 °F (36.5 °C)   SpO2: 95%   Weight: 113 kg (248 lb 9.6 oz)   Height: 172.7 cm (68\")   PainSc:   3   PainLoc: Neck     Objective   Physical Exam  Vitals and nursing note reviewed.   Constitutional:       General: He is not in acute distress.     Appearance: Normal " appearance. He is not ill-appearing.   Pulmonary:      Effort: Pulmonary effort is normal. No respiratory distress.   Musculoskeletal:      Cervical back: Tenderness present.      Lumbar back: Tenderness present.   Skin:     General: Skin is warm and dry.   Neurological:      Mental Status: He is alert and oriented to person, place, and time.      Motor: Motor function is intact.      Gait: Gait normal.   Psychiatric:         Mood and Affect: Mood normal.         Behavior: Behavior normal.       Assessment/Plan   Diagnoses and all orders for this visit:    1. Chronic pain syndrome (Primary)    2. Neuropathic pain, arm    3. Brachial plexus neuropathy    4. Neurofibromatosis (CMS/HCC)    5. Encounter for long-term (current) use of high-risk medication    Other orders  -     Gabapentin Enacarbil ER (Horizant) 600 MG tablet controlled-release; Take 600 mg by mouth 2 (Two) Times a Day.  Dispense: 60 tablet; Refill: 5      --- Continue Horizant  --- Continue Hydrocodone. Patient appears stable with current regimen. No adverse effects. Regarding continuation of opioids, there is no evidence of aberrant behavior or any red flags.  The patient continues with appropriate response to opioid therapy. ADL's remain intact by self.   --- The urine drug screen confirmation from 5/14/2021 has been reviewed and the result is appropriate based on patient history and BIRGIT report  --- Follow-up 3 months/sooner if needed        BIRGIT REPORT  As part of the patient's treatment plan, I am prescribing controlled substances. The patient has been made aware of appropriate use of such medications, including potential risk of somnolence, limited ability to drive and/or work safely, and the potential for dependence or overdose. It has also bee made clear that these medications are for use by this patient only, without concomitant use of alcohol or other substances unless prescribed.     Patient has completed prescribing agreement detailing  terms of continued prescribing of controlled substances, including monitoring BIRGIT reports, urine drug screening, and pill counts if necessary. The patient is aware that inappropriate use will results in cessation of prescribing such medications.    As the clinician, I personally reviewed the BIRGIT from 8/13/2021 while the patient was in the office today.    History and physical exam exhibit continued safe and appropriate use of controlled substances.     Dictated utilizing Dragon dictation.

## 2021-08-15 DIAGNOSIS — I10 ESSENTIAL HYPERTENSION: ICD-10-CM

## 2021-08-16 RX ORDER — AMLODIPINE BESYLATE 5 MG/1
5 TABLET ORAL DAILY
Qty: 90 TABLET | Refills: 1 | Status: SHIPPED | OUTPATIENT
Start: 2021-08-16 | End: 2022-02-01

## 2021-08-30 DIAGNOSIS — I10 HYPERTENSION, BENIGN: ICD-10-CM

## 2021-08-31 RX ORDER — LOSARTAN POTASSIUM 100 MG/1
100 TABLET ORAL DAILY
Qty: 90 TABLET | Refills: 1 | Status: SHIPPED | OUTPATIENT
Start: 2021-08-31 | End: 2022-02-21

## 2021-09-26 DIAGNOSIS — K21.9 GASTROESOPHAGEAL REFLUX DISEASE WITHOUT ESOPHAGITIS: ICD-10-CM

## 2021-09-27 RX ORDER — OMEPRAZOLE 40 MG/1
CAPSULE, DELAYED RELEASE ORAL
Qty: 90 CAPSULE | Refills: 0 | Status: SHIPPED | OUTPATIENT
Start: 2021-09-27 | End: 2021-12-17 | Stop reason: SDUPTHER

## 2021-10-01 ENCOUNTER — CLINICAL SUPPORT (OUTPATIENT)
Dept: INTERNAL MEDICINE | Facility: CLINIC | Age: 51
End: 2021-10-01

## 2021-10-01 DIAGNOSIS — Z23 NEED FOR INFLUENZA VACCINATION: Primary | ICD-10-CM

## 2021-10-01 PROCEDURE — 90471 IMMUNIZATION ADMIN: CPT | Performed by: FAMILY MEDICINE

## 2021-10-01 PROCEDURE — 90686 IIV4 VACC NO PRSV 0.5 ML IM: CPT | Performed by: FAMILY MEDICINE

## 2021-10-26 DIAGNOSIS — G54.0 BRACHIAL PLEXUS NEUROPATHY: ICD-10-CM

## 2021-10-26 RX ORDER — HYDROCODONE BITARTRATE AND ACETAMINOPHEN 10; 325 MG/1; MG/1
1 TABLET ORAL EVERY 4 HOURS PRN
Qty: 45 TABLET | Refills: 0 | Status: SHIPPED | OUTPATIENT
Start: 2021-10-26 | End: 2021-11-10 | Stop reason: SDUPTHER

## 2021-10-26 NOTE — TELEPHONE ENCOUNTER
Reviewed last office visit on 8/13/2021. UDS and BIRGIT reviewed and are appropriate. Due to Glenna Watson, APRN being out of office, will refill appropriately.

## 2021-10-26 NOTE — TELEPHONE ENCOUNTER
DELETE AFTER REVIEWING: Send the encounter HIGH priority, If patient has less than a 3 day supply. If the patient will run out of medication over the weekend add that information to the additional details line. Send this encounter to the clinical pool.    Caller: Isma De Luna    Relationship: Self        Requested Prescriptions:   Requested Prescriptions     Pending Prescriptions Disp Refills   • HYDROcodone-acetaminophen (NORCO)  MG per tablet 90 tablet 0     Sig: Take 1 tablet by mouth Every 4 (Four) Hours As Needed for Moderate Pain .        Pharm[facility] where request should be sent: [facility]       Best call back number: [phone]    Does the patient have less than a 3 day supply:  [x] Yes  [] No    Christi Clement Rep   10/26/21 13:13 EDT

## 2021-11-10 ENCOUNTER — TELEPHONE (OUTPATIENT)
Dept: PAIN MEDICINE | Facility: CLINIC | Age: 51
End: 2021-11-10

## 2021-11-10 DIAGNOSIS — G54.0 BRACHIAL PLEXUS NEUROPATHY: ICD-10-CM

## 2021-11-10 RX ORDER — HYDROCODONE BITARTRATE AND ACETAMINOPHEN 10; 325 MG/1; MG/1
1 TABLET ORAL EVERY 4 HOURS PRN
Qty: 45 TABLET | Refills: 0 | Status: SHIPPED | OUTPATIENT
Start: 2021-11-10 | End: 2021-11-19 | Stop reason: SDUPTHER

## 2021-11-10 NOTE — TELEPHONE ENCOUNTER
It has been worse. Right shoulder to the right elbow. It bone deep ache with occasional pain. Its been keeping him up at night and making it harder to work during the day.

## 2021-11-10 NOTE — TELEPHONE ENCOUNTER
Caller: Isma De Luna    Relationship: Self    Best call back number: [phone]    Requested Prescriptions:    HYDROcodone-acetaminophen (NORCO)  MG per tablet    Pharmacy where request should be sent:  [id number] - [address] - [phone]  - [phone]   [phone]    Additional details provided by patient: HAS ABOUT 5 DAYS LEFT BUT WILL BE OUT BEFORE HIS APPT 11/19/21    Does the patient have less than a 3 day supply:  [] Yes  [x] No    Christi Christine Rep   11/10/21 09:00 EST

## 2021-11-19 ENCOUNTER — OFFICE VISIT (OUTPATIENT)
Dept: PAIN MEDICINE | Facility: CLINIC | Age: 51
End: 2021-11-19

## 2021-11-19 VITALS
SYSTOLIC BLOOD PRESSURE: 137 MMHG | WEIGHT: 253.4 LBS | DIASTOLIC BLOOD PRESSURE: 93 MMHG | OXYGEN SATURATION: 98 % | BODY MASS INDEX: 38.4 KG/M2 | HEIGHT: 68 IN | RESPIRATION RATE: 16 BRPM | TEMPERATURE: 96.7 F | HEART RATE: 87 BPM

## 2021-11-19 DIAGNOSIS — Q85.00 NEUROFIBROMATOSIS (HCC): ICD-10-CM

## 2021-11-19 DIAGNOSIS — G54.0 BRACHIAL PLEXUS NEUROPATHY: Primary | ICD-10-CM

## 2021-11-19 DIAGNOSIS — G54.0: ICD-10-CM

## 2021-11-19 DIAGNOSIS — G89.4 CHRONIC PAIN SYNDROME: ICD-10-CM

## 2021-11-19 DIAGNOSIS — M79.2 NEUROPATHIC PAIN, ARM: ICD-10-CM

## 2021-11-19 DIAGNOSIS — Z79.899 ENCOUNTER FOR LONG-TERM (CURRENT) USE OF HIGH-RISK MEDICATION: ICD-10-CM

## 2021-11-19 LAB
POC AMPHETAMINES: NEGATIVE
POC BARBITURATES: NEGATIVE
POC BENZODIAZEPHINES: NEGATIVE
POC COCAINE: NEGATIVE
POC METHADONE: NEGATIVE
POC METHAMPHETAMINE SCREEN URINE: NEGATIVE
POC OPIATES: POSITIVE
POC OXYCODONE: NEGATIVE
POC PHENCYCLIDINE: NEGATIVE
POC PROPOXYPHENE: NEGATIVE
POC THC: NEGATIVE
POC TRICYCLIC ANTIDEPRESSANTS: NEGATIVE

## 2021-11-19 PROCEDURE — 99214 OFFICE O/P EST MOD 30 MIN: CPT | Performed by: NURSE PRACTITIONER

## 2021-11-19 PROCEDURE — 80305 DRUG TEST PRSMV DIR OPT OBS: CPT | Performed by: NURSE PRACTITIONER

## 2021-11-19 RX ORDER — MELATONIN
2000 DAILY
COMMUNITY
End: 2022-04-21

## 2021-11-19 RX ORDER — HYDROCODONE BITARTRATE AND ACETAMINOPHEN 10; 325 MG/1; MG/1
1 TABLET ORAL EVERY 4 HOURS PRN
Qty: 90 TABLET | Refills: 0 | Status: SHIPPED | OUTPATIENT
Start: 2021-11-19 | End: 2022-04-28 | Stop reason: SDUPTHER

## 2021-11-19 NOTE — PROGRESS NOTES
CHIEF COMPLAINT  F/U for neck pain and right arm pain.  Pt states his pain level has gotten worse.     Subjective   Isma De Luna is a 51 y.o. male  who presents for follow-up.  He has a history of neck pain and right arm pain.  Today his pain is 4/10VAS in severity. His pain flared significantly about 1 month ago.  He states that this has slightly improved, but he did have to increase the frequency of his Norco 10/325 use.      He is currently taking hydrocodone 10/325 3/day.  He also continues with Horizant 600 mg BID, Flexeril PRN (2-3 times a month), Cymbalta 30 mg daily.  This medication regimen decreases his pain by 85%.  He reports constipation.  He has increased his fiber which has been helpful.  He does have some drowsiness, he is still able to work.  ADLs by self.     He plans to move forward with surgery at [facility] in 2022 (likely April or May).     Patient remained masked during entire encounter. No cough present. I donned a mask and eye protection throughout entire visit. Prior to donning mask and eye protection, hand hygiene was performed, as well as when it was doffed.  I was closer than 6 feet, but not for an extended period of time. No obvious exposure to any bodily fluids.    Neck Pain   This is a chronic problem. The current episode started more than 1 month ago. The problem occurs constantly. The problem has been gradually improving. The pain is present in the midline and right side. The quality of the pain is described as aching, cramping and shooting. The pain is at a severity of 4/10. The symptoms are aggravated by twisting. Associated symptoms include numbness (left arm , across abdomen ) and weakness (left arm ). Pertinent negatives include no chest pain, fever or headaches. He has tried acetaminophen, bed rest, heat, ice, NSAIDs, oral narcotics, muscle relaxants, neck support and home exercises (hydrocodone) for the symptoms. The treatment provided moderate relief.   Arm Pain   The pain is  present in the left hand, right hand, upper left arm and upper right arm. The quality of the pain is described as aching (tingling). The pain is at a severity of 4/10. The pain is mild. The pain has been worsening since the incident. Associated symptoms include numbness (left arm , across abdomen ). Pertinent negatives include no chest pain.     PEG Assessment   What number best describes your pain on average in the past week?6  What number best describes how, during the past week, pain has interfered with your enjoyment of life?6  What number best describes how, during the past week, pain has interfered with your general activity?  6    The following portions of the patient's history were reviewed and updated as appropriate: allergies, current medications, past family history, past medical history, past social history, past surgical history and problem list.    Review of Systems   Constitutional: Positive for fatigue. Negative for activity change and fever.   HENT: Negative for congestion.    Eyes: Negative for visual disturbance.   Respiratory: Negative for cough and chest tightness.    Cardiovascular: Negative for chest pain.   Gastrointestinal: Positive for constipation. Negative for abdominal pain and diarrhea.   Genitourinary: Negative for difficulty urinating and dysuria.   Musculoskeletal: Positive for neck pain.   Neurological: Positive for weakness (left arm ) and numbness (left arm , across abdomen ). Negative for dizziness, light-headedness and headaches.   Psychiatric/Behavioral: Negative for agitation, sleep disturbance and suicidal ideas. The patient is not nervous/anxious.      --  The aforementioned information the Chief Complaint section and above subjective data including any HPI data, and also the Review of Systems data, has been personally reviewed and affirmed.  --    Office visit from 8/13/2012 with EMILIE Shrestha reviewed.  Patient has a history of chronic pain of neck and arms.  He  "is maintained on hydrocodone 10/325 very sparingly as well as Horizant 600 mg twice daily, Flexeril as needed and Cymbalta 30 mg daily.  He continues to tolerate Horizant better than gabapentin.  Patient has the occasional flare in his right shoulder/axillary area where the tumor has been found.  Neurosurgery consult at [facility] and radiation oncologist consultant Paramjit.  Tumors have increased in size.  Patient thinks he will proceed with having them removed next spring at [facility].  Continue medication regimen follow-up in 3 months or sooner if needed.    Vitals:    11/19/21 1441   BP: 137/93   Pulse: 87   Resp: 16   Temp: 96.7 °F (35.9 °C)   SpO2: 98%   Weight: 115 kg (253 lb 6.4 oz)   Height: 172.7 cm (68\")   PainSc:   4   PainLoc: Neck     Objective   Physical Exam  Vitals and nursing note reviewed.   Constitutional:       Appearance: Normal appearance. He is well-developed.   Eyes:      General: Lids are normal.   Cardiovascular:      Rate and Rhythm: Normal rate.   Pulmonary:      Effort: Pulmonary effort is normal.   Musculoskeletal:      Right shoulder: Tenderness present.      Cervical back: Tenderness present. Decreased range of motion.   Neurological:      Mental Status: He is alert and oriented to person, place, and time.   Psychiatric:         Attention and Perception: Attention normal.         Mood and Affect: Mood normal.         Speech: Speech normal.         Behavior: Behavior normal.         Judgment: Judgment normal.       Assessment/Plan   Diagnoses and all orders for this visit:    1. Brachial plexus neuropathy (Primary)  -     HYDROcodone-acetaminophen (NORCO)  MG per tablet; Take 1 tablet by mouth Every 4 (Four) Hours As Needed for Moderate Pain . DNF 11/24/2021  Dispense: 90 tablet; Refill: 0    2. Chronic pain syndrome    3. Neuropathic pain, arm    4. Neurofibromatosis (HCC)    5. Encounter for long-term (current) use of high-risk medication    6. Bilateral brachial plexus " lesions      --- Routine UDS in office today as part of monitoring requirements for controlled substances.  The specimen was viewed and the immunoassay result reviewed and is +OPI.  This specimen will be sent to [facility] laboratory for confirmation.     --- Refill Norco 10/325. DNF 11/24/2021 applied. Patient appears stable with current regimen. No adverse effects. Regarding continuation of opioids, there is no evidence of aberrant behavior or any red flags.  The patient continues with appropriate response to opioid therapy. ADL's remain intact by self.   --- Discussed with patient that since his Norco use has increased significantly over the last month due to his increase in pain will follow up in office more closely for the time being.  Patient states understanding.  --- Follow-up 1 month or sooner if needed.      BIRGIT REPORT  As part of the patient's treatment plan, I am prescribing controlled substances. The patient has been made aware of appropriate use of such medications, including potential risk of somnolence, limited ability to drive and/or work safely, and the potential for dependence or overdose. It has also bee made clear that these medications are for use by this patient only, without concomitant use of alcohol or other substances unless prescribed.     Patient has completed prescribing agreement detailing terms of continued prescribing of controlled substances, including monitoring BIRGIT reports, urine drug screening, and pill counts if necessary. The patient is aware that inappropriate use will results in cessation of prescribing such medications.    As the clinician, I personally reviewed the BIRGIT from 11/19/2021 while the patient was in the office today.    History and physical exam exhibit continued safe and appropriate use of controlled substances.    Dictated utilizing Dragon dictation.

## 2021-11-23 ENCOUNTER — OFFICE VISIT (OUTPATIENT)
Dept: INTERNAL MEDICINE | Facility: CLINIC | Age: 51
End: 2021-11-23

## 2021-11-23 VITALS
TEMPERATURE: 97.5 F | HEIGHT: 68 IN | WEIGHT: 249 LBS | BODY MASS INDEX: 37.74 KG/M2 | HEART RATE: 101 BPM | DIASTOLIC BLOOD PRESSURE: 82 MMHG | SYSTOLIC BLOOD PRESSURE: 120 MMHG | OXYGEN SATURATION: 98 %

## 2021-11-23 DIAGNOSIS — N18.30 CHRONIC RENAL IMPAIRMENT, STAGE 3 (MODERATE), UNSPECIFIED WHETHER STAGE 3A OR 3B CKD (HCC): ICD-10-CM

## 2021-11-23 DIAGNOSIS — I10 PRIMARY HYPERTENSION: Primary | ICD-10-CM

## 2021-11-23 DIAGNOSIS — E78.49 OTHER HYPERLIPIDEMIA: ICD-10-CM

## 2021-11-23 PROCEDURE — 99213 OFFICE O/P EST LOW 20 MIN: CPT | Performed by: FAMILY MEDICINE

## 2021-11-23 RX ORDER — LANOLIN ALCOHOL/MO/W.PET/CERES
1000 CREAM (GRAM) TOPICAL DAILY
COMMUNITY

## 2021-11-23 RX ORDER — ELECTROLYTES/DEXTROSE
SOLUTION, ORAL ORAL
COMMUNITY

## 2021-11-23 NOTE — PROGRESS NOTES
"Chief Complaint  Establish Care, Hypertension, and Shoulder Pain (right shoulder)    Subjective    History of Present Illness {CC  Problem List  Visit  Diagnosis   Encounters  Notes  Medications  Labs  Result Review Imaging  Media :23}     Isma De Luna presents to [facility] for   History of Present Illness   50 yo male present to [facility] care. Pt is new to me, previously seen by Dr. Benoit.  He has a history of hypertension, hyperlipidemia, Schwannomatosis,history of kidney cancer (no left kideny, partial R kidney),     Mild depression, states doing well on Cymbalta  2006 diagnosed with Schwannomatosis    R shoulder flare pain, shoulder down to elbow, constant dull ache 8/10 at worse. With medication 2/10. Has had to up hydrocodone to 25-30 mg a day.  [facility] for surgery on R shoulder to remove the tumors (3).     Objective     Vital Signs:   /82 (BP Location: Left arm, Patient Position: Sitting, Cuff Size: Adult)   Pulse 101   Temp 97.5 °F (36.4 °C) (Temporal)   Ht 172.7 cm (68\")   Wt 113 kg (249 lb)   SpO2 98%   BMI 37.86 kg/m²      Physical Exam  Vitals reviewed.   Constitutional:       General: He is not in acute distress.     Appearance: He is not ill-appearing.   HENT:      Head: Normocephalic.      Right Ear: Tympanic membrane normal.      Left Ear: Tympanic membrane normal.   Eyes:      Conjunctiva/sclera: Conjunctivae normal.   Cardiovascular:      Rate and Rhythm: Regular rhythm.      Pulses: Normal pulses.      Heart sounds: Normal heart sounds.   Pulmonary:      Effort: Pulmonary effort is normal. No respiratory distress.      Breath sounds: Normal breath sounds. No wheezing.   Abdominal:      General: Bowel sounds are normal.      Palpations: Abdomen is soft.   Musculoskeletal:      Right lower leg: No edema.      Left lower leg: No edema.   Neurological:      Mental Status: He is alert.   Psychiatric:         Mood and Affect: Mood normal. "        Result Review  Data Reviewed:{ Labs  Result Review  Imaging  Med Tab  Media :23}   The following data was reviewed by: Alia Mancia MD on 11/23/2021  Lab Results - Last 18 Months   Lab Units 06/25/21  1622 03/09/21  0918 10/09/20  0955 06/19/20  0954 06/19/20  0954   GLUCOSE mg/dL  --  102*  --   --   --    BUN mg/dL 17 15 16   < > 19   CREATININE mg/dL 1.37* 1.30 1.31*   < > 1.48*   EGFR IF NONAFRICN AM mL/min/1.73 55*  --  58*  --  50*   EGFR IF AFRICN AM mL/min/1.73 66  --  70  --  61   SODIUM mmol/L 139 141 143   < > 142   POTASSIUM mmol/L 4.2 4.4 4.2   < > 4.6   CHLORIDE mmol/L 102 107 107   < > 103   CALCIUM mg/dL 9.9 9.0 9.2   < > 9.9   ALBUMIN g/dL 4.80 4.5 4.70   < > 4.60   BILIRUBIN mg/dL 0.4 0.5 0.5   < > 0.3   ALK PHOS U/L 80 66 66   < > 67   AST (SGOT) U/L 24 32 23   < > 22   ALT (SGPT) U/L 22 27 20   < > 15   CHOLESTEROL mg/dL 181  --  190  --  208*   TRIGLYCERIDES mg/dL 136  --  178*  --  158*   HDL CHOL mg/dL 39*  --  42  --  45   VLDL CHOL mg/dL  --   --   --   --  31.6   VLDL CHOLESTEROL FRANCIS mg/dL 24  --  31  --   --    LDL CHOL mg/dL 118*  --  117*  --  131*   WBC 10*3/mm3 6.10 6.21 5.20   < > 5.58   RBC 10*6/mm3 4.80 4.78 4.82   < > 4.74   HEMATOCRIT % 40.7 41.0 41.3   < > 40.8   MCV fL 84.8 85.8 85.7   < > 86.1   MCH pg 28.3 29.1 28.4   < > 29.3   TSH uIU/mL  --   --  1.090  --  2.330   PSA ng/mL  --   --   --   --  1.640   URIC ACID mg/dL  --   --   --   --  4.9    < > = values in this interval not displayed.     Consultation notes for pain management and previous pcp notes       Current Outpatient Medications:   •  amLODIPine (NORVASC) 5 MG tablet, TAKE 1 TABLET BY MOUTH DAILY, Disp: 90 tablet, Rfl: 1  •  B Complex-Biotin-FA (HM VITAMIN B100 COMPLEX PO), Take 1 tablet by mouth daily., Disp: , Rfl:   •  cetirizine (ZyrTEC Allergy) 10 MG tablet, , Disp: , Rfl:   •  Cholecalciferol (VITAMIN D3) 2000 units tablet, Take 1,000 mg by mouth Daily., Disp: , Rfl:   •  cyclobenzaprine  (FLEXERIL) 10 MG tablet, TAKE 1 TABLET BY MOUTH THREE TIMES DAILY AS NEEDED FOR MUSCLE SPASMS, Disp: 90 tablet, Rfl: 1  •  DULoxetine (CYMBALTA) 60 MG capsule, TK 1 C PO D, Disp: , Rfl:   •  febuxostat (ULORIC) 40 MG tablet, TAKE 1 TABLET BY MOUTH DAILY INSTEAD OF ALLOPURINOL, Disp: 90 tablet, Rfl: 1  •  ferrous sulfate (IRON SUPPLEMENT) 325 (65 FE) MG tablet, Take 45 mg by mouth Daily., Disp: , Rfl:   •  fluticasone (Flonase Sensimist) 27.5 MCG/SPRAY nasal spray, 2 sprays into the nostril(s) as directed by provider Daily., Disp: , Rfl:   •  Gabapentin Enacarbil ER (Horizant) 600 MG tablet controlled-release, Take 600 mg by mouth 2 (Two) Times a Day., Disp: 60 tablet, Rfl: 5  •  HYDROcodone-acetaminophen (NORCO)  MG per tablet, Take 1 tablet by mouth Every 4 (Four) Hours As Needed for Moderate Pain . DNF 11/24/2021, Disp: 90 tablet, Rfl: 0  •  losartan (COZAAR) 100 MG tablet, TAKE 1 TABLET BY MOUTH DAILY, Disp: 90 tablet, Rfl: 1  •  Multiple Vitamin (MULTI-VITAMIN DAILY PO), Take  by mouth Daily., Disp: , Rfl:   •  NON FORMULARY, Whole Body Herbal Complex, Disp: , Rfl:   •  omeprazole (priLOSEC) 40 MG capsule, TAKE 1 CAPSULE BY MOUTH DAILY, Disp: 90 capsule, Rfl: 0  •  Pyridoxine HCl (Vitamin B6) 100 MG tablet, Take  by mouth., Disp: , Rfl:   •  vitamin B-12 (CYANOCOBALAMIN) 1000 MCG tablet, Take 1,000 mcg by mouth Daily., Disp: , Rfl:   •  cholecalciferol (VITAMIN D3) 25 MCG (1000 UT) tablet, Take 2,000 Units by mouth Daily., Disp: , Rfl:   •  folic acid-vit B6-vit B12 (FOLTABS) 0.8-10-0.115 MG tablet tablet, Take  by mouth Daily., Disp: , Rfl:       Assessment and Plan {CC Problem List  Visit Diagnosis  ROS  Review (Popup)  Health Maintenance  Quality  BestPractice  Medications  SmartSets  SnapShot Encounters  Media :23}   Problem List Items Addressed This Visit        Cardiac and Vasculature    Hypertension - Primary    Current Assessment & Plan     Hypertension is chronic, stable.  Continue  current treatment regimen.  Dietary sodium restriction.  Weight loss.  Continue current medications.  Blood pressure will be reassessed in 3 months.  BP controlled on current medication.          Relevant Orders    Comprehensive metabolic panel    Hyperlipidemia    Current Assessment & Plan     Lipid abnormalities are chronic, stable.  Nutritional counseling was provided. and recheck lipid panel today   Lipids will be reassessed in 3 months.         Relevant Orders    Lipid panel       Genitourinary and Reproductive     Chronic renal impairment, stage 3 (moderate) (HCC)    Current Assessment & Plan     Renal condition is chronic, s/p L kidney  and partial R kidney resection.  Continue current treatment regimen.  Monitor daily weight.  Continue current medications.  Renal condition will be reassessed in 3 months.  Check bmp today                Follow Up   Return in about 4 months (around 3/23/2022) for Annual physical.  Patient was given instructions and counseling regarding his condition or for health maintenance advice. Please see specific information pulled into the AVS if appropriate.    EpicAct:[email or web address]_ORDERS,RunParams:STARTUPTYPE=FOLLOW    [email or web address]

## 2021-11-23 NOTE — ASSESSMENT & PLAN NOTE
Hypertension is chronic, stable.  Continue current treatment regimen.  Dietary sodium restriction.  Weight loss.  Continue current medications.  Blood pressure will be reassessed in 3 months.  BP controlled on current medication.

## 2021-11-23 NOTE — ASSESSMENT & PLAN NOTE
Lipid abnormalities are chronic, stable.  Nutritional counseling was provided. and recheck lipid panel today   Lipids will be reassessed in 3 months.

## 2021-11-23 NOTE — ASSESSMENT & PLAN NOTE
Renal condition is chronic, s/p L kidney  and partial R kidney resection.  Continue current treatment regimen.  Monitor daily weight.  Continue current medications.  Renal condition will be reassessed in 3 months.  Check bmp today

## 2021-11-24 LAB
ALBUMIN SERPL-MCNC: 4.6 G/DL (ref 3.8–4.9)
ALBUMIN/GLOB SERPL: 1.9 {RATIO} (ref 1.2–2.2)
ALP SERPL-CCNC: 75 IU/L (ref 44–121)
ALT SERPL-CCNC: 36 IU/L (ref 0–44)
AST SERPL-CCNC: 32 IU/L (ref 0–40)
BILIRUB SERPL-MCNC: 0.4 MG/DL (ref 0–1.2)
BUN SERPL-MCNC: 17 MG/DL (ref 6–24)
BUN/CREAT SERPL: 11 (ref 9–20)
CALCIUM SERPL-MCNC: 10 MG/DL (ref 8.7–10.2)
CHLORIDE SERPL-SCNC: 103 MMOL/L (ref 96–106)
CHOLEST SERPL-MCNC: 208 MG/DL (ref 100–199)
CO2 SERPL-SCNC: 25 MMOL/L (ref 20–29)
CREAT SERPL-MCNC: 1.49 MG/DL (ref 0.76–1.27)
GLOBULIN SER CALC-MCNC: 2.4 G/DL (ref 1.5–4.5)
GLUCOSE SERPL-MCNC: 103 MG/DL (ref 65–99)
HDLC SERPL-MCNC: 38 MG/DL
LDLC SERPL CALC-MCNC: 126 MG/DL (ref 0–99)
POTASSIUM SERPL-SCNC: 4.9 MMOL/L (ref 3.5–5.2)
PROT SERPL-MCNC: 7 G/DL (ref 6–8.5)
SODIUM SERPL-SCNC: 142 MMOL/L (ref 134–144)
TRIGL SERPL-MCNC: 250 MG/DL (ref 0–149)
VLDLC SERPL CALC-MCNC: 44 MG/DL (ref 5–40)

## 2021-12-17 ENCOUNTER — TELEPHONE (OUTPATIENT)
Dept: INTERNAL MEDICINE | Facility: CLINIC | Age: 51
End: 2021-12-17

## 2021-12-17 DIAGNOSIS — K21.9 GASTROESOPHAGEAL REFLUX DISEASE WITHOUT ESOPHAGITIS: ICD-10-CM

## 2021-12-17 RX ORDER — OMEPRAZOLE 40 MG/1
40 CAPSULE, DELAYED RELEASE ORAL DAILY
Qty: 90 CAPSULE | Refills: 1 | Status: SHIPPED | OUTPATIENT
Start: 2021-12-17 | End: 2022-06-23

## 2021-12-17 NOTE — TELEPHONE ENCOUNTER
----- Message from Isma De Luna sent at 12/17/2021  8:50 AM EST -----  Regarding: Refill for Omeprazole 40mg  Hi Dr. Mancia,    I am writing to request a refill for my Omeprazole prescription.  I am out of refills from the script that Dr. Benoit wrote for me some time ago.    The dosage is 40mg, once daily, quantity 90.  Please send it to my pharmacy on file: [facility].    Thank you and I hope you have a great day!

## 2021-12-21 ENCOUNTER — TELEPHONE (OUTPATIENT)
Dept: PAIN MEDICINE | Facility: CLINIC | Age: 51
End: 2021-12-21

## 2021-12-21 ENCOUNTER — TELEMEDICINE (OUTPATIENT)
Dept: PAIN MEDICINE | Facility: CLINIC | Age: 51
End: 2021-12-21

## 2021-12-21 DIAGNOSIS — Q85.00 NEUROFIBROMATOSIS (HCC): ICD-10-CM

## 2021-12-21 DIAGNOSIS — G54.0 BRACHIAL PLEXUS NEUROPATHY: Primary | ICD-10-CM

## 2021-12-21 DIAGNOSIS — G89.4 CHRONIC PAIN SYNDROME: ICD-10-CM

## 2021-12-21 DIAGNOSIS — Z79.899 ENCOUNTER FOR LONG-TERM (CURRENT) USE OF HIGH-RISK MEDICATION: ICD-10-CM

## 2021-12-21 DIAGNOSIS — M79.2 NEUROPATHIC PAIN, ARM: ICD-10-CM

## 2021-12-21 PROCEDURE — 99213 OFFICE O/P EST LOW 20 MIN: CPT | Performed by: NURSE PRACTITIONER

## 2021-12-21 NOTE — PROGRESS NOTES
TELEMEDICINE - VIDEO VISIT    You have chosen to receive care through a telehealth visit.  Do you consent to use a video/audio connection for your medical care today? Yes    Location of patient:Private Residence  Location of Provider:[facility]  Anyone else present-No  Type of Technology used- ZOOM through use of Epic/MyChart visit    CHIEF COMPLAINT: F/u neck and arm pain    Subjective   Isma De Luna is a 51 y.o. male  who presents for a video visit follow-up.He has a history of neck pain and right arm pain. Today his pain is 2/10VAS in severity. At his last office visit he was requiring significantly more pain medication, his worsening pain has now improved and he has returned to very sparing use of his Norco 10/325. He continues with Horizant 600 mg BID and flexeril PRN (sparingly)    Patient has been feeling under the weather. He had a PCR test for COVID and is waiting on results.     Neck Pain   This is a chronic problem. The current episode started more than 1 month ago. The problem occurs constantly. The problem has been gradually improving. The pain is present in the midline and right side. The quality of the pain is described as aching, cramping and shooting. The pain is at a severity of 2/10. The symptoms are aggravated by twisting. Associated symptoms include a fever, numbness (left arm , across abdomen ) and weakness (left arm ). Pertinent negatives include no chest pain or headaches. He has tried acetaminophen, bed rest, heat, ice, NSAIDs, oral narcotics, muscle relaxants, neck support and home exercises (hydrocodone) for the symptoms. The treatment provided moderate relief.   Arm Pain   The pain is present in the left hand, right hand, upper left arm and upper right arm. The quality of the pain is described as aching (tingling). The pain is at a severity of 2/10. The pain is mild. The pain has been worsening since the incident. Associated symptoms include numbness (left arm , across abdomen  ). Pertinent negatives include no chest pain.      The following portions of the patient's history were reviewed and updated as appropriate: allergies, current medications, past family history, past medical history, past social history, past surgical history and problem list.    Review of Systems   Constitutional: Positive for chills, fatigue and fever.   HENT: Positive for postnasal drip.    Respiratory: Negative for cough and shortness of breath.    Cardiovascular: Negative for chest pain.   Musculoskeletal: Positive for neck pain (right arm pain).   Neurological: Positive for weakness (left arm ) and numbness (left arm , across abdomen ). Negative for headaches.     --  The aforementioned information the Chief Complaint section and above subjective data including any HPI data, and also the Review of Systems data, has been personally reviewed and affirmed.  --    Vitals:    12/21/21 1600   PainSc:   2   PainLoc: Neck     Objective   Physical Exam  Constitutional:       Appearance: Normal appearance.   Pulmonary:      Effort: Pulmonary effort is normal.   Neurological:      Mental Status: He is alert.   Psychiatric:         Mood and Affect: Mood normal.         Behavior: Behavior normal.         Thought Content: Thought content normal.         Judgment: Judgment normal.       Assessment/Plan   Diagnoses and all orders for this visit:    1. Brachial plexus neuropathy (Primary)    2. Chronic pain syndrome    3. Neuropathic pain, arm    4. Neurofibromatosis (HCC)    5. Encounter for long-term (current) use of high-risk medication      ----------------    Our practice is offering alternative &/or electronic methods to continue to follow our patients while at the same time further the efforts toward social distancing, in accordance with our organizational policies, professional societies' guidance, and gubernatorial mandates.  I support the Healthy at Home campaign and in this visit I have counseled the patient on our  needs to limit in-person office visits and physical encounters with medical facilities whenever possible.  I have also educated the patient on the medical necessities of maintaining social distancing while we continue to function during this crisis period.      The patient was counseled on the need to consider telehealth options. The patient was offered the opportunity for a Video Visit using [employer]. The patient agreed to a Video Visit.    ----------------    --- The urine drug screen confirmation from 11/19/2021 has been reviewed and the result is appropriate based on patient history and BIRGIT report  --- Continue sparing use of Norco 10/325. Patient appears stable with current regimen. No adverse effects. Regarding continuation of opioids, there is no evidence of aberrant behavior or any red flags.  The patient continues with appropriate response to opioid therapy. ADL's remain intact by self.   --- Continue Horizant, no refill needed.   --- Follow-up 2 months or sooner if needed (return to every 3 months after this follow up).      BIRGIT REPORT  As part of the patient's treatment plan, I am prescribing controlled substances. The patient has been made aware of appropriate use of such medications, including potential risk of somnolence, limited ability to drive and/or work safely, and the potential for dependence or overdose. It has also bee made clear that these medications are for use by this patient only, without concomitant use of alcohol or other substances unless prescribed.     Patient has completed prescribing agreement detailing terms of continued prescribing of controlled substances, including monitoring BIRGIT reports, urine drug screening, and pill counts if necessary. The patient is aware that inappropriate use will results in cessation of prescribing such medications.    As the clinician, I personally reviewed the BIRGIT from 12/21/2021 while the patient was in the office today.    History and  physical exam exhibit continued safe and appropriate use of controlled substances.    -------    EMR Dragon/Transcription disclaimer:   Much of this encounter note is an electronic transcription/translation of spoken language to printed text. The electronic translation of spoken language may permit erroneous, or at times, nonsensical words or phrases to be inadvertently transcribed; Although I have reviewed the note for such errors, some may still exist.         This document is intended for medical expert use only. Reading of this document by patients and/or patient's family without participating medical staff guidance may result in misinterpretation and unintended morbidity.   Any interpretation of such data is the responsibility of the patient and/or family member responsible for the patient in concert with their primary or specialist providers, not to be left for sources of online searches such as [employer], [employer] or similar queries. Relying on these approaches to knowledge may result in misinterpretation, misguided goals of care and even death should patients or family members try recommendations outside of the realm of professional medical care in a supervised way.

## 2021-12-21 NOTE — TELEPHONE ENCOUNTER
Provider: VINAY  Caller: RANDEE  Phone Number: [phone]  Reason for Call: PT HAS COVID SYMPTOMS AND ASKED IF HE CAN DO A VIDEO APPT/ MYCHART FOR HIS APPT THIS AFTERNOON

## 2021-12-21 NOTE — TELEPHONE ENCOUNTER
Called and spoke to pt. He sts he started feeling ill this morning. He sts he has a low grade fever 99.2, fatigue, slight cough, no SOA. He sts he would like to get covid tested and can call us with the results. Would you like to change his office visit to video or wait for covid results?

## 2022-01-31 DIAGNOSIS — I10 ESSENTIAL HYPERTENSION: ICD-10-CM

## 2022-02-01 RX ORDER — AMLODIPINE BESYLATE 5 MG/1
5 TABLET ORAL DAILY
Qty: 90 TABLET | Refills: 1 | Status: SHIPPED | OUTPATIENT
Start: 2022-02-01 | End: 2022-03-29 | Stop reason: SDUPTHER

## 2022-02-18 ENCOUNTER — OFFICE VISIT (OUTPATIENT)
Dept: PAIN MEDICINE | Facility: CLINIC | Age: 52
End: 2022-02-18

## 2022-02-18 VITALS
HEIGHT: 68 IN | DIASTOLIC BLOOD PRESSURE: 80 MMHG | TEMPERATURE: 97.1 F | OXYGEN SATURATION: 95 % | SYSTOLIC BLOOD PRESSURE: 150 MMHG | WEIGHT: 256.2 LBS | HEART RATE: 81 BPM | BODY MASS INDEX: 38.83 KG/M2

## 2022-02-18 DIAGNOSIS — M54.2 NECK PAIN: ICD-10-CM

## 2022-02-18 DIAGNOSIS — Z79.899 ENCOUNTER FOR LONG-TERM (CURRENT) USE OF HIGH-RISK MEDICATION: ICD-10-CM

## 2022-02-18 DIAGNOSIS — Q85.00 NEUROFIBROMATOSIS: ICD-10-CM

## 2022-02-18 DIAGNOSIS — G54.0 BRACHIAL PLEXUS NEUROPATHY: ICD-10-CM

## 2022-02-18 DIAGNOSIS — M79.2 NEUROPATHIC PAIN, ARM: ICD-10-CM

## 2022-02-18 DIAGNOSIS — G89.4 CHRONIC PAIN SYNDROME: Primary | ICD-10-CM

## 2022-02-18 PROCEDURE — 99214 OFFICE O/P EST MOD 30 MIN: CPT | Performed by: NURSE PRACTITIONER

## 2022-02-18 NOTE — PROGRESS NOTES
CHIEF COMPLAINT  F/U neck pain- patient states that his pain has remained the same since his last visit.     Subjective   Isma De Luna is a 51 y.o. male  who presents for follow-up.  He has a history of chronic bilateral arm pain, neck pain.    Today his pain is 1/10VAAS in severity. He is currently taking hydrocodone 10/325 very sparingly.  He also continues with Horizant 600 mg BID, Flexeril PRN (2-3 times a month), Cymbalta 30 mg daily. Reports moderate pain reduction with regimen, denies any adverse reactions.      He states he continues to tolerate Horizant better then he tolerated Gabapentin.  He states he no longer has the somnolence issues which were previously interfering with work and driving.     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  Had consultation with neurosurgery at [facility] last summer.  Says tumors have increased in size.  Planning to proceed with surgery to remove the tumor in April of this year at [facility].         Patient remained masked during entire encounter. No cough present. I donned a mask and eye protection throughout entire visit. Prior to donning mask and eye protection, hand hygiene was performed, as well as when it was doffed.  I was closer than 6 feet, but not for an extended period of time. No obvious exposure to any bodily fluids.    Neck Pain   This is a chronic problem. The current episode started more than 1 month ago. The problem occurs constantly. The problem has been waxing and waning. The pain is present in the midline and right side. The quality of the pain is described as aching, cramping and shooting. The pain is at a severity of 3/10. The symptoms are aggravated by twisting. Associated symptoms include numbness (left arm) and weakness (left arm). Pertinent negatives include no chest pain, fever or headaches. He has tried acetaminophen, bed rest, heat, ice, NSAIDs, oral narcotics, muscle  relaxants, neck support and home exercises (hydrocodone) for the symptoms. The treatment provided moderate relief.   Arm Pain   The pain is present in the left hand, right hand, upper left arm and upper right arm. The quality of the pain is described as aching (tingling). The pain is at a severity of 3/10. The pain is mild. The pain has been fluctuating since the incident. Associated symptoms include numbness (left arm). Pertinent negatives include no chest pain.     PEG Assessment   What number best describes your pain on average in the past week?3  What number best describes how, during the past week, pain has interfered with your enjoyment of life?0  What number best describes how, during the past week, pain has interfered with your general activity?  2    The following portions of the patient's history were reviewed and updated as appropriate: allergies, current medications, past family history, past medical history, past social history, past surgical history and problem list.    Review of Systems   Constitutional: Positive for fatigue. Negative for activity change, chills and fever.   HENT: Negative for congestion.    Eyes: Negative for visual disturbance.   Respiratory: Negative for chest tightness and shortness of breath.    Cardiovascular: Negative for chest pain.   Gastrointestinal: Negative for abdominal pain, constipation and diarrhea.   Genitourinary: Negative for difficulty urinating and dysuria.   Musculoskeletal: Positive for neck pain.   Neurological: Positive for weakness (left arm) and numbness (left arm). Negative for dizziness, light-headedness and headaches.   Psychiatric/Behavioral: Negative for agitation, self-injury, sleep disturbance and suicidal ideas. The patient is not nervous/anxious.      I have reviewed and confirmed the accuracy of the ROS as documented by the MA/LPN/RN EMILIE Shrestha    Vitals:    02/18/22 1501   BP: 150/80   Pulse: 81   Temp: 97.1 °F (36.2 °C)   SpO2: 95%  "  Weight: 116 kg (256 lb 3.2 oz)   Height: 172.7 cm (68\")   PainSc:   1   PainLoc: Neck     Objective   Physical Exam  Vitals and nursing note reviewed.   Constitutional:       General: He is not in acute distress.     Appearance: Normal appearance. He is not ill-appearing.   Pulmonary:      Effort: Pulmonary effort is normal. No respiratory distress.   Musculoskeletal:      Right shoulder: Tenderness present.      Left shoulder: Tenderness present.      Cervical back: Tenderness present.   Skin:     General: Skin is warm and dry.   Neurological:      Mental Status: He is alert and oriented to person, place, and time.      Motor: Motor function is intact.      Gait: Gait normal.   Psychiatric:         Mood and Affect: Mood normal.         Behavior: Behavior normal.       Assessment/Plan   Diagnoses and all orders for this visit:    1. Chronic pain syndrome (Primary)    2. Neuropathic pain, arm    3. Neurofibromatosis (HCC)    4. Brachial plexus neuropathy    5. Neck pain    6. Encounter for long-term (current) use of high-risk medication      --- Continue Horizant  --- Continue Hydrocodone (refill not needed). Patient appears stable with current regimen. No adverse effects. Regarding continuation of opioids, there is no evidence of aberrant behavior or any red flags.  The patient continues with appropriate response to opioid therapy. ADL's remain intact by self.   --- The urine drug screen confirmation from 11/19/2021 has been reviewed and the result is appropriate based on patient history and BIRGIT report  --- Follow-up 3 months          BIRGIT REPORT  As part of the patient's treatment plan, I am prescribing controlled substances. The patient has been made aware of appropriate use of such medications, including potential risk of somnolence, limited ability to drive and/or work safely, and the potential for dependence or overdose. It has also bee made clear that these medications are for use by this patient only, " without concomitant use of alcohol or other substances unless prescribed.     Patient has completed prescribing agreement detailing terms of continued prescribing of controlled substances, including monitoring BIRGIT reports, urine drug screening, and pill counts if necessary. The patient is aware that inappropriate use will results in cessation of prescribing such medications.    As the clinician, I personally reviewed the BIRGIT from 2/18/2022 while the patient was in the office today.    History and physical exam exhibit continued safe and appropriate use of controlled substances.     Dictated utilizing Dragon dictation.     This document is intended for medical expert use only. Reading of this document by patients and/or patient's family without participating medical staff guidance may result in misinterpretation and unintended morbidity.   Any interpretation of such data is the responsibility of the patient and/or family member responsible for the patient in concert with their primary or specialist providers, not to be left for sources of online searches such as [employer], [employer] or similar queries. Relying on these approaches to knowledge may result in misinterpretation, misguided goals of care and even death should patients or family members try recommendations outside of the realm of professional medical care in a supervised way.

## 2022-02-21 DIAGNOSIS — I10 HYPERTENSION, BENIGN: ICD-10-CM

## 2022-02-21 DIAGNOSIS — M79.2 NEUROPATHIC PAIN, ARM: ICD-10-CM

## 2022-02-21 RX ORDER — LOSARTAN POTASSIUM 100 MG/1
100 TABLET ORAL DAILY
Qty: 90 TABLET | Refills: 1 | Status: SHIPPED | OUTPATIENT
Start: 2022-02-21 | End: 2022-10-31 | Stop reason: SDUPTHER

## 2022-02-21 RX ORDER — CYCLOBENZAPRINE HCL 10 MG
TABLET ORAL
Qty: 90 TABLET | Refills: 1 | Status: SHIPPED | OUTPATIENT
Start: 2022-02-21 | End: 2022-04-21 | Stop reason: ALTCHOICE

## 2022-02-23 RX ORDER — GABAPENTIN ENACARBIL 600 MG/1
1 TABLET, EXTENDED RELEASE ORAL 2 TIMES DAILY
Qty: 60 TABLET | Refills: 5 | Status: SHIPPED | OUTPATIENT
Start: 2022-02-23 | End: 2022-08-31

## 2022-03-07 ENCOUNTER — HOSPITAL ENCOUNTER (OUTPATIENT)
Dept: CARDIOLOGY | Facility: HOSPITAL | Age: 52
Discharge: HOME | End: 2022-03-07
Admitting: INTERNAL MEDICINE

## 2022-03-07 VITALS
WEIGHT: 256 LBS | BODY MASS INDEX: 38.8 KG/M2 | SYSTOLIC BLOOD PRESSURE: 140 MMHG | HEART RATE: 74 BPM | HEIGHT: 68 IN | DIASTOLIC BLOOD PRESSURE: 78 MMHG

## 2022-03-07 DIAGNOSIS — I10 ESSENTIAL HYPERTENSION: ICD-10-CM

## 2022-03-07 LAB
BH CV ECHO MEAS - ACS: 2.26 CM
BH CV ECHO MEAS - AO MAX PG: 9.2 MMHG
BH CV ECHO MEAS - AO MEAN PG: 4.2 MMHG
BH CV ECHO MEAS - AO ROOT DIAM: 3.9 CM
BH CV ECHO MEAS - AO V2 MAX: 152 CM/SEC
BH CV ECHO MEAS - AO V2 VTI: 28.5 CM
BH CV ECHO MEAS - AVA(I,D): 2.36 CM2
BH CV ECHO MEAS - CONTRAST EF (2CH): 74 CM2
BH CV ECHO MEAS - CONTRAST EF 4CH: 76 CM2
BH CV ECHO MEAS - EDV(CUBED): 63.9 ML
BH CV ECHO MEAS - EDV(MOD-SP2): 121 ML
BH CV ECHO MEAS - EDV(MOD-SP4): 159 ML
BH CV ECHO MEAS - EF(MOD-BP): 74 %
BH CV ECHO MEAS - EF(MOD-SP2): 72.7 %
BH CV ECHO MEAS - EF(MOD-SP4): 76.1 %
BH CV ECHO MEAS - ESV(CUBED): 17.5 ML
BH CV ECHO MEAS - ESV(MOD-SP2): 33 ML
BH CV ECHO MEAS - ESV(MOD-SP4): 38 ML
BH CV ECHO MEAS - FS: 35.1 %
BH CV ECHO MEAS - IVS/LVPW: 0.87 CM
BH CV ECHO MEAS - IVSD: 0.86 CM
BH CV ECHO MEAS - LAT PEAK E' VEL: 17.3 CM/SEC
BH CV ECHO MEAS - LV DIASTOLIC VOL/BSA (35-75): 70.3 CM2
BH CV ECHO MEAS - LV MASS(C)D: 114.2 GRAMS
BH CV ECHO MEAS - LV MAX PG: 4.3 MMHG
BH CV ECHO MEAS - LV MEAN PG: 2.25 MMHG
BH CV ECHO MEAS - LV SYSTOLIC VOL/BSA (12-30): 16.8 CM2
BH CV ECHO MEAS - LV V1 MAX: 103.4 CM/SEC
BH CV ECHO MEAS - LV V1 VTI: 20.8 CM
BH CV ECHO MEAS - LVIDD: 4 CM
BH CV ECHO MEAS - LVIDS: 2.6 CM
BH CV ECHO MEAS - LVOT AREA: 3.2 CM2
BH CV ECHO MEAS - LVOT DIAM: 2.03 CM
BH CV ECHO MEAS - LVPWD: 0.99 CM
BH CV ECHO MEAS - MED PEAK E' VEL: 10.6 CM/SEC
BH CV ECHO MEAS - MR MAX PG: 21.6 MMHG
BH CV ECHO MEAS - MR MAX VEL: 232.2 CM/SEC
BH CV ECHO MEAS - MV A DUR: 0.15 SEC
BH CV ECHO MEAS - MV A MAX VEL: 76.5 CM/SEC
BH CV ECHO MEAS - MV DEC SLOPE: 718.6 CM/SEC2
BH CV ECHO MEAS - MV DEC TIME: 0.15 MSEC
BH CV ECHO MEAS - MV E MAX VEL: 103 CM/SEC
BH CV ECHO MEAS - MV E/A: 1.35
BH CV ECHO MEAS - MV MAX PG: 3.9 MMHG
BH CV ECHO MEAS - MV MEAN PG: 1.86 MMHG
BH CV ECHO MEAS - MV V2 VTI: 27.5 CM
BH CV ECHO MEAS - MVA(VTI): 2.44 CM2
BH CV ECHO MEAS - PA ACC TIME: 0.13 SEC
BH CV ECHO MEAS - PA PR(ACCEL): 20.4 MMHG
BH CV ECHO MEAS - PA V2 MAX: 120.3 CM/SEC
BH CV ECHO MEAS - PULM A REVS DUR: 0.14 SEC
BH CV ECHO MEAS - PULM A REVS VEL: 29.7 CM/SEC
BH CV ECHO MEAS - PULM DIAS VEL: 28.1 CM/SEC
BH CV ECHO MEAS - PULM SYS VEL: 47.1 CM/SEC
BH CV ECHO MEAS - RV MAX PG: 3.7 MMHG
BH CV ECHO MEAS - RV V1 MAX: 96 CM/SEC
BH CV ECHO MEAS - RV V1 VTI: 19.8 CM
BH CV ECHO MEAS - RVOT DIAM: 1.97 CM
BH CV ECHO MEAS - SI(MOD-SP2): 38.9 ML/M2
BH CV ECHO MEAS - SI(MOD-SP4): 53.5 ML/M2
BH CV ECHO MEAS - SV(LVOT): 67.2 ML
BH CV ECHO MEAS - SV(MOD-SP2): 88 ML
BH CV ECHO MEAS - SV(MOD-SP4): 121 ML
BH CV ECHO MEAS - SV(RVOT): 60.1 ML
BH CV ECHO MEAS - TAPSE (>1.6): 2.36 CM
BH CV ECHO MEAS - TR MAX PG: 19.1 MMHG
BH CV ECHO MEAS - TR MAX VEL: 218.5 CM/SEC
BH CV ECHO MEASUREMENTS AVERAGE E/E' RATIO: 7.38
BH CV XLRA - RV BASE: 3.6 CM
BH CV XLRA - RV LENGTH: 7.2 CM
BH CV XLRA - RV MID: 3.2 CM
BH CV XLRA - TDI S': 17 CM/SEC
LEFT ATRIUM VOLUME INDEX: 26.9 ML/M2
MAXIMAL PREDICTED HEART RATE: 169 BPM
STRESS TARGET HR: 144 BPM

## 2022-03-07 PROCEDURE — 25010000002 PERFLUTREN (DEFINITY) 8.476 MG IN SODIUM CHLORIDE (PF) 0.9 % 10 ML INJECTION: Performed by: INTERNAL MEDICINE

## 2022-03-07 PROCEDURE — 93306 TTE W/DOPPLER COMPLETE: CPT | Performed by: INTERNAL MEDICINE

## 2022-03-07 PROCEDURE — 93306 TTE W/DOPPLER COMPLETE: CPT

## 2022-03-07 RX ADMIN — PERFLUTREN 1.5 ML: 6.52 INJECTION, SUSPENSION INTRAVENOUS at 15:55

## 2022-03-09 ENCOUNTER — TELEPHONE (OUTPATIENT)
Dept: CARDIOLOGY | Facility: CLINIC | Age: 52
End: 2022-03-09

## 2022-03-09 NOTE — PROGRESS NOTES
Patient made aware of echocardiogram results.  He will keep his previously scheduled appointment with Dr. Castanon 3/18/2022.

## 2022-03-18 ENCOUNTER — OFFICE VISIT (OUTPATIENT)
Dept: CARDIOLOGY | Facility: CLINIC | Age: 52
End: 2022-03-18

## 2022-03-18 VITALS
HEIGHT: 68 IN | BODY MASS INDEX: 38.16 KG/M2 | DIASTOLIC BLOOD PRESSURE: 84 MMHG | HEART RATE: 87 BPM | SYSTOLIC BLOOD PRESSURE: 130 MMHG | WEIGHT: 251.8 LBS

## 2022-03-18 DIAGNOSIS — Q23.9 ABNORMALITY OF AORTIC VALVE: Primary | ICD-10-CM

## 2022-03-18 PROCEDURE — 93000 ELECTROCARDIOGRAM COMPLETE: CPT | Performed by: INTERNAL MEDICINE

## 2022-03-18 PROCEDURE — 99214 OFFICE O/P EST MOD 30 MIN: CPT | Performed by: INTERNAL MEDICINE

## 2022-03-18 NOTE — PROGRESS NOTES
"      CARDIOLOGY    Patricio Castanon MD    ENCOUNTER DATE:  03/18/2022    Isma De Luna / 52 y.o. / male        CHIEF COMPLAINT / REASON FOR OFFICE VISIT     Essential hypertension (03/12/2021 Follow up)      HISTORY OF PRESENT ILLNESS       HPI  Isma De Luna is a 52 y.o. male who presents today for reevaluation.  Overall patient is actually doing well with no cardiac complaints.  Blood pressures been remaining fairly stable and his primary care physician continues to follow this.  Patient was found to have some stranding on his aortic valve several years ago.  He recently had an echocardiogram repeated.  The strands look less prominent than they did in the past.  At the minimum they are definitively not more prominent.      The following portions of the patient's history were reviewed and updated as appropriate: allergies, current medications, past family history, past medical history, past social history, past surgical history and problem list.      VITAL SIGNS     Visit Vitals  /84 (BP Location: Left arm)   Pulse 87   Ht 172.7 cm (68\")   Wt 114 kg (251 lb 12.8 oz)   BMI 38.29 kg/m²         Wt Readings from Last 3 Encounters:   03/18/22 114 kg (251 lb 12.8 oz)   03/07/22 116 kg (256 lb)   02/18/22 116 kg (256 lb 3.2 oz)     Body mass index is 38.29 kg/m².      REVIEW OF SYSTEMS   ROS        PHYSICAL EXAMINATION     Vitals reviewed.   Constitutional:       Appearance: Healthy appearance.   Pulmonary:      Effort: Pulmonary effort is normal.   Cardiovascular:      Normal rate. Regular rhythm. Normal S1. Normal S2.      Murmurs: There is no murmur.      No gallop. No click. No rub.   Pulses:     Intact distal pulses.   Edema:     Peripheral edema absent.   Neurological:      Mental Status: Alert.           REVIEWED DATA       ECG 12 Lead    Date/Time: 3/18/2022 2:55 PM  Performed by: Patricio Castanon MD  Authorized by: Patricio Castanon MD   Comparison: compared with previous ECG from 3/12/2021  Similar " to previous ECG  Rhythm: sinus rhythm    Clinical impression: normal ECG            Cardiac Procedures:  Interpretation Summary  3/7/21    · Calculated left ventricular EF = 74% Estimated left ventricular EF was in agreement with the calculated left ventricular EF. Left ventricular systolic function is hyperdynamic (EF > 70%).  · Left ventricular diastolic function was normal.  · There are some mild strands noted on the aortic valve with no evidence of mass. Compared to prior echo the strands seen less pronounced.  · There is no significant valvular regurgitation or stenosis     1.     Lipid Panel    Lipid Panel 6/25/21 11/23/21   Total Cholesterol 181 208 (A)   Triglycerides 136 250 (A)   HDL Cholesterol 39 (A) 38 (A)   VLDL Cholesterol 24 44 (A)   LDL Cholesterol  118 (A) 126 (A)   (A) Abnormal value       Comments are available for some flowsheets but are not being displayed.               ASSESSMENT & PLAN      Diagnosis Plan   1. Abnormality of aortic valve           SUMMARY/DISCUSSION  1. Abnormal stranding on the aortic valve.  As noted above it looks less than the past.  With that in mind I would not do any further work-up at this time.  I would repeat his echo in about 5 years unless of course something changes between now and then.  2. Hypertension blood pressures good his primary care provider has followed him closely and I would defer to them in the future.  3. Patient told to follow-up on an as-needed basis.        MEDICATIONS         Discharge Medications          Accurate as of March 18, 2022  2:57 PM. If you have any questions, ask your nurse or doctor.            Continue These Medications      Instructions Start Date   amLODIPine 5 MG tablet  Commonly known as: NORVASC   5 mg, Oral, Daily      cetirizine 10 MG tablet  Commonly known as: zyrTEC   No dose, route, or frequency recorded.      cyclobenzaprine 10 MG tablet  Commonly known as: FLEXERIL   TAKE 1 TABLET BY MOUTH THREE TIMES DAILY AS NEEDED  FOR MUSCLE SPASMS      DULoxetine 60 MG capsule  Commonly known as: CYMBALTA   TK 1 C PO D      febuxostat 40 MG tablet  Commonly known as: ULORIC   TAKE 1 TABLET BY MOUTH DAILY INSTEAD OF ALLOPURINOL      ferrous sulfate 325 (65 FE) MG tablet   45 mg, Oral, Daily      fluticasone 27.5 MCG/SPRAY nasal spray  Commonly known as: VERAMYST   2 sprays, Nasal, Daily      folic acid-vit B6-vit B12 0.8-10-0.115 MG tablet tablet  Commonly known as: FOLTABS   Oral, Daily      HM VITAMIN B100 COMPLEX PO   1 tablet, Oral, Daily      Horizant 600 MG tablet controlled-release  Generic drug: Gabapentin Enacarbil ER   600 mg, Oral, 2 Times Daily      HYDROcodone-acetaminophen  MG per tablet  Commonly known as: NORCO   1 tablet, Oral, Every 4 Hours PRN, DNF 11/24/2021      losartan 100 MG tablet  Commonly known as: COZAAR   100 mg, Oral, Daily      multivitamin tablet tablet  Commonly known as: THERAGRAN   Oral, Daily      NON FORMULARY   Whole Body Herbal Complex       omeprazole 40 MG capsule  Commonly known as: priLOSEC   40 mg, Oral, Daily      vitamin B-12 1000 MCG tablet  Commonly known as: CYANOCOBALAMIN   1,000 mcg, Oral, Daily      Vitamin B6 100 MG tablet   Oral      Vitamin D3 50 MCG (2000 UT) tablet   1,000 mg, Oral, Daily      cholecalciferol 25 MCG (1000 UT) tablet  Commonly known as: VITAMIN D3   2,000 Units, Oral, Daily                 **Dragon Disclaimer:   Much of this encounter note is an electronic transcription/translation of spoken language to printed text. The electronic translation of spoken language may permit erroneous, or at times, nonsensical words or phrases to be inadvertently transcribed. Although I have reviewed the note for such errors, some may still exist.

## 2022-03-29 ENCOUNTER — OFFICE VISIT (OUTPATIENT)
Dept: INTERNAL MEDICINE | Facility: CLINIC | Age: 52
End: 2022-03-29

## 2022-03-29 VITALS
SYSTOLIC BLOOD PRESSURE: 145 MMHG | DIASTOLIC BLOOD PRESSURE: 92 MMHG | TEMPERATURE: 98.4 F | OXYGEN SATURATION: 98 % | HEART RATE: 80 BPM | RESPIRATION RATE: 17 BRPM | HEIGHT: 68 IN | BODY MASS INDEX: 38.04 KG/M2 | WEIGHT: 251 LBS

## 2022-03-29 DIAGNOSIS — E53.8 LOW VITAMIN B12 LEVEL: ICD-10-CM

## 2022-03-29 DIAGNOSIS — Z87.39 HISTORY OF GOUT: ICD-10-CM

## 2022-03-29 DIAGNOSIS — G89.4 CHRONIC PAIN SYNDROME: ICD-10-CM

## 2022-03-29 DIAGNOSIS — I10 ESSENTIAL HYPERTENSION: ICD-10-CM

## 2022-03-29 DIAGNOSIS — E78.49 OTHER HYPERLIPIDEMIA: ICD-10-CM

## 2022-03-29 DIAGNOSIS — I10 PRIMARY HYPERTENSION: Primary | ICD-10-CM

## 2022-03-29 DIAGNOSIS — Z86.39 HISTORY OF VITAMIN D DEFICIENCY: ICD-10-CM

## 2022-03-29 PROCEDURE — 99396 PREV VISIT EST AGE 40-64: CPT | Performed by: FAMILY MEDICINE

## 2022-03-29 RX ORDER — AMLODIPINE BESYLATE 10 MG/1
10 TABLET ORAL DAILY
Qty: 90 TABLET | Refills: 1 | Status: SHIPPED | OUTPATIENT
Start: 2022-03-29 | End: 2022-09-12

## 2022-03-30 LAB
25(OH)D3+25(OH)D2 SERPL-MCNC: 60 NG/ML (ref 30–100)
ALBUMIN SERPL-MCNC: 4.8 G/DL (ref 3.8–4.9)
ALBUMIN/GLOB SERPL: 2 {RATIO} (ref 1.2–2.2)
ALP SERPL-CCNC: 76 IU/L (ref 44–121)
ALT SERPL-CCNC: 22 IU/L (ref 0–44)
AMPHETAMINES UR QL SCN: NEGATIVE NG/ML
AST SERPL-CCNC: 25 IU/L (ref 0–40)
BARBITURATES UR QL SCN: NEGATIVE NG/ML
BASOPHILS # BLD AUTO: 0.1 X10E3/UL (ref 0–0.2)
BASOPHILS NFR BLD AUTO: 1 %
BENZODIAZ UR QL SCN: NEGATIVE NG/ML
BILIRUB SERPL-MCNC: 0.5 MG/DL (ref 0–1.2)
BUN SERPL-MCNC: 15 MG/DL (ref 6–24)
BUN/CREAT SERPL: 12 (ref 9–20)
BZE UR QL SCN: NEGATIVE NG/ML
CALCIUM SERPL-MCNC: 9.7 MG/DL (ref 8.7–10.2)
CANNABINOIDS UR QL SCN: NEGATIVE NG/ML
CHLORIDE SERPL-SCNC: 103 MMOL/L (ref 96–106)
CHOLEST SERPL-MCNC: 206 MG/DL (ref 100–199)
CO2 SERPL-SCNC: 23 MMOL/L (ref 20–29)
CREAT SERPL-MCNC: 1.29 MG/DL (ref 0.76–1.27)
CREAT UR-MCNC: 53.6 MG/DL (ref 20–300)
EGFRCR SERPLBLD CKD-EPI 2021: 67 ML/MIN/1.73
EOSINOPHIL # BLD AUTO: 0.1 X10E3/UL (ref 0–0.4)
EOSINOPHIL NFR BLD AUTO: 1 %
ERYTHROCYTE [DISTWIDTH] IN BLOOD BY AUTOMATED COUNT: 12.7 % (ref 11.6–15.4)
FOLATE SERPL-MCNC: >20 NG/ML
GLOBULIN SER CALC-MCNC: 2.4 G/DL (ref 1.5–4.5)
GLUCOSE SERPL-MCNC: 100 MG/DL (ref 65–99)
HCT VFR BLD AUTO: 43.2 % (ref 37.5–51)
HDLC SERPL-MCNC: 43 MG/DL
HGB BLD-MCNC: 14.3 G/DL (ref 13–17.7)
IMM GRANULOCYTES # BLD AUTO: 0 X10E3/UL (ref 0–0.1)
IMM GRANULOCYTES NFR BLD AUTO: 0 %
LABORATORY COMMENT REPORT: NORMAL
LDLC SERPL CALC-MCNC: 134 MG/DL (ref 0–99)
LYMPHOCYTES # BLD AUTO: 1.7 X10E3/UL (ref 0.7–3.1)
LYMPHOCYTES NFR BLD AUTO: 25 %
MCH RBC QN AUTO: 28.1 PG (ref 26.6–33)
MCHC RBC AUTO-ENTMCNC: 33.1 G/DL (ref 31.5–35.7)
MCV RBC AUTO: 85 FL (ref 79–97)
METHADONE UR QL SCN: NEGATIVE NG/ML
MONOCYTES # BLD AUTO: 0.6 X10E3/UL (ref 0.1–0.9)
MONOCYTES NFR BLD AUTO: 8 %
NEUTROPHILS # BLD AUTO: 4.5 X10E3/UL (ref 1.4–7)
NEUTROPHILS NFR BLD AUTO: 65 %
OPIATES UR QL SCN: NEGATIVE NG/ML
OXYCODONE+OXYMORPHONE UR QL SCN: NEGATIVE NG/ML
PCP UR QL: NEGATIVE NG/ML
PH UR: 6.1 [PH] (ref 4.5–8.9)
PLATELET # BLD AUTO: 227 X10E3/UL (ref 150–450)
POTASSIUM SERPL-SCNC: 4 MMOL/L (ref 3.5–5.2)
PROPOXYPH UR QL SCN: NEGATIVE NG/ML
PROT SERPL-MCNC: 7.2 G/DL (ref 6–8.5)
RBC # BLD AUTO: 5.09 X10E6/UL (ref 4.14–5.8)
SODIUM SERPL-SCNC: 143 MMOL/L (ref 134–144)
TRIGL SERPL-MCNC: 160 MG/DL (ref 0–149)
URATE SERPL-MCNC: 3.9 MG/DL (ref 3.8–8.4)
VIT B12 SERPL-MCNC: 594 PG/ML (ref 232–1245)
VLDLC SERPL CALC-MCNC: 29 MG/DL (ref 5–40)
WBC # BLD AUTO: 6.9 X10E3/UL (ref 3.4–10.8)

## 2022-04-20 DIAGNOSIS — Z87.39 HISTORY OF GOUT: ICD-10-CM

## 2022-04-20 RX ORDER — FEBUXOSTAT 40 MG/1
TABLET, FILM COATED ORAL
Qty: 90 TABLET | Refills: 1 | Status: SHIPPED | OUTPATIENT
Start: 2022-04-20 | End: 2022-10-31 | Stop reason: SDUPTHER

## 2022-04-21 ENCOUNTER — OFFICE VISIT (OUTPATIENT)
Dept: PAIN MEDICINE | Facility: CLINIC | Age: 52
End: 2022-04-21

## 2022-04-21 ENCOUNTER — OFFICE VISIT (OUTPATIENT)
Dept: INTERNAL MEDICINE | Facility: CLINIC | Age: 52
End: 2022-04-21

## 2022-04-21 VITALS
DIASTOLIC BLOOD PRESSURE: 81 MMHG | HEIGHT: 68 IN | SYSTOLIC BLOOD PRESSURE: 131 MMHG | OXYGEN SATURATION: 95 % | TEMPERATURE: 96.9 F | HEART RATE: 97 BPM | BODY MASS INDEX: 39.15 KG/M2 | WEIGHT: 258.3 LBS | RESPIRATION RATE: 18 BRPM

## 2022-04-21 VITALS
HEIGHT: 68 IN | TEMPERATURE: 98 F | WEIGHT: 259.4 LBS | DIASTOLIC BLOOD PRESSURE: 79 MMHG | OXYGEN SATURATION: 93 % | HEART RATE: 98 BPM | SYSTOLIC BLOOD PRESSURE: 124 MMHG | BODY MASS INDEX: 39.31 KG/M2

## 2022-04-21 DIAGNOSIS — Q85.00 NEUROFIBROMATOSIS: ICD-10-CM

## 2022-04-21 DIAGNOSIS — G89.4 CHRONIC PAIN SYNDROME: Primary | ICD-10-CM

## 2022-04-21 DIAGNOSIS — G54.0: Primary | ICD-10-CM

## 2022-04-21 DIAGNOSIS — M79.2 NEUROPATHIC PAIN, ARM: ICD-10-CM

## 2022-04-21 DIAGNOSIS — G54.0: ICD-10-CM

## 2022-04-21 DIAGNOSIS — M54.2 NECK PAIN: ICD-10-CM

## 2022-04-21 DIAGNOSIS — Z79.899 ENCOUNTER FOR LONG-TERM (CURRENT) USE OF HIGH-RISK MEDICATION: ICD-10-CM

## 2022-04-21 PROCEDURE — 99214 OFFICE O/P EST MOD 30 MIN: CPT | Performed by: NURSE PRACTITIONER

## 2022-04-21 PROCEDURE — 99213 OFFICE O/P EST LOW 20 MIN: CPT | Performed by: FAMILY MEDICINE

## 2022-04-21 RX ORDER — METHOCARBAMOL 750 MG/1
750 TABLET, FILM COATED ORAL 3 TIMES DAILY PRN
Qty: 45 TABLET | Refills: 1 | Status: SHIPPED | OUTPATIENT
Start: 2022-04-21 | End: 2022-05-16

## 2022-04-21 RX ORDER — NORTRIPTYLINE HYDROCHLORIDE 10 MG/1
10 CAPSULE ORAL NIGHTLY
Qty: 30 CAPSULE | Refills: 1 | Status: SHIPPED | OUTPATIENT
Start: 2022-04-21 | End: 2022-05-25 | Stop reason: SDUPTHER

## 2022-04-21 NOTE — PROGRESS NOTES
"    Chief Complaint  Follow-up (Follow up from surgery)    Subjective    History of Present Illness {CC  Problem List  Visit  Diagnosis   Encounters  Notes  Medications  Labs  Result Review Imaging  Media :23}     Isma De Luna presents to [facility] for   History of Present Illness     53 yo male present for follow up visit. Since last visit he has had the tumor removed from his brachial plexus at the [facility].  He is doing as good as he expected. States seen pain management came up with better pain control plan. Will take norco 10/325 every 4 hours, robaxin, and continue hoizant and duloxetine.    Sensitive are over top of lower forearm.  Burning sensation.     Social History     Socioeconomic History   • Marital status: Single   Tobacco Use   • Smoking status: Never Smoker   • Smokeless tobacco: Never Used   Substance and Sexual Activity   • Alcohol use: Never   • Drug use: Yes     Types: Hydrocodone, Other     Comment: Prescribed NORCO/Gabapentin   • Sexual activity: Yes     Partners: Female      Objective     Vital Signs:   /79 (BP Location: Left arm, Patient Position: Sitting, Cuff Size: Adult)   Pulse 98   Temp 98 °F (36.7 °C) (Temporal)   Ht 172.7 cm (67.99\")   Wt 118 kg (259 lb 6.4 oz)   SpO2 93%   BMI 39.45 kg/m²   Physical Exam  Constitutional:       General: He is not in acute distress.     Appearance: He is not ill-appearing.   Skin:     Comments: Incision healing well  Steri strips in place  No drainage  No erythema   Neurological:      Mental Status: He is alert.        Result Review  Data Reviewed:{ Labs  Result Review  Imaging  Med Tab  Media :23}   The following data was reviewed by: Alia Mancia MD on 04/21/2022  Lab Results - Last 18 Months   Lab Units 04/14/22  0447 03/29/22  1516 03/10/22  0828 11/23/21  1017 06/25/21  1622 03/09/21  0918   GLUCOSE mg/dL  --   --   --   --   --  102*   BUN mg/dL  --  15  --  17 17 15 "   CREATININE mg/dL  --  1.29*  --  1.49* 1.37* 1.30   EGFR IF NONAFRICN AM mL/min/1.73  --   --   --  54* 55*  --    EGFR IF AFRICN AM mL/min/1.73  --   --   --  62 66  --    SODIUM mmol/L  --  143  --  142 139 141   POTASSIUM mmol/L  --  4.0  --  4.9 4.2 4.4   CHLORIDE mmol/L  --  103  --  103 102 107   CALCIUM mg/dL  --  9.7  --  10.0 9.9 9.0   ALBUMIN g/dL  --  4.8  --  4.6 4.80 4.5   BILIRUBIN mg/dL  --  0.5  --  0.4 0.4 0.5   ALK PHOS IU/L  --  76  --  75 80 66   AST (SGOT) IU/L  --  25  --  32 24 32   ALT (SGPT) IU/L  --  22  --  36 22 27   CHOLESTEROL mg/dL  --  206*  --  208* 181  --    TRIGLYCERIDES mg/dL  --  160*  --  250* 136  --    HDL CHOL mg/dL  --  43  --  38* 39*  --    VLDL CHOLESTEROL FRANCIS mg/dL  --  29  --  44* 24  --    LDL CHOL mg/dL  --  134*  --  126* 118*  --    WBC x10(9)/L 12.8* 6.9 5.78  --  6.10 6.21   RBC x10(12)/L 3.81* 5.09 4.92  --  4.80 4.78   HEMATOCRIT % 32.5* 43.2 41.3  --  40.7 41.0   MCV fL 85.3 85 83.9  --  84.8 85.8   MCH pg  --  28.1 28.0  --  28.3 29.1   VIT D 25 HYDROXY ng/mL  --  60.0  --   --   --   --    URIC ACID mg/dL  --  3.9  --   --   --   --               Current Outpatient Medications:   •  amLODIPine (NORVASC) 10 MG tablet, Take 1 tablet by mouth Daily., Disp: 90 tablet, Rfl: 1  •  B Complex-Biotin-FA (HM VITAMIN B100 COMPLEX PO), Take 1 tablet by mouth daily., Disp: , Rfl:   •  cetirizine (zyrTEC) 10 MG tablet, , Disp: , Rfl:   •  Cholecalciferol (VITAMIN D3) 2000 units tablet, Take 1,000 mg by mouth Daily., Disp: , Rfl:   •  DULoxetine (CYMBALTA) 60 MG capsule, TK 1 C PO D, Disp: , Rfl:   •  febuxostat (ULORIC) 40 MG tablet, TAKE 1 TABLET BY MOUTH DAILY INSTEAD OF ALLOPURINOL, Disp: 90 tablet, Rfl: 1  •  ferrous sulfate 325 (65 FE) MG tablet, Take 45 mg by mouth Daily., Disp: , Rfl:   •  fluticasone (VERAMYST) 27.5 MCG/SPRAY nasal spray, 2 sprays into the nostril(s) as directed by provider Daily., Disp: , Rfl:   •  Horizant 600 MG tablet controlled-release,  TAKE 600 MG BY MOUTH 2 (TWO) TIMES A DAY., Disp: 60 tablet, Rfl: 5  •  HYDROcodone-acetaminophen (NORCO)  MG per tablet, Take 1 tablet by mouth Every 4 (Four) Hours As Needed for Moderate Pain . DNF 11/24/2021, Disp: 90 tablet, Rfl: 0  •  losartan (COZAAR) 100 MG tablet, TAKE 1 TABLET BY MOUTH DAILY, Disp: 90 tablet, Rfl: 1  •  methocarbamol (ROBAXIN) 750 MG tablet, Take 1 tablet by mouth 3 (Three) Times a Day As Needed for Muscle Spasms., Disp: 45 tablet, Rfl: 1  •  Multiple Vitamin (MULTI-VITAMIN DAILY PO), Take  by mouth Daily., Disp: , Rfl:   •  NON FORMULARY, Whole Body Herbal Complex, Disp: , Rfl:   •  nortriptyline (PAMELOR) 10 MG capsule, Take 1 capsule by mouth Every Night., Disp: 30 capsule, Rfl: 1  •  omeprazole (priLOSEC) 40 MG capsule, Take 1 capsule by mouth Daily., Disp: 90 capsule, Rfl: 1  •  Pyridoxine HCl (Vitamin B6) 100 MG tablet, Take  by mouth., Disp: , Rfl:   •  vitamin B-12 (CYANOCOBALAMIN) 1000 MCG tablet, Take 1,000 mcg by mouth Daily., Disp: , Rfl:       Assessment and Plan {CC Problem List  Visit Diagnosis  ROS  Review (Popup)  Health Maintenance  Quality  BestPractice  Medications  SmartSets  SnapShot Encounters  Media :23}   Problem List Items Addressed This Visit        Neuro    Bilateral brachial plexus lesions - Primary    Current Assessment & Plan     S/p excision.   Doing well   Pain managed by pain management.   He does not believe he needs PT at this time  Continue follow up with [facility]                 Follow Up   Return if symptoms worsen or fail to improve, for keep scheduled appointment. .  Patient was given instructions and counseling regarding his condition or for health maintenance advice. Please see specific information pulled into the AVS if appropriate.    EpicAct:[email or web address]_ORDERS,RunParams:STARTUPTYPE=FOLLOW    MR_[email or web address]_DISCHARGE  Answers for HPI/ROS submitted by the patient on 4/20/2022  What is the primary reason for your visit?:  Other  Please describe your symptoms.: Follow-up after 4/13/2022 right brachial plexus surgery at [facility] with Dr. Emanuel Ramos.   Experiencing pain, hypersensitive areas, spasms, weakened right hand and random nerve “zings”. Wound appears fine but needs to be checked.  Have you had these symptoms before?: No  How long have you been having these symptoms?: 5-7 days  Please list any medications you are currently taking for this condition.: Increased dosage of gabapentin, increased usage of hydrocodone/apap , increased usage of flexeril.  Please describe any probable cause for these symptoms. : Surgery at [facility] in 4/13.

## 2022-04-21 NOTE — PROGRESS NOTES
CHIEF COMPLAINT  Follow-up for neck pain.    Subjective   Isma De Luna is a 52 y.o. male  who presents for follow-up.  He has a history of pain of the neck, shoulders, arms.  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 He is a patient at the [facility].  Had surgery on the left side a couple of years ago. He is post-op (4/13/2022) removal of right brachial plexus tumors.  Today he reports that he is doing ok, still reporting incisional discomfort, as well as nerve irritation. Given Oxycodone 5 mg post op but has switched back to hydrocodone which seems to help more.  Also given Robaxin which does help.  Reports that he continues with Horizant, has been prescribed additional 300 mg tid for acute post op pain (nerve related).      Today his pain is 4/10VAS in severity. He typically takes hydrocodone 10/325 very sparingly, since surgery has been taking every 4 hours. He also continues with Horizant 600 mg BID, Flexeril PRN (2-3 times a month), Cymbalta 60 mg daily. Reports moderate pain reduction with regimen, denies any adverse reactions.  He reports that his most significant issue is nerve pain and hypersensitive at night. He reports that he barely moves his arm against the sheet and it caused severe discomfort.      He states he continues to tolerate Horizant better then he tolerated Gabapentin.  He states he no longer has the somnolence issues which were previously interfering with work and driving.     Patient remained masked during entire encounter. No cough present. I donned a mask and eye protection throughout entire visit. Prior to donning mask and eye protection, hand hygiene was performed, as well as when it was doffed.  I was closer than 6 feet, but not for an extended period of time. No obvious exposure to any bodily fluids.    Neck Pain   This is a chronic problem. The current episode started more than 1 month ago. The problem occurs constantly. The problem has been  waxing and waning. The pain is present in the midline and right side. The quality of the pain is described as aching, cramping and shooting. The pain is at a severity of 4/10. The symptoms are aggravated by twisting. Associated symptoms include numbness (right hand) and weakness (right hand). Pertinent negatives include no chest pain, fever or headaches. He has tried acetaminophen, bed rest, heat, ice, NSAIDs, oral narcotics, muscle relaxants, neck support and home exercises (hydrocodone) for the symptoms. The treatment provided moderate relief.   Arm Pain   The pain is present in the left hand, right hand, upper left arm and upper right arm. The quality of the pain is described as aching (tingling). The pain radiates to the right arm and right hand. The pain is at a severity of 4/10. The pain is mild. The pain has been fluctuating since the incident. Associated symptoms include numbness (right hand). Pertinent negatives include no chest pain.      PEG Assessment   What number best describes your pain on average in the past week?8  What number best describes how, during the past week, pain has interfered with your enjoyment of life?6  What number best describes how, during the past week, pain has interfered with your general activity?  6    Review of Pertinent Medical Data ---  I reviewed [facility] hospital admission for surgery 4/13/2022 to 4/14/2022.  Surgery was excision of multiple right brachial plexus tumors.  He was released the following day in stable condition.    The following portions of the patient's history were reviewed and updated as appropriate: allergies, current medications, past family history, past medical history, past social history, past surgical history and problem list.    Review of Systems   Constitutional: Positive for fatigue. Negative for fever.   HENT: Negative for congestion.    Eyes: Negative for visual disturbance.   Respiratory: Negative for shortness of breath.    Cardiovascular:  "Negative for chest pain.   Gastrointestinal: Positive for constipation.   Genitourinary: Negative for difficulty urinating.   Musculoskeletal: Positive for neck pain.   Neurological: Positive for weakness (right hand) and numbness (right hand). Negative for headaches.   Psychiatric/Behavioral: Positive for sleep disturbance. Negative for suicidal ideas. The patient is not nervous/anxious.      I have reviewed and confirmed the accuracy of the ROS as documented by the MA/LPN/RN EMILIE Shrestha    Vitals:    04/21/22 1114   BP: 131/81   Pulse: 97   Resp: 18   Temp: 96.9 °F (36.1 °C)   SpO2: 95%   Weight: 117 kg (258 lb 4.8 oz)   Height: 172.7 cm (68\")   PainSc:   4   PainLoc: Neck     Objective   Physical Exam  Vitals and nursing note reviewed.   Constitutional:       General: He is not in acute distress.     Appearance: Normal appearance. He is not ill-appearing.   Pulmonary:      Effort: Pulmonary effort is normal. No respiratory distress.   Musculoskeletal:      Right shoulder: Tenderness present.      Left shoulder: Tenderness present.      Right upper arm: Tenderness present.      Right hand: Tenderness present. Decreased strength.      Cervical back: Tenderness present.   Skin:     General: Skin is warm and dry.          Neurological:      Mental Status: He is alert and oriented to person, place, and time.      Gait: Gait normal.   Psychiatric:         Mood and Affect: Mood normal.         Behavior: Behavior normal.         Assessment/Plan   Diagnoses and all orders for this visit:    1. Chronic pain syndrome (Primary)    2. Neuropathic pain, arm    3. Neurofibromatosis (HCC)    4. Bilateral brachial plexus lesions    5. Neck pain    6. Encounter for long-term (current) use of high-risk medication    Other orders  -     methocarbamol (ROBAXIN) 750 MG tablet; Take 1 tablet by mouth 3 (Three) Times a Day As Needed for Muscle Spasms.  Dispense: 45 tablet; Refill: 1  -     nortriptyline (PAMELOR) 10 MG " capsule; Take 1 capsule by mouth Every Night.  Dispense: 30 capsule; Refill: 1      --- Ok to continue hydrocodone every 4-6 hours prn post op pain.  He does not need refill today.  --- Refilled Methocarbamol  --- He is going to see how he does without the additional Gabapentin. We can resume temporarily if needed.    --- The urine drug screen confirmation from 11/19/2021 has been reviewed and the result is appropriate based on patient history and BIRGIT report  --- Trial Nortriptyline hs. Discussed medication with the patient.  Included in this discussion was the potential for side effects and adverse events.  Patient verbalized understanding and wished to proceed.  Prescription will be sent to pharmacy.  --- Follow-up 1 month       BIRGIT REPORT  As part of the patient's treatment plan, I am prescribing controlled substances. The patient has been made aware of appropriate use of such medications, including potential risk of somnolence, limited ability to drive and/or work safely, and the potential for dependence or overdose. It has also bee made clear that these medications are for use by this patient only, without concomitant use of alcohol or other substances unless prescribed.     Patient has completed prescribing agreement detailing terms of continued prescribing of controlled substances, including monitoring BIRGIT reports, urine drug screening, and pill counts if necessary. The patient is aware that inappropriate use will results in cessation of prescribing such medications.    As the clinician, I personally reviewed the BIRGIT from 4/21/2022 while the patient was in the office today.    History and physical exam exhibit continued safe and appropriate use of controlled substances.     Dictated utilizing Dragon dictation.     This document is intended for medical expert use only. Reading of this document by patients and/or patient's family without participating medical staff guidance may result in  misinterpretation and unintended morbidity.   Any interpretation of such data is the responsibility of the patient and/or family member responsible for the patient in concert with their primary or specialist providers, not to be left for sources of online searches such as [employer], [employer] or similar queries. Relying on these approaches to knowledge may result in misinterpretation, misguided goals of care and even death should patients or family members try recommendations outside of the realm of professional medical care in a supervised way.

## 2022-04-28 ENCOUNTER — TELEPHONE (OUTPATIENT)
Dept: INTERNAL MEDICINE | Facility: CLINIC | Age: 52
End: 2022-04-28

## 2022-04-28 NOTE — TELEPHONE ENCOUNTER
Caller: Isma De Luna    Relationship: Self    Best call back number:[phone]    What test/procedure requested: PRIOR AUTHORIZATION ON ULROIC MEDICATION    When is it needed: ASAP    Additional information or concerns: PATIENT IS CALLING TO CHECK ON THE STATUS OF THE FEBUXOSTAT (ULORIC) PRESCRIPTION

## 2022-05-04 ENCOUNTER — TELEPHONE (OUTPATIENT)
Dept: PAIN MEDICINE | Facility: CLINIC | Age: 52
End: 2022-05-04

## 2022-05-04 NOTE — TELEPHONE ENCOUNTER
LVM to get apt for 5/24 rescheduled. Provider has to be out of office that day.   Okay to reschedule

## 2022-05-16 RX ORDER — METHOCARBAMOL 750 MG/1
TABLET, FILM COATED ORAL
Qty: 45 TABLET | Refills: 1 | Status: SHIPPED | OUTPATIENT
Start: 2022-05-16 | End: 2022-07-14

## 2022-05-17 ENCOUNTER — OFFICE VISIT (OUTPATIENT)
Dept: PAIN MEDICINE | Facility: CLINIC | Age: 52
End: 2022-05-17

## 2022-05-17 VITALS
BODY MASS INDEX: 38.74 KG/M2 | WEIGHT: 255.6 LBS | HEART RATE: 90 BPM | SYSTOLIC BLOOD PRESSURE: 146 MMHG | OXYGEN SATURATION: 97 % | HEIGHT: 68 IN | DIASTOLIC BLOOD PRESSURE: 90 MMHG | RESPIRATION RATE: 18 BRPM | TEMPERATURE: 96.9 F

## 2022-05-17 DIAGNOSIS — Q85.00 NEUROFIBROMATOSIS: ICD-10-CM

## 2022-05-17 DIAGNOSIS — Z79.899 ENCOUNTER FOR LONG-TERM (CURRENT) USE OF HIGH-RISK MEDICATION: ICD-10-CM

## 2022-05-17 DIAGNOSIS — G54.0: ICD-10-CM

## 2022-05-17 DIAGNOSIS — G89.18 ACUTE POST-OPERATIVE PAIN: ICD-10-CM

## 2022-05-17 DIAGNOSIS — G89.4 CHRONIC PAIN SYNDROME: Primary | ICD-10-CM

## 2022-05-17 DIAGNOSIS — G54.0 BRACHIAL PLEXUS NEUROPATHY: ICD-10-CM

## 2022-05-17 PROCEDURE — 99214 OFFICE O/P EST MOD 30 MIN: CPT | Performed by: NURSE PRACTITIONER

## 2022-05-17 PROCEDURE — 80305 DRUG TEST PRSMV DIR OPT OBS: CPT | Performed by: NURSE PRACTITIONER

## 2022-05-17 RX ORDER — HYDROCODONE BITARTRATE AND ACETAMINOPHEN 10; 325 MG/1; MG/1
1 TABLET ORAL EVERY 4 HOURS PRN
Qty: 120 TABLET | Refills: 0 | Status: SHIPPED | OUTPATIENT
Start: 2022-05-17 | End: 2022-06-17 | Stop reason: SDUPTHER

## 2022-05-17 NOTE — PROGRESS NOTES
CHIEF COMPLAINT  Follow-up for neck pain.    Subjective   Isma De Luna is a 52 y.o. male  who presents for follow-up.  He has a history of chronic neck, shoulder, arm pain.  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 He is a patient at the [facility].  Had surgery on the left side a couple of years ago. He is post-op (4/13/2022) removal of right brachial plexus tumors.  overall he is improving. Still reporting pain, discomfort, and sensitivity of the right lower arm.    he is back to working about 6 hours a day from home.       Today his pain is 4/10VAS in severity. He typically takes hydrocodone 10/325 very sparingly, since surgery has been taking every 4 hours. He also continues with Horizant 600 mg BID, Flexeril PRN (2-3 times a month), Cymbalta 60 mg daily. Reports moderate pain reduction with regimen.  Having constipation, managed with the Miralax. He also feels like the pain medication is causing insomnia.  Has been using Flexeril which helps, but causes hangover until mid-day the next day.  Tried Nortriptyline 10 mg hs, did not help.      He states he continues to tolerate Horizant better then he tolerated Gabapentin.  He states he no longer has the somnolence issues which were previously interfering with work and driving.       Patient remained masked during entire encounter. No cough present. I donned a mask and eye protection throughout entire visit. Prior to donning mask and eye protection, hand hygiene was performed, as well as when it was doffed.  I was closer than 6 feet, but not for an extended period of time. No obvious exposure to any bodily fluids.    Neck Pain   This is a chronic problem. The current episode started more than 1 month ago. The problem occurs constantly. The problem has been waxing and waning. The pain is present in the midline and right side. The quality of the pain is described as aching, cramping and shooting. The pain is at a severity of  4/10. The symptoms are aggravated by twisting. Associated symptoms include numbness (right arm) and weakness (right arm). Pertinent negatives include no chest pain, fever or headaches. He has tried acetaminophen, bed rest, heat, ice, NSAIDs, oral narcotics, muscle relaxants, neck support and home exercises (hydrocodone) for the symptoms. The treatment provided moderate relief.   Arm Pain   The pain is present in the left hand, right hand, upper left arm and upper right arm. The quality of the pain is described as aching (tingling). The pain radiates to the right arm and right hand. The pain is at a severity of 4/10. The pain is mild. The pain has been fluctuating since the incident. Associated symptoms include numbness (right arm). Pertinent negatives include no chest pain.      PEG Assessment   What number best describes your pain on average in the past week?4  What number best describes how, during the past week, pain has interfered with your enjoyment of life?4  What number best describes how, during the past week, pain has interfered with your general activity? 5    The following portions of the patient's history were reviewed and updated as appropriate: allergies, current medications, past family history, past medical history, past social history, past surgical history and problem list.    Review of Systems   Constitutional: Positive for fatigue. Negative for fever.   HENT: Negative for congestion.    Eyes: Negative for visual disturbance.   Respiratory: Negative for shortness of breath.    Cardiovascular: Negative for chest pain.   Gastrointestinal: Positive for constipation.   Genitourinary: Negative for difficulty urinating.   Musculoskeletal: Positive for neck pain.   Neurological: Positive for weakness (right arm) and numbness (right arm). Negative for headaches.   Psychiatric/Behavioral: Positive for sleep disturbance. Negative for suicidal ideas. The patient is not nervous/anxious.      I have reviewed  "and confirmed the accuracy of the ROS as documented by the MA/LPN/RN Glenna WalterEMILIE    Vitals:    05/17/22 1438   BP: 146/90   Pulse: 90   Resp: 18   Temp: 96.9 °F (36.1 °C)   SpO2: 97%   Weight: 116 kg (255 lb 9.6 oz)   Height: 172.7 cm (68\")   PainSc:   4   PainLoc: Arm     Objective   Physical Exam  Vitals and nursing note reviewed.   Constitutional:       General: He is not in acute distress.     Appearance: Normal appearance. He is not ill-appearing.   Pulmonary:      Effort: Pulmonary effort is normal. No respiratory distress.   Musculoskeletal:      Right shoulder: Tenderness present.      Left shoulder: Tenderness present.      Right upper arm: Tenderness present.      Right hand: Tenderness present. Decreased strength.      Cervical back: Tenderness present.   Skin:     General: Skin is warm and dry.          Neurological:      Mental Status: He is alert and oriented to person, place, and time.      Gait: Gait normal.   Psychiatric:         Mood and Affect: Mood normal.         Behavior: Behavior normal.       Assessment & Plan   Diagnoses and all orders for this visit:    1. Chronic pain syndrome (Primary)  -     Urine Drug Screen Confirmation - Urine, Clean Catch; Future  -     POC Urine Drug Screen, Triage    2. Brachial plexus neuropathy  -     HYDROcodone-acetaminophen (NORCO)  MG per tablet; Take 1 tablet by mouth Every 4 (Four) Hours As Needed for Moderate Pain . dnf 5/28/2022  Dispense: 120 tablet; Refill: 0  -     Urine Drug Screen Confirmation - Urine, Clean Catch; Future  -     POC Urine Drug Screen, Triage    3. Neurofibromatosis (HCC)  -     Urine Drug Screen Confirmation - Urine, Clean Catch; Future  -     POC Urine Drug Screen, Triage    4. Bilateral brachial plexus lesions  -     Urine Drug Screen Confirmation - Urine, Clean Catch; Future  -     POC Urine Drug Screen, Triage    5. Encounter for long-term (current) use of high-risk medication  -     Urine Drug Screen " Confirmation - Urine, Clean Catch; Future  -     POC Urine Drug Screen, Triage    6. Acute post-operative pain  -     Urine Drug Screen Confirmation - Urine, Clean Catch; Future  -     POC Urine Drug Screen, Triage      --- Continue Hydrocodone at current dose for now. Refill sent to pharmacy, dated appropriately.    --- Will try doubling the Nortriptyline to see if this helps with sleep and nerve pain at night  --- Routine UDS in office today as part of monitoring requirements for controlled substances.  The specimen was viewed and the immunoassay result reviewed and is +OPI.  This specimen will be sent to [facility] laboratory for confirmation.     --- The patient signed an updated copy of the controlled substance agreement on 5/17/2022  --- Follow-up 1 month            BIRGIT REPORT  As part of the patient's treatment plan, I am prescribing controlled substances. The patient has been made aware of appropriate use of such medications, including potential risk of somnolence, limited ability to drive and/or work safely, and the potential for dependence or overdose. It has also bee made clear that these medications are for use by this patient only, without concomitant use of alcohol or other substances unless prescribed.     Patient has completed prescribing agreement detailing terms of continued prescribing of controlled substances, including monitoring BIRGIT reports, urine drug screening, and pill counts if necessary. The patient is aware that inappropriate use will results in cessation of prescribing such medications.    As the clinician, I personally reviewed the BIRGIT from 5/17/2022 while the patient was in the office today.    History and physical exam exhibit continued safe and appropriate use of controlled substances.     Dictated utilizing Dragon dictation.     This document is intended for medical expert use only. Reading of this document by patients and/or patient's family without participating medical  staff guidance may result in misinterpretation and unintended morbidity.   Any interpretation of such data is the responsibility of the patient and/or family member responsible for the patient in concert with their primary or specialist providers, not to be left for sources of online searches such as [employer], [employer] or similar queries. Relying on these approaches to knowledge may result in misinterpretation, misguided goals of care and even death should patients or family members try recommendations outside of the realm of professional medical care in a supervised way.

## 2022-05-25 RX ORDER — NORTRIPTYLINE HYDROCHLORIDE 10 MG/1
20 CAPSULE ORAL NIGHTLY
Qty: 60 CAPSULE | Refills: 1 | Status: SHIPPED | OUTPATIENT
Start: 2022-05-25 | End: 2022-07-14

## 2022-06-17 ENCOUNTER — OFFICE VISIT (OUTPATIENT)
Dept: PAIN MEDICINE | Facility: CLINIC | Age: 52
End: 2022-06-17

## 2022-06-17 VITALS
BODY MASS INDEX: 38.52 KG/M2 | RESPIRATION RATE: 18 BRPM | HEIGHT: 68 IN | DIASTOLIC BLOOD PRESSURE: 90 MMHG | HEART RATE: 90 BPM | OXYGEN SATURATION: 97 % | TEMPERATURE: 96.9 F | WEIGHT: 254.2 LBS | SYSTOLIC BLOOD PRESSURE: 132 MMHG

## 2022-06-17 DIAGNOSIS — Z79.899 ENCOUNTER FOR LONG-TERM (CURRENT) USE OF HIGH-RISK MEDICATION: ICD-10-CM

## 2022-06-17 DIAGNOSIS — Q85.00 NEUROFIBROMATOSIS: ICD-10-CM

## 2022-06-17 DIAGNOSIS — G54.0 BRACHIAL PLEXUS NEUROPATHY: ICD-10-CM

## 2022-06-17 DIAGNOSIS — G89.4 CHRONIC PAIN SYNDROME: Primary | ICD-10-CM

## 2022-06-17 DIAGNOSIS — G89.18 ACUTE POST-OPERATIVE PAIN: ICD-10-CM

## 2022-06-17 PROCEDURE — 99214 OFFICE O/P EST MOD 30 MIN: CPT | Performed by: NURSE PRACTITIONER

## 2022-06-17 RX ORDER — HYDROCODONE BITARTRATE AND ACETAMINOPHEN 10; 325 MG/1; MG/1
1 TABLET ORAL EVERY 4 HOURS PRN
Qty: 120 TABLET | Refills: 0 | Status: SHIPPED | OUTPATIENT
Start: 2022-06-17 | End: 2022-07-25 | Stop reason: SDUPTHER

## 2022-06-17 RX ORDER — METHYLPREDNISOLONE 4 MG/1
TABLET ORAL
Qty: 21 TABLET | Refills: 0 | Status: SHIPPED | OUTPATIENT
Start: 2022-06-17 | End: 2022-07-14

## 2022-06-17 RX ORDER — GABAPENTIN 300 MG/1
300 CAPSULE ORAL 3 TIMES DAILY
COMMUNITY
Start: 2022-06-13 | End: 2022-07-14

## 2022-06-17 NOTE — PROGRESS NOTES
CHIEF COMPLAINT  Follow-up for neck pain.    Subjective   Isma De Luna is a 52 y.o. male  who presents for follow-up.  He has a history of neck pain, arm pain.  Has a recent setback, fell and landed on right arm.  Asks about a steroid.  States these have helped in the past with similar flares.      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 He is a patient at the [facility].  Had surgery on the left side a couple of years ago. He is post-op (4/13/2022) removal of right brachial plexus tumors.  overall he is improving. Still reporting pain, discomfort, and sensitivity of the right lower arm.    he is back to working about 6 hours a day from home.      Today his pain is 4/10VAS in severity. He typically takes hydrocodone 10/325 very sparingly, since surgery has been taking every 4-6 hours (has tried backing down over the last few weeks), Methocarbamol 3/day since surgery as well (flexeril prior to surgery). He also continues with Horizant 600 mg BID, Cymbalta 60 mg daily. Reports moderate pain reduction with regimen.  Having constipation, managed with the Miralax. Nortriptyline 20 mg hs helping with sleep and neuropathic pain as well.      Patient remained masked during entire encounter. No cough present. I donned a mask and eye protection throughout entire visit. Prior to donning mask and eye protection, hand hygiene was performed, as well as when it was doffed.  I was closer than 6 feet, but not for an extended period of time. No obvious exposure to any bodily fluids.    Neck Pain   This is a chronic problem. The current episode started more than 1 month ago. The problem occurs constantly. The problem has been waxing and waning. The pain is present in the midline and right side. The quality of the pain is described as aching, cramping and shooting. The pain is at a severity of 4/10. The symptoms are aggravated by twisting. Associated symptoms include numbness and weakness (right  hand). Pertinent negatives include no chest pain, fever or headaches. He has tried acetaminophen, bed rest, heat, ice, NSAIDs, oral narcotics, muscle relaxants, neck support and home exercises (hydrocodone) for the symptoms. The treatment provided moderate relief.   Arm Pain   The pain is present in the left hand, right hand, upper left arm and upper right arm. The quality of the pain is described as aching (tingling). The pain radiates to the right arm and right hand. The pain is at a severity of 4/10. The pain is mild. The pain has been fluctuating since the incident. Associated symptoms include numbness. Pertinent negatives include no chest pain.      PEG Assessment   What number best describes your pain on average in the past week?6  What number best describes how, during the past week, pain has interfered with your enjoyment of life?4  What number best describes how, during the past week, pain has interfered with your general activity?  4    The following portions of the patient's history were reviewed and updated as appropriate: allergies, current medications, past family history, past medical history, past social history, past surgical history and problem list.    Review of Systems   Constitutional: Positive for fatigue. Negative for fever.   HENT: Negative for congestion.    Eyes: Negative for visual disturbance.   Respiratory: Negative for shortness of breath.    Cardiovascular: Negative for chest pain.   Gastrointestinal: Positive for constipation.   Genitourinary: Negative for difficulty urinating.   Musculoskeletal: Positive for neck pain.   Neurological: Positive for weakness (right hand) and numbness. Negative for headaches.   Psychiatric/Behavioral: Positive for sleep disturbance. Negative for suicidal ideas. The patient is not nervous/anxious.      I have reviewed and confirmed the accuracy of the ROS as documented by the MA/EMRE/RN EMILIE Shrestha    Vitals:    06/17/22 1449   BP: 132/90  "  Pulse: 90   Resp: 18   Temp: 96.9 °F (36.1 °C)   SpO2: 97%   Weight: 115 kg (254 lb 3.2 oz)   Height: 172.7 cm (68\")   PainSc:   4   PainLoc: Arm     Objective   Physical Exam  Vitals and nursing note reviewed.   Constitutional:       General: He is not in acute distress.     Appearance: Normal appearance. He is not ill-appearing.   Pulmonary:      Effort: Pulmonary effort is normal. No respiratory distress.   Musculoskeletal:      Right shoulder: Tenderness present.      Left shoulder: Tenderness present.      Right upper arm: Tenderness present.      Right hand: Tenderness present. Decreased strength.      Cervical back: Tenderness present.   Skin:     General: Skin is warm and dry.          Neurological:      Mental Status: He is alert and oriented to person, place, and time.      Gait: Gait normal.   Psychiatric:         Mood and Affect: Mood normal.         Behavior: Behavior normal.       Assessment & Plan   Diagnoses and all orders for this visit:    1. Chronic pain syndrome (Primary)    2. Neurofibromatosis (HCC)    3. Brachial plexus neuropathy  -     HYDROcodone-acetaminophen (NORCO)  MG per tablet; Take 1 tablet by mouth Every 4 (Four) Hours As Needed for Moderate Pain .  Dispense: 120 tablet; Refill: 0    4. Acute post-operative pain    5. Encounter for long-term (current) use of high-risk medication    Other orders  -     methylPREDNISolone (MEDROL) 4 MG dose pack; Take as directed on package instructions.  Dispense: 21 tablet; Refill: 0      --- Refill Hydrocodone. Continue with increased dose for acute post op pain. Patient appears stable with current regimen. No adverse effects. Regarding continuation of opioids, there is no evidence of aberrant behavior or any red flags.  The patient continues with appropriate response to opioid therapy. ADL's remain intact by self.   --- The urine drug screen confirmation from 5/17/2022 has been reviewed and the result is appropriate based on patient " history and BIRGIT report  --- The patient signed an updated copy of the controlled substance agreement on 5/17/2022  --- Follow-up 1 month        BIRGIT REPORT  As part of the patient's treatment plan, I am prescribing controlled substances. The patient has been made aware of appropriate use of such medications, including potential risk of somnolence, limited ability to drive and/or work safely, and the potential for dependence or overdose. It has also been made clear that these medications are for use by this patient only, without concomitant use of alcohol or other substances unless prescribed.     Patient has completed prescribing agreement detailing terms of continued prescribing of controlled substances, including monitoring BIRGIT reports, urine drug screening, and pill counts if necessary. The patient is aware that inappropriate use will results in cessation of prescribing such medications.    As the clinician, I personally reviewed the BIRGIT from 6/17/2022 while the patient was in the office today.    History and physical exam exhibit continued safe and appropriate use of controlled substances.     Dictated utilizing Dragon dictation.     This document is intended for medical expert use only. Reading of this document by patients and/or patient's family without participating medical staff guidance may result in misinterpretation and unintended morbidity.   Any interpretation of such data is the responsibility of the patient and/or family member responsible for the patient in concert with their primary or specialist providers, not to be left for sources of online searches such as [employer], [employer] or similar queries. Relying on these approaches to knowledge may result in misinterpretation, misguided goals of care and even death should patients or family members try recommendations outside of the realm of professional medical care in a supervised way.

## 2022-06-22 DIAGNOSIS — K21.9 GASTROESOPHAGEAL REFLUX DISEASE WITHOUT ESOPHAGITIS: ICD-10-CM

## 2022-06-23 RX ORDER — OMEPRAZOLE 40 MG/1
40 CAPSULE, DELAYED RELEASE ORAL DAILY
Qty: 90 CAPSULE | Refills: 1 | Status: SHIPPED | OUTPATIENT
Start: 2022-06-23 | End: 2022-10-31 | Stop reason: SDUPTHER

## 2022-06-30 ENCOUNTER — OFFICE VISIT (OUTPATIENT)
Dept: INTERNAL MEDICINE | Facility: CLINIC | Age: 52
End: 2022-06-30

## 2022-06-30 VITALS
OXYGEN SATURATION: 96 % | BODY MASS INDEX: 38.65 KG/M2 | RESPIRATION RATE: 16 BRPM | WEIGHT: 255 LBS | DIASTOLIC BLOOD PRESSURE: 78 MMHG | TEMPERATURE: 98 F | HEIGHT: 68 IN | SYSTOLIC BLOOD PRESSURE: 122 MMHG | HEART RATE: 80 BPM

## 2022-06-30 DIAGNOSIS — I10 PRIMARY HYPERTENSION: Primary | ICD-10-CM

## 2022-06-30 PROCEDURE — 99213 OFFICE O/P EST LOW 20 MIN: CPT | Performed by: FAMILY MEDICINE

## 2022-06-30 NOTE — ASSESSMENT & PLAN NOTE
Hypertension is chronic, stable .  Continue current treatment regimen.  Dietary sodium restriction.  Continue current medications.  Ambulatory blood pressure monitoring.  Blood pressure will be reassessed at the next regular appointment.   Problem: Patient Care Overview  Goal: Plan of Care Review  Outcome: Ongoing (interventions implemented as appropriate)  Mother and patient updated on patient status and plan of care at bedside. Questions and concerns addressed. No further questions at this time. Patient remained on room air. NAD noted. Tmax 100.5. Tylenol x1. No other PRNs required. Continuing IV Cefepime. Vancomycin trough drawn this AM. Blood cultures NGTD. Will have ECHO this AM. As long as patient remains stable from respiratory standpoint, will transfer back to [facility] today. Will continue to monitor.

## 2022-06-30 NOTE — PROGRESS NOTES
"    Chief Complaint  Hypertension    Subjective    History of Present Illness {CC  Problem List  Visit  Diagnosis   Encounters  Notes  Medications  Labs  Result Review Imaging  Media :23}     Isma De Luna presents to [facility] for   History of Present Illness   51 yo male present for follow up visit. He states he is doing well  States had a fall early part of June which flared up pain in arm from surgery.  He is taking medication as prescribed, following with pain management.    Incision healing well.       Social History     Socioeconomic History   • Marital status: Single   Tobacco Use   • Smoking status: Never Smoker   • Smokeless tobacco: Never Used   Substance and Sexual Activity   • Alcohol use: Never   • Drug use: Yes     Types: Hydrocodone, Other     Comment: Prescribed NORCO/Gabapentin   • Sexual activity: Yes     Partners: Female      Objective     Vital Signs:   /78 (BP Location: Left arm, Patient Position: Sitting, Cuff Size: Large Adult)   Pulse 80   Temp 98 °F (36.7 °C) (Temporal)   Resp 16   Ht 172.7 cm (67.99\")   Wt 116 kg (255 lb)   SpO2 96%   BMI 38.78 kg/m²   Physical Exam  Constitutional:       General: He is not in acute distress.     Appearance: He is not ill-appearing.   HENT:      Head: Normocephalic.   Cardiovascular:      Rate and Rhythm: Regular rhythm.      Heart sounds: Normal heart sounds.   Pulmonary:      Effort: No respiratory distress.      Breath sounds: Normal breath sounds. No wheezing.   Skin:     Comments: R chest wall incision healing well   Neurological:      Mental Status: He is alert.   Psychiatric:         Mood and Affect: Mood normal.         Thought Content: Thought content normal.        Result Review  Data Reviewed:{ Labs  Result Review  Imaging  Med Tab  Media :23}   The following data was reviewed by: Alia Mancia MD on 06/30/2022  Lab Results - Last 18 Months   Lab Units 04/14/22  0447 03/29/22  1516 " 03/10/22  0828 11/23/21  1017 06/25/21  1622 03/09/21  0918   GLUCOSE mg/dL  --   --   --   --   --  102*   BUN mg/dL  --  15  --  17 17 15   CREATININE mg/dL  --  1.29*  --  1.49* 1.37* 1.30   EGFR IF NONAFRICN AM mL/min/1.73  --   --   --  54* 55*  --    EGFR IF AFRICN AM mL/min/1.73  --   --   --  62 66  --    SODIUM mmol/L  --  143  --  142 139 141   POTASSIUM mmol/L  --  4.0  --  4.9 4.2 4.4   CHLORIDE mmol/L  --  103  --  103 102 107   CALCIUM mg/dL  --  9.7  --  10.0 9.9 9.0   ALBUMIN g/dL  --  4.8  --  4.6 4.80 4.5   BILIRUBIN mg/dL  --  0.5  --  0.4 0.4 0.5   ALK PHOS IU/L  --  76  --  75 80 66   AST (SGOT) IU/L  --  25  --  32 24 32   ALT (SGPT) IU/L  --  22  --  36 22 27   CHOLESTEROL mg/dL  --  206*  --  208* 181  --    TRIGLYCERIDES mg/dL  --  160*  --  250* 136  --    HDL CHOL mg/dL  --  43  --  38* 39*  --    VLDL CHOLESTEROL FRANCIS mg/dL  --  29  --  44* 24  --    LDL CHOL mg/dL  --  134*  --  126* 118*  --    WBC x10(9)/L 12.8* 6.9 5.78  --  6.10 6.21   RBC x10(12)/L 3.81* 5.09 4.92  --  4.80 4.78   HEMATOCRIT % 32.5* 43.2 41.3  --  40.7 41.0   MCV fL 85.3 85 83.9  --  84.8 85.8   MCH pg  --  28.1 28.0  --  28.3 29.1   VIT D 25 HYDROXY ng/mL  --  60.0  --   --   --   --    URIC ACID mg/dL  --  3.9  --   --   --   --               Current Outpatient Medications:   •  amLODIPine (NORVASC) 10 MG tablet, Take 1 tablet by mouth Daily., Disp: 90 tablet, Rfl: 1  •  B Complex-Biotin-FA (HM VITAMIN B100 COMPLEX PO), Take 1 tablet by mouth daily., Disp: , Rfl:   •  cetirizine (zyrTEC) 10 MG tablet, , Disp: , Rfl:   •  Cholecalciferol (VITAMIN D3) 2000 units tablet, Take 1,000 mg by mouth Daily., Disp: , Rfl:   •  DULoxetine (CYMBALTA) 60 MG capsule, TK 1 C PO D, Disp: , Rfl:   •  febuxostat (ULORIC) 40 MG tablet, TAKE 1 TABLET BY MOUTH DAILY INSTEAD OF ALLOPURINOL, Disp: 90 tablet, Rfl: 1  •  ferrous sulfate 325 (65 FE) MG tablet, Take 45 mg by mouth Daily., Disp: , Rfl:   •  fluticasone (VERAMYST) 27.5  MCG/SPRAY nasal spray, 2 sprays into the nostril(s) as directed by provider Daily., Disp: , Rfl:   •  Horizant 600 MG tablet controlled-release, TAKE 600 MG BY MOUTH 2 (TWO) TIMES A DAY., Disp: 60 tablet, Rfl: 5  •  HYDROcodone-acetaminophen (NORCO)  MG per tablet, Take 1 tablet by mouth Every 4 (Four) Hours As Needed for Moderate Pain ., Disp: 120 tablet, Rfl: 0  •  losartan (COZAAR) 100 MG tablet, TAKE 1 TABLET BY MOUTH DAILY, Disp: 90 tablet, Rfl: 1  •  Multiple Vitamin (MULTI-VITAMIN DAILY PO), Take  by mouth Daily., Disp: , Rfl:   •  NON FORMULARY, Whole Body Herbal Complex, Disp: , Rfl:   •  omeprazole (priLOSEC) 40 MG capsule, TAKE 1 CAPSULE BY MOUTH DAILY, Disp: 90 capsule, Rfl: 1  •  Pyridoxine HCl (Vitamin B6) 100 MG tablet, Take  by mouth., Disp: , Rfl:   •  vitamin B-12 (CYANOCOBALAMIN) 1000 MCG tablet, Take 1,000 mcg by mouth Daily., Disp: , Rfl:   •  gabapentin (NEURONTIN) 300 MG capsule, Take 300 mg by mouth 3 (Three) Times a Day., Disp: , Rfl:   •  methocarbamol (ROBAXIN) 750 MG tablet, TAKE 1 TABLET BY MOUTH THREE TIMES DAILY AS NEEDED FOR MUSCLE SPASMS, Disp: 45 tablet, Rfl: 1  •  methylPREDNISolone (MEDROL) 4 MG dose pack, Take as directed on package instructions., Disp: 21 tablet, Rfl: 0  •  nortriptyline (PAMELOR) 10 MG capsule, Take 2 capsules by mouth Every Night., Disp: 60 capsule, Rfl: 1      Assessment and Plan {CC Problem List  Visit Diagnosis  ROS  Review (Popup)  Health Maintenance  Quality  BestPractice  Medications  SmartSets  SnapShot Encounters  Media :23}   Problem List Items Addressed This Visit        Cardiac and Vasculature    Hypertension - Primary    Current Assessment & Plan     Hypertension is chronic, stable .  Continue current treatment regimen.  Dietary sodium restriction.  Continue current medications.  Ambulatory blood pressure monitoring.  Blood pressure will be reassessed at the next regular appointment.                 Follow Up   Return in about 3  months (around 9/30/2022) for Annual physical fasting labs.  Patient was given instructions and counseling regarding his condition or for health maintenance advice. Please see specific information pulled into the AVS if appropriate.    EpicAct:[email or web address]_ORDERS,RunParams:STARTUPTYPE=FOLLOW    MR_[email or web address]_DISCHARGE

## 2022-07-14 ENCOUNTER — OFFICE VISIT (OUTPATIENT)
Dept: PAIN MEDICINE | Facility: CLINIC | Age: 52
End: 2022-07-14

## 2022-07-14 VITALS
WEIGHT: 251.8 LBS | HEART RATE: 98 BPM | SYSTOLIC BLOOD PRESSURE: 127 MMHG | RESPIRATION RATE: 18 BRPM | TEMPERATURE: 97.3 F | DIASTOLIC BLOOD PRESSURE: 83 MMHG | OXYGEN SATURATION: 96 % | HEIGHT: 68 IN | BODY MASS INDEX: 38.16 KG/M2

## 2022-07-14 DIAGNOSIS — G54.0 BRACHIAL PLEXUS NEUROPATHY: ICD-10-CM

## 2022-07-14 DIAGNOSIS — Z79.899 ENCOUNTER FOR LONG-TERM (CURRENT) USE OF HIGH-RISK MEDICATION: ICD-10-CM

## 2022-07-14 DIAGNOSIS — M54.2 NECK PAIN: ICD-10-CM

## 2022-07-14 DIAGNOSIS — G89.18 ACUTE POST-OPERATIVE PAIN: ICD-10-CM

## 2022-07-14 DIAGNOSIS — G89.4 CHRONIC PAIN SYNDROME: Primary | ICD-10-CM

## 2022-07-14 DIAGNOSIS — Q85.00 NEUROFIBROMATOSIS: ICD-10-CM

## 2022-07-14 PROCEDURE — 99214 OFFICE O/P EST MOD 30 MIN: CPT | Performed by: NURSE PRACTITIONER

## 2022-07-14 NOTE — PROGRESS NOTES
CHIEF COMPLAINT  Follow-up for neck pain.    Subjective   Isma De Luna is a 52 y.o. male  who presents for follow-up.  He has a history of chronic neck and shoulder pain.    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 He is a patient at the [facility].  Had surgery on the left side a couple of years ago. He is post-op (4/13/2022) removal of right brachial plexus tumors.  overall he is improving. Still reporting pain, discomfort, and sensitivity of the right lower arm.    he is back to working about 6 hours a day from home.       Today his pain is 4/10VAS in severity. He typically takes hydrocodone 10/325 very sparingly, since surgery has been taking more often, over the past month has been able to reduce down to about 3/day. He also continues with Horizant 600 mg BID, Cymbalta 60 mg daily. Reports moderate pain reduction with regimen.  Having constipation, managed with the Miralax.     Patient remained masked during entire encounter. No cough present. I donned a mask and eye protection throughout entire visit. Prior to donning mask and eye protection, hand hygiene was performed, as well as when it was doffed.  I was closer than 6 feet, but not for an extended period of time. No obvious exposure to any bodily fluids.    Neck Pain   This is a chronic problem. The current episode started more than 1 month ago. The problem occurs constantly. The problem has been waxing and waning. The pain is present in the midline and right side. The quality of the pain is described as aching, cramping and shooting. The pain is at a severity of 4/10. The symptoms are aggravated by twisting. Associated symptoms include numbness and weakness. Pertinent negatives include no chest pain, fever or headaches. He has tried acetaminophen, bed rest, heat, ice, NSAIDs, oral narcotics, muscle relaxants, neck support and home exercises (hydrocodone) for the symptoms. The treatment provided moderate relief.   Arm  "Pain   The pain is present in the left hand, right hand, upper left arm and upper right arm. The quality of the pain is described as aching (tingling). The pain radiates to the right arm and right hand. The pain is at a severity of 4/10. The pain is mild. The pain has been fluctuating since the incident. Associated symptoms include numbness. Pertinent negatives include no chest pain.      PEG Assessment   What number best describes your pain on average in the past week?2  What number best describes how, during the past week, pain has interfered with your enjoyment of life?1  What number best describes how, during the past week, pain has interfered with your general activity?  3    The following portions of the patient's history were reviewed and updated as appropriate: allergies, current medications, past family history, past medical history, past social history, past surgical history and problem list.    Review of Systems   Constitutional: Negative for fatigue and fever.   HENT: Negative for congestion.    Eyes: Negative for visual disturbance.   Respiratory: Negative for shortness of breath.    Cardiovascular: Negative for chest pain.   Gastrointestinal: Positive for constipation.   Genitourinary: Negative for difficulty urinating.   Musculoskeletal: Positive for neck pain.   Neurological: Positive for weakness and numbness. Negative for headaches.   Psychiatric/Behavioral: Negative for sleep disturbance and suicidal ideas. The patient is not nervous/anxious.      I have reviewed and confirmed the accuracy of the ROS as documented by the MA/LPN/RN EMILIE Shrestha    Vitals:    07/14/22 0840   BP: 127/83   Pulse: 98   Resp: 18   Temp: 97.3 °F (36.3 °C)   SpO2: 96%   Weight: 114 kg (251 lb 12.8 oz)   Height: 172.7 cm (67.99\")   PainSc:   4   PainLoc: Arm       Objective   Physical Exam  Vitals and nursing note reviewed.   Constitutional:       General: He is not in acute distress.     Appearance: Normal " appearance. He is not ill-appearing.   Pulmonary:      Effort: Pulmonary effort is normal. No respiratory distress.   Musculoskeletal:      Right shoulder: Tenderness present.      Left shoulder: Tenderness present.      Right upper arm: Tenderness present.      Right hand: Tenderness present. Decreased strength.      Cervical back: Tenderness present.   Skin:     General: Skin is warm and dry.   Neurological:      Mental Status: He is alert and oriented to person, place, and time.      Gait: Gait normal.   Psychiatric:         Mood and Affect: Mood normal.         Behavior: Behavior normal.       Assessment & Plan   Diagnoses and all orders for this visit:    1. Chronic pain syndrome (Primary)    2. Neurofibromatosis (HCC)    3. Brachial plexus neuropathy    4. Acute post-operative pain    5. Encounter for long-term (current) use of high-risk medication    6. Neck pain      --- Continue Hydrocodone, plan to reduce to #90 at next refill. Patient appears stable with current regimen. No adverse effects. Regarding continuation of opioids, there is no evidence of aberrant behavior or any red flags.  The patient continues with appropriate response to opioid therapy. ADL's remain intact by self.   --- The urine drug screen confirmation from 5/17/2022 has been reviewed and the result is appropriate based on patient history and BIRGIT report  --- The patient signed an updated copy of the controlled substance agreement on 5/17/2022  --- Follow-up 6-8 weeks/sooner if needed        BIRGIT REPORT  As part of the patient's treatment plan, I am prescribing controlled substances. The patient has been made aware of appropriate use of such medications, including potential risk of somnolence, limited ability to drive and/or work safely, and the potential for dependence or overdose. It has also been made clear that these medications are for use by this patient only, without concomitant use of alcohol or other substances unless  prescribed.     Patient has completed prescribing agreement detailing terms of continued prescribing of controlled substances, including monitoring BIRGIT reports, urine drug screening, and pill counts if necessary. The patient is aware that inappropriate use will results in cessation of prescribing such medications.    As the clinician, I personally reviewed the BIRGIT from 7/14/2022 while the patient was in the office today.    History and physical exam exhibit continued safe and appropriate use of controlled substances.     Dictated utilizing Dragon dictation.     This document is intended for medical expert use only. Reading of this document by patients and/or patient's family without participating medical staff guidance may result in misinterpretation and unintended morbidity.   Any interpretation of such data is the responsibility of the patient and/or family member responsible for the patient in concert with their primary or specialist providers, not to be left for sources of online searches such as [employer], [employer] or similar queries. Relying on these approaches to knowledge may result in misinterpretation, misguided goals of care and even death should patients or family members try recommendations outside of the realm of professional medical care in a supervised way.

## 2022-07-21 NOTE — PROGRESS NOTES
CHIEF COMPLAINT  Shoulder pain is unchanged since last visit. Patient is having surgery on Monday.    Subjective   Isma De Luna is a 48 y.o. male  who presents to the office for follow-up.He has a history of shoulder pain, neurofibromatosis, brachial plexus neuropathy.    Complains of pain in his neck and arms. Today his pain is 0/10VAS. Continues with Hydrocodone 10/325 2-4/day (goes days without, has good and bad days) and gabapentin 800 mg TID and cymbalta 30 mg and Flexeril. Does have occasional itching with the regimen. Does not drive with medication. The regimen helps decrease his pain by 90%. ADL's by self.    Having worsening bilateral arm/shoulder area pain.  Saw neurosurgeon at [facility] Dr. Enrico Holman (plastics) who does a lot of work with brachial plexus issues.  She is scheduled for carpal tunnel release and removal of two tumors (one in the neck and one supraclavicular) on Monday.  If this procedure goes will will consider right sided surgery as well.      Neck Pain    This is a chronic problem. The current episode started more than 1 month ago. The problem occurs constantly. The problem has been waxing and waning (unchanged since last office visit). Associated with: NF. The pain is present in the midline and right side. The quality of the pain is described as aching, cramping and shooting. The pain is at a severity of 0/10 (pain is not flared at this time). The pain is mild. The symptoms are aggravated by twisting. Pertinent negatives include no chest pain, fever, headaches, numbness or weakness. He has tried acetaminophen, bed rest, heat, ice, NSAIDs, oral narcotics, muscle relaxants, neck support and home exercises (hydrocodone) for the symptoms. The treatment provided moderate relief.      PEG Assessment   What number best describes your pain on average in the past week?1  What number best describes how, during the past week, pain has interfered with your enjoyment of life?0  What number best  Patient called, following up on the status of rx. Per patient saw NP Maddie Wiseman on 6/21 for the rx. Please reach out to patient for updates.    "describes how, during the past week, pain has interfered with your general activity?  0    The following portions of the patient's history were reviewed and updated as appropriate: allergies, current medications, past family history, past medical history, past social history, past surgical history and problem list.    Review of Systems   Constitutional: Negative for chills and fever.   Respiratory: Negative for shortness of breath.    Cardiovascular: Negative for chest pain.   Gastrointestinal: Negative for constipation, diarrhea, nausea and vomiting.   Genitourinary: Negative for difficulty urinating and dysuria.   Musculoskeletal: Positive for neck pain.        Shoulder pain   Neurological: Negative for dizziness, weakness, light-headedness, numbness and headaches.   Psychiatric/Behavioral: Negative for confusion, hallucinations, self-injury, sleep disturbance and suicidal ideas. The patient is not nervous/anxious.        Vitals:    03/22/18 1539   BP: 149/98   Pulse: 70   Resp: 18   Temp: 98.4 °F (36.9 °C)   SpO2: 97%   Weight: 111 kg (245 lb)   Height: 172.7 cm (67.99\")   PainSc: 0-No pain   PainLoc: Shoulder         Objective   Physical Exam   Constitutional: He is oriented to person, place, and time. Vital signs are normal. He appears well-developed and well-nourished.  Non-toxic appearance.   HENT:   Head: Normocephalic and atraumatic.   Nose: Nose normal.   Eyes: Conjunctivae and lids are normal.   Cardiovascular: Normal rate.    Pulmonary/Chest: Effort normal. No respiratory distress.   Abdominal: Normal appearance.   Musculoskeletal:        Right shoulder: He exhibits tenderness.        Left shoulder: He exhibits tenderness.        Cervical back: He exhibits tenderness.        Neurological: He is alert and oriented to person, place, and time. No cranial nerve deficit. Gait normal.   Psychiatric: He has a normal mood and affect. His behavior is normal.   Nursing note and vitals reviewed.    Assessment/Plan "   Isma was seen today for shoulder pain.    Diagnoses and all orders for this visit:    Other chronic pain    Neurofibromatosis    Brachial plexus neuropathy    Chronic shoulder pain, unspecified laterality    Encounter for long-term (current) use of high-risk medication      --- Continue hydrocodone, refill not needed today. Patient appears stable with current regimen. No adverse effects. Regarding continuation of opioids, there is no evidence of aberrant behavior or any red flags.  The patient continues with appropriate response to opioid therapy. ADL's remain intact by self.   --- The urine drug screen confirmation from 11-6-17 has been reviewed and the result is appropriate based on patient history and BIRGIT report  --- Ok for acute post operative pain management per Dr. Holman   --- Follow-up 3 months          BIRGIT REPORT    As part of the patient's treatment plan, I am prescribing controlled substances. The patient has been made aware of appropriate use of such medications, including potential risk of somnolence, limited ability to drive and/or work safely, and the potential for dependence or overdose. It has also bee made clear that these medications are for use by this patient only, without concomitant use of alcohol or other substances unless prescribed.     Patient has completed prescribing agreement detailing terms of continued prescribing of controlled substances, including monitoring BIRGIT reports, urine drug screening, and pill counts if necessary. The patient is aware that inappropriate use will results in cessation of prescribing such medications.    BIRGIT report has been reviewed and scanned into the patient's chart.    Date of last BIRGIT : 3/21/18    History and physical exam exhibit continued safe and appropriate use of controlled substances.    EMR Dragon/Transcription disclaimer:   Much of this encounter note is an electronic transcription/translation of spoken language to printed text.  The electronic translation of spoken language may permit erroneous, or at times, nonsensical words or phrases to be inadvertently transcribed; Although I have reviewed the note for such errors, some may still exist.      Initial visit with EMILIE Shrestha

## 2022-07-25 DIAGNOSIS — G54.0 BRACHIAL PLEXUS NEUROPATHY: ICD-10-CM

## 2022-07-25 NOTE — TELEPHONE ENCOUNTER
Caller: RANDEE GIBSON     Relationship: PATIENT     Best call back number: [phone]    Requested Prescriptions:   Requested Prescriptions     Pending Prescriptions Disp Refills   • HYDROcodone-acetaminophen (NORCO)  MG per tablet 120 tablet 0     Sig: Take 1 tablet by mouth Every 4 (Four) Hours As Needed for Moderate Pain .        Pharmacy where request should be sent:  [facility], [address] [place] [address]    PHONE [phone]  [phone]        Does the patient have less than a 3 day supply:  [] Yes  [x] No    Christi Gómez Rep   07/25/22 16:00 EDT

## 2022-07-26 RX ORDER — HYDROCODONE BITARTRATE AND ACETAMINOPHEN 10; 325 MG/1; MG/1
1 TABLET ORAL EVERY 4 HOURS PRN
Qty: 120 TABLET | Refills: 0 | Status: SHIPPED | OUTPATIENT
Start: 2022-07-26 | End: 2022-12-02 | Stop reason: SDUPTHER

## 2022-08-26 ENCOUNTER — OFFICE VISIT (OUTPATIENT)
Dept: PAIN MEDICINE | Facility: CLINIC | Age: 52
End: 2022-08-26

## 2022-08-26 VITALS
TEMPERATURE: 98 F | OXYGEN SATURATION: 96 % | HEART RATE: 74 BPM | BODY MASS INDEX: 37.95 KG/M2 | HEIGHT: 68 IN | WEIGHT: 250.4 LBS | SYSTOLIC BLOOD PRESSURE: 145 MMHG | RESPIRATION RATE: 18 BRPM | DIASTOLIC BLOOD PRESSURE: 85 MMHG

## 2022-08-26 DIAGNOSIS — Q85.00 NEUROFIBROMATOSIS: ICD-10-CM

## 2022-08-26 DIAGNOSIS — G54.0 BRACHIAL PLEXUS NEUROPATHY: ICD-10-CM

## 2022-08-26 DIAGNOSIS — M54.2 NECK PAIN: ICD-10-CM

## 2022-08-26 DIAGNOSIS — G89.18 ACUTE POST-OPERATIVE PAIN: ICD-10-CM

## 2022-08-26 DIAGNOSIS — G89.4 CHRONIC PAIN SYNDROME: Primary | ICD-10-CM

## 2022-08-26 PROCEDURE — 99214 OFFICE O/P EST MOD 30 MIN: CPT | Performed by: NURSE PRACTITIONER

## 2022-08-26 NOTE — PROGRESS NOTES
CHIEF COMPLAINT  Follow-up for neck pain.    Subjective   Isma De Luna is a 52 y.o. male  who presents for follow-up.  He has a history of neck pain, shoulder pain.    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 He is a patient at the [facility].  Had surgery on the left side a couple of years ago. He is post-op (4/13/2022) removal of right brachial plexus tumors.  Overall he is improving significantly. Has for the most part discontinued using pain medication.       Today his pain is 4/10VAS in severity. Taking hydrocodone 10/325 which he is back to using very sparingly. He also continues with Horizant 600 mg BID, Cymbalta 60 mg daily. Reports moderate pain reduction with regimen.  Having constipation, managed with the Miralax.     Neck Pain   This is a chronic problem. The current episode started more than 1 month ago. The problem occurs constantly. The problem has been waxing and waning. The pain is present in the midline and right side. The quality of the pain is described as aching, cramping and shooting. The pain is at a severity of 1/10. The symptoms are aggravated by twisting. Associated symptoms include numbness. Pertinent negatives include no chest pain, fever, headaches or weakness. He has tried acetaminophen, bed rest, heat, ice, NSAIDs, oral narcotics, muscle relaxants, neck support and home exercises (hydrocodone) for the symptoms. The treatment provided moderate relief.   Arm Pain   The pain is present in the left hand, right hand, upper left arm and upper right arm. The quality of the pain is described as aching (tingling). The pain radiates to the right arm and right hand. The pain is at a severity of 1/10. The pain is mild. The pain has been fluctuating since the incident. Associated symptoms include numbness. Pertinent negatives include no chest pain.      PEG Assessment   What number best describes your pain on average in the past week?1  What number best  "describes how, during the past week, pain has interfered with your enjoyment of life?0  What number best describes how, during the past week, pain has interfered with your general activity?  0    The following portions of the patient's history were reviewed and updated as appropriate: allergies, current medications, past family history, past medical history, past social history, past surgical history and problem list.    Review of Systems   Constitutional: Negative for fatigue and fever.   HENT: Positive for congestion.    Eyes: Negative for visual disturbance.   Respiratory: Negative for shortness of breath.    Cardiovascular: Negative for chest pain.   Gastrointestinal: Negative for constipation and diarrhea.   Genitourinary: Negative for difficulty urinating.   Musculoskeletal: Positive for neck pain.   Neurological: Positive for numbness. Negative for weakness and headaches.   Psychiatric/Behavioral: Negative for sleep disturbance and suicidal ideas. The patient is not nervous/anxious.      I have reviewed and confirmed the accuracy of the ROS as documented by the MA/LPN/RN EMILIE Shrestha    Vitals:    08/26/22 1508   BP: 145/85   Pulse: 74   Resp: 18   Temp: 98 °F (36.7 °C)   SpO2: 96%   Weight: 114 kg (250 lb 6.4 oz)   Height: 172.7 cm (67.99\")   PainSc:   1   PainLoc: Shoulder     Objective   Physical Exam  Vitals and nursing note reviewed.   Constitutional:       General: He is not in acute distress.     Appearance: Normal appearance. He is not ill-appearing.   Pulmonary:      Effort: Pulmonary effort is normal. No respiratory distress.   Musculoskeletal:      Right shoulder: Tenderness present.      Left shoulder: Tenderness present.      Right upper arm: Tenderness present.      Right hand: Tenderness present. Decreased strength.      Cervical back: Tenderness present.   Skin:     General: Skin is warm and dry.   Neurological:      Mental Status: He is alert and oriented to person, place, and " time.      Motor: No weakness.      Gait: Gait normal.   Psychiatric:         Mood and Affect: Mood normal.         Behavior: Behavior normal.       Assessment & Plan   Diagnoses and all orders for this visit:    1. Chronic pain syndrome (Primary)    2. Brachial plexus neuropathy    3. Neurofibromatosis (HCC)    4. Neck pain    5. Acute post-operative pain      --- Continue Hydrocodone. Patient appears stable with current regimen. No adverse effects. Regarding continuation of opioids, there is no evidence of aberrant behavior or any red flags.  The patient continues with appropriate response to opioid therapy. ADL's remain intact by self.   --- The urine drug screen confirmation from 5/17/2022 has been reviewed and the result is appropriate based on patient history and BIRGIT report  --- The patient signed an updated copy of the controlled substance agreement on 5/17/2022  --- Follow-up 3 months or sooner if needed            BIRGIT REPORT  As part of the patient's treatment plan, I am prescribing controlled substances. The patient has been made aware of appropriate use of such medications, including potential risk of somnolence, limited ability to drive and/or work safely, and the potential for dependence or overdose. It has also been made clear that these medications are for use by this patient only, without concomitant use of alcohol or other substances unless prescribed.     Patient has completed prescribing agreement detailing terms of continued prescribing of controlled substances, including monitoring BIRGIT reports, urine drug screening, and pill counts if necessary. The patient is aware that inappropriate use will results in cessation of prescribing such medications.    As the clinician, I personally reviewed the BIRGIT from 8/26/2022 while the patient was in the office today.    History and physical exam exhibit continued safe and appropriate use of controlled substances.     Dictated utilizing Dragon  dictation.     This document is intended for medical expert use only. Reading of this document by patients and/or patient's family without participating medical staff guidance may result in misinterpretation and unintended morbidity.   Any interpretation of such data is the responsibility of the patient and/or family member responsible for the patient in concert with their primary or specialist providers, not to be left for sources of online searches such as [employer], [employer] or similar queries. Relying on these approaches to knowledge may result in misinterpretation, misguided goals of care and even death should patients or family members try recommendations outside of the realm of professional medical care in a supervised way.    Patient remained masked during entire encounter. No cough present. I donned a mask and eye protection throughout entire visit. Prior to donning mask and eye protection, hand hygiene was performed, as well as when it was doffed.  I was closer than 6 feet, but not for an extended period of time. No obvious exposure to any bodily fluids.

## 2022-08-31 RX ORDER — GABAPENTIN ENACARBIL 600 MG/1
1 TABLET, EXTENDED RELEASE ORAL 2 TIMES DAILY
Qty: 60 TABLET | Refills: 5 | Status: SHIPPED | OUTPATIENT
Start: 2022-08-31 | End: 2023-02-11

## 2022-09-12 DIAGNOSIS — I10 ESSENTIAL HYPERTENSION: ICD-10-CM

## 2022-09-12 RX ORDER — AMLODIPINE BESYLATE 10 MG/1
10 TABLET ORAL DAILY
Qty: 90 TABLET | Refills: 0 | Status: SHIPPED | OUTPATIENT
Start: 2022-09-12 | End: 2023-01-11 | Stop reason: SDUPTHER

## 2022-10-07 ENCOUNTER — OFFICE VISIT (OUTPATIENT)
Dept: INTERNAL MEDICINE | Facility: CLINIC | Age: 52
End: 2022-10-07

## 2022-10-07 VITALS
HEIGHT: 68 IN | OXYGEN SATURATION: 96 % | WEIGHT: 246 LBS | SYSTOLIC BLOOD PRESSURE: 154 MMHG | HEART RATE: 89 BPM | BODY MASS INDEX: 37.28 KG/M2 | TEMPERATURE: 98.6 F | DIASTOLIC BLOOD PRESSURE: 100 MMHG

## 2022-10-07 DIAGNOSIS — M79.672 CHRONIC PAIN OF BOTH FEET: Primary | ICD-10-CM

## 2022-10-07 DIAGNOSIS — I10 PRIMARY HYPERTENSION: ICD-10-CM

## 2022-10-07 DIAGNOSIS — G89.29 CHRONIC PAIN OF BOTH FEET: Primary | ICD-10-CM

## 2022-10-07 DIAGNOSIS — M79.671 CHRONIC PAIN OF BOTH FEET: Primary | ICD-10-CM

## 2022-10-07 DIAGNOSIS — Z11.4 SCREENING FOR HIV (HUMAN IMMUNODEFICIENCY VIRUS): ICD-10-CM

## 2022-10-07 DIAGNOSIS — E78.49 OTHER HYPERLIPIDEMIA: ICD-10-CM

## 2022-10-07 PROCEDURE — 99214 OFFICE O/P EST MOD 30 MIN: CPT | Performed by: FAMILY MEDICINE

## 2022-10-07 NOTE — PROGRESS NOTES
"    Chief Complaint  Hyperlipidemia and Hypertension    Subjective    History of Present Illness {CC  Problem List  Visit  Diagnosis   Encounters  Notes  Medications  Labs  Result Review Imaging  Media :23}     Isma De Luna presents to [facility] for   History of Present Illness   51 yo male present for follow up. He state doing well, has had a few trips since last visit. He has been taking medication as prescribed.  During vacation not following diet, eating more sodium.          Social History     Socioeconomic History   • Marital status: Single   Tobacco Use   • Smoking status: Never   • Smokeless tobacco: Never   Substance and Sexual Activity   • Alcohol use: Never   • Drug use: Yes     Types: Hydrocodone, Other     Comment: Prescribed NORCO/Gabapentin   • Sexual activity: Yes     Partners: Female      Objective     Vital Signs:   /100   Pulse 89   Temp 98.6 °F (37 °C)   Ht 172.7 cm (68\")   Wt 112 kg (246 lb)   SpO2 96%   BMI 37.40 kg/m²   Physical Exam  Constitutional:       General: He is not in acute distress.     Appearance: He is not ill-appearing.   HENT:      Head: Normocephalic.   Cardiovascular:      Rate and Rhythm: Regular rhythm.      Heart sounds: Normal heart sounds.   Pulmonary:      Effort: No respiratory distress.      Breath sounds: Normal breath sounds. No wheezing.   Musculoskeletal:      Right lower leg: No edema.      Left lower leg: No edema.   Neurological:      Mental Status: He is alert.        Result Review  Data Reviewed:{ Labs  Result Review  Imaging  Med Tab  Media :23}   The following data was reviewed by: Alia Mancia MD on 10/07/2022  Lab Results - Last 18 Months   Lab Units 04/14/22  0447 03/29/22  1516 03/10/22  0828 11/23/21  1017 06/25/21  1622   BUN mg/dL  --  15  --  17 17   CREATININE mg/dL  --  1.29*  --  1.49* 1.37*   EGFR IF NONAFRICN AM mL/min/1.73  --   --   --  54* 55*   EGFR IF AFRICN AM mL/min/1.73  --   " --   --  62 66   SODIUM mmol/L  --  143  --  142 139   POTASSIUM mmol/L  --  4.0  --  4.9 4.2   CHLORIDE mmol/L  --  103  --  103 102   CALCIUM mg/dL  --  9.7  --  10.0 9.9   ALBUMIN g/dL  --  4.8  --  4.6 4.80   BILIRUBIN mg/dL  --  0.5  --  0.4 0.4   ALK PHOS IU/L  --  76  --  75 80   AST (SGOT) IU/L  --  25  --  32 24   ALT (SGPT) IU/L  --  22  --  36 22   CHOLESTEROL mg/dL  --  206*  --  208* 181   TRIGLYCERIDES mg/dL  --  160*  --  250* 136   HDL CHOL mg/dL  --  43  --  38* 39*   VLDL CHOLESTEROL FRANCIS mg/dL  --  29  --  44* 24   LDL CHOL mg/dL  --  134*  --  126* 118*   WBC x10(9)/L 12.8* 6.9 5.78  --  6.10   RBC x10(12)/L 3.81* 5.09 4.92  --  4.80   HEMATOCRIT % 32.5* 43.2 41.3  --  40.7   MCV fL 85.3 85 83.9  --  84.8   MCH pg  --  28.1 28.0  --  28.3   VIT D 25 HYDROXY ng/mL  --  60.0  --   --   --    URIC ACID mg/dL  --  3.9  --   --   --               Current Outpatient Medications:   •  amLODIPine (NORVASC) 10 MG tablet, TAKE 1 TABLET BY MOUTH DAILY, Disp: 90 tablet, Rfl: 0  •  B Complex-Biotin-FA (HM VITAMIN B100 COMPLEX PO), Take 1 tablet by mouth daily., Disp: , Rfl:   •  cetirizine (zyrTEC) 10 MG tablet, , Disp: , Rfl:   •  Cholecalciferol (VITAMIN D3) 2000 units tablet, Take 1,000 mg by mouth Daily., Disp: , Rfl:   •  DULoxetine (CYMBALTA) 60 MG capsule, TK 1 C PO D, Disp: , Rfl:   •  febuxostat (ULORIC) 40 MG tablet, TAKE 1 TABLET BY MOUTH DAILY INSTEAD OF ALLOPURINOL, Disp: 90 tablet, Rfl: 1  •  ferrous sulfate 325 (65 FE) MG tablet, Take 45 mg by mouth Daily., Disp: , Rfl:   •  fluticasone (VERAMYST) 27.5 MCG/SPRAY nasal spray, 2 sprays into the nostril(s) as directed by provider Daily., Disp: , Rfl:   •  Horizant 600 MG tablet controlled-release, TAKE 600 MG BY MOUTH 2 (TWO) TIMES A DAY., Disp: 60 tablet, Rfl: 5  •  HYDROcodone-acetaminophen (NORCO)  MG per tablet, Take 1 tablet by mouth Every 4 (Four) Hours As Needed for Moderate Pain ., Disp: 120 tablet, Rfl: 0  •  losartan (COZAAR) 100 MG  tablet, TAKE 1 TABLET BY MOUTH DAILY, Disp: 90 tablet, Rfl: 1  •  Multiple Vitamin (MULTI-VITAMIN DAILY PO), Take  by mouth Daily., Disp: , Rfl:   •  NON FORMULARY, Whole Body Herbal Complex, Disp: , Rfl:   •  omeprazole (priLOSEC) 40 MG capsule, TAKE 1 CAPSULE BY MOUTH DAILY, Disp: 90 capsule, Rfl: 1  •  Pyridoxine HCl (Vitamin B6) 100 MG tablet, Take  by mouth., Disp: , Rfl:   •  vitamin B-12 (CYANOCOBALAMIN) 1000 MCG tablet, Take 1,000 mcg by mouth Daily., Disp: , Rfl:       Assessment and Plan {CC Problem List  Visit Diagnosis  ROS  Review (Popup)  Health Maintenance  Quality  BestPractice  Medications  SmartSets  SnapShot Encounters  Media :23}   Problem List Items Addressed This Visit        Cardiac and Vasculature    Hypertension    Relevant Orders    Comprehensive metabolic panel (Completed)    Hyperlipidemia    Relevant Orders    Comprehensive metabolic panel (Completed)    Lipid panel (Completed)       Musculoskeletal and Injuries    Chronic pain of both feet - Primary    Current Assessment & Plan     Due to follow up for new insoles          Relevant Orders    Ambulatory Referral to Podiatry   Other Visit Diagnoses     Screening for HIV (human immunodeficiency virus)        Relevant Orders    HIV-1 / O / 2 Ag / Antibody 4th Generation (Completed)          Follow Up   Return in about 4 months (around 2/7/2023) for Annual physical.  Patient was given instructions and counseling regarding his condition or for health maintenance advice. Please see specific information pulled into the AVS if appropriate.    EpicAct:_CONSTANTIN_AMB_ORDERS,RunParams:STARTUPTYPE=FOLLOW    MR_WS_AMB_DISCHARGE

## 2022-10-08 LAB
ALBUMIN SERPL-MCNC: 4.9 G/DL (ref 3.5–5.2)
ALBUMIN/GLOB SERPL: 2.5 G/DL
ALP SERPL-CCNC: 82 U/L (ref 39–117)
ALT SERPL-CCNC: 18 U/L (ref 1–41)
AST SERPL-CCNC: 25 U/L (ref 1–40)
BILIRUB SERPL-MCNC: 0.4 MG/DL (ref 0–1.2)
BUN SERPL-MCNC: 10 MG/DL (ref 6–20)
BUN/CREAT SERPL: 7.4 (ref 7–25)
CALCIUM SERPL-MCNC: 10.4 MG/DL (ref 8.6–10.5)
CHLORIDE SERPL-SCNC: 104 MMOL/L (ref 98–107)
CHOLEST SERPL-MCNC: 191 MG/DL (ref 0–200)
CO2 SERPL-SCNC: 31 MMOL/L (ref 22–29)
CREAT SERPL-MCNC: 1.36 MG/DL (ref 0.76–1.27)
EGFRCR SERPLBLD CKD-EPI 2021: 62.6 ML/MIN/1.73
GLOBULIN SER CALC-MCNC: 2 GM/DL
GLUCOSE SERPL-MCNC: 105 MG/DL (ref 65–99)
HDLC SERPL-MCNC: 44 MG/DL (ref 40–60)
HIV 1+2 AB+HIV1 P24 AG SERPL QL IA: NON REACTIVE
LDLC SERPL CALC-MCNC: 119 MG/DL (ref 0–100)
POTASSIUM SERPL-SCNC: 3.6 MMOL/L (ref 3.5–5.2)
PROT SERPL-MCNC: 6.9 G/DL (ref 6–8.5)
SODIUM SERPL-SCNC: 146 MMOL/L (ref 136–145)
TRIGL SERPL-MCNC: 155 MG/DL (ref 0–150)
VLDLC SERPL CALC-MCNC: 28 MG/DL (ref 5–40)

## 2022-10-10 NOTE — ASSESSMENT & PLAN NOTE
Hypertension is Chronic, elevated today repeat improved after some rest.   Continue current treatment regimen.  Dietary sodium restriction.  Regular aerobic exercise.  Continue current medications.  Ambulatory blood pressure monitoring.  check BP at home, advise to get back on low sodium diet.   Blood pressure will be reassessed in 4 weeks-8 weeks.

## 2022-10-21 ENCOUNTER — CLINICAL SUPPORT (OUTPATIENT)
Dept: FAMILY MEDICINE CLINIC | Facility: CLINIC | Age: 52
End: 2022-10-21

## 2022-10-21 DIAGNOSIS — Z23 NEED FOR VACCINATION: Primary | ICD-10-CM

## 2022-10-21 PROCEDURE — 90611 SMALLPOX&MONKEYPOX VAC 0.5ML: CPT | Performed by: FAMILY MEDICINE

## 2022-10-21 PROCEDURE — 90471 IMMUNIZATION ADMIN: CPT | Performed by: FAMILY MEDICINE

## 2022-10-24 ENCOUNTER — TELEPHONE (OUTPATIENT)
Dept: INTERNAL MEDICINE | Facility: CLINIC | Age: 52
End: 2022-10-24

## 2022-10-24 NOTE — TELEPHONE ENCOUNTER
Caller: Isma De Luna    Relationship: Self    Best call back number:     What is the medical concern/diagnosis:     What specialty or service is being requested: PODIATRY    What is the provider, practice or medical service name:     What is the office location:     What is the office phone number:     Any additional details: PATIENT SAYS HE WAS REFERRED TO SEE ZAIDA SOOD AS HIS PODIATRIST AND HE IS NO LONGER WITH THE COMPANY HE WANTS TO BE REFERRED TO SEE SOMEONE ELSE.

## 2022-10-31 DIAGNOSIS — I10 HYPERTENSION, BENIGN: ICD-10-CM

## 2022-10-31 DIAGNOSIS — Z87.39 HISTORY OF GOUT: ICD-10-CM

## 2022-10-31 DIAGNOSIS — K21.9 GASTROESOPHAGEAL REFLUX DISEASE WITHOUT ESOPHAGITIS: ICD-10-CM

## 2022-11-01 RX ORDER — FEBUXOSTAT 40 MG/1
40 TABLET, FILM COATED ORAL DAILY
Qty: 90 TABLET | Refills: 0 | Status: SHIPPED | OUTPATIENT
Start: 2022-11-01 | End: 2023-02-13 | Stop reason: SDUPTHER

## 2022-11-01 RX ORDER — LOSARTAN POTASSIUM 100 MG/1
100 TABLET ORAL DAILY
Qty: 90 TABLET | Refills: 0 | Status: SHIPPED | OUTPATIENT
Start: 2022-11-01 | End: 2023-02-13 | Stop reason: SDUPTHER

## 2022-11-01 RX ORDER — OMEPRAZOLE 40 MG/1
40 CAPSULE, DELAYED RELEASE ORAL DAILY
Qty: 90 CAPSULE | Refills: 0 | Status: SHIPPED | OUTPATIENT
Start: 2022-11-01 | End: 2023-02-13 | Stop reason: SDUPTHER

## 2022-11-10 DIAGNOSIS — I10 HYPERTENSION, BENIGN: ICD-10-CM

## 2022-11-11 RX ORDER — LOSARTAN POTASSIUM 100 MG/1
TABLET ORAL
Qty: 90 TABLET | Refills: 0 | OUTPATIENT
Start: 2022-11-11

## 2022-11-18 ENCOUNTER — HOSPITAL ENCOUNTER (OUTPATIENT)
Dept: GENERAL RADIOLOGY | Facility: HOSPITAL | Age: 52
Discharge: HOME | End: 2022-11-18

## 2022-11-18 ENCOUNTER — OFFICE VISIT (OUTPATIENT)
Dept: INTERNAL MEDICINE | Facility: CLINIC | Age: 52
End: 2022-11-18

## 2022-11-18 VITALS
BODY MASS INDEX: 37.16 KG/M2 | HEIGHT: 68 IN | TEMPERATURE: 98 F | DIASTOLIC BLOOD PRESSURE: 91 MMHG | OXYGEN SATURATION: 97 % | WEIGHT: 245.2 LBS | HEART RATE: 84 BPM | SYSTOLIC BLOOD PRESSURE: 137 MMHG

## 2022-11-18 DIAGNOSIS — R07.0 THROAT DISCOMFORT: Primary | ICD-10-CM

## 2022-11-18 PROCEDURE — 70360 X-RAY EXAM OF NECK: CPT

## 2022-11-18 PROCEDURE — 99213 OFFICE O/P EST LOW 20 MIN: CPT

## 2022-11-18 RX ORDER — SILDENAFIL CITRATE 20 MG/1
20 TABLET ORAL AS NEEDED
COMMUNITY

## 2022-11-18 RX ORDER — FEXOFENADINE HCL 180 MG/1
180 TABLET ORAL DAILY
COMMUNITY

## 2022-11-19 LAB
T4 FREE SERPL-MCNC: 1.3 NG/DL (ref 0.93–1.7)
TSH SERPL DL<=0.005 MIU/L-ACNC: 0.74 UIU/ML (ref 0.27–4.2)

## 2022-11-21 ENCOUNTER — CLINICAL SUPPORT (OUTPATIENT)
Dept: FAMILY MEDICINE CLINIC | Facility: CLINIC | Age: 52
End: 2022-11-21

## 2022-11-21 DIAGNOSIS — Z23 NEED FOR VACCINATION: Primary | ICD-10-CM

## 2022-11-21 PROCEDURE — 90471 IMMUNIZATION ADMIN: CPT | Performed by: FAMILY MEDICINE

## 2022-11-21 PROCEDURE — 90611 SMALLPOX&MONKEYPOX VAC 0.5ML: CPT | Performed by: FAMILY MEDICINE

## 2022-12-02 ENCOUNTER — OFFICE VISIT (OUTPATIENT)
Dept: PAIN MEDICINE | Facility: CLINIC | Age: 52
End: 2022-12-02

## 2022-12-02 VITALS
HEART RATE: 81 BPM | DIASTOLIC BLOOD PRESSURE: 96 MMHG | BODY MASS INDEX: 37.59 KG/M2 | TEMPERATURE: 98 F | OXYGEN SATURATION: 99 % | WEIGHT: 248 LBS | SYSTOLIC BLOOD PRESSURE: 153 MMHG | HEIGHT: 68 IN | RESPIRATION RATE: 20 BRPM

## 2022-12-02 DIAGNOSIS — G54.0 BRACHIAL PLEXUS NEUROPATHY: ICD-10-CM

## 2022-12-02 DIAGNOSIS — G89.4 CHRONIC PAIN SYNDROME: Primary | ICD-10-CM

## 2022-12-02 DIAGNOSIS — Q85.00 NEUROFIBROMATOSIS: ICD-10-CM

## 2022-12-02 DIAGNOSIS — M54.2 NECK PAIN: ICD-10-CM

## 2022-12-02 DIAGNOSIS — Z79.899 ENCOUNTER FOR LONG-TERM (CURRENT) USE OF HIGH-RISK MEDICATION: ICD-10-CM

## 2022-12-02 PROCEDURE — 80305 DRUG TEST PRSMV DIR OPT OBS: CPT | Performed by: NURSE PRACTITIONER

## 2022-12-02 PROCEDURE — 99214 OFFICE O/P EST MOD 30 MIN: CPT | Performed by: NURSE PRACTITIONER

## 2022-12-02 RX ORDER — HYDROCODONE BITARTRATE AND ACETAMINOPHEN 10; 325 MG/1; MG/1
1 TABLET ORAL EVERY 6 HOURS PRN
Qty: 60 TABLET | Refills: 0 | Status: SHIPPED | OUTPATIENT
Start: 2022-12-02

## 2022-12-02 NOTE — PROGRESS NOTES
CHIEF COMPLAINT  F/u bilat arm and neck pain. Pt sts pain is the same.     Subjective   Isma De Luna is a 52 y.o. male  who presents for follow-up.  He has a history of arm pain, neck pain, neurofibromatosis.  Today his pain is 2/10VAS in severity. Taking hydrocodone 10/325 which utilizes extremely sparingly (still has medication left from refill 5 months ago). He also continues with Horizant 600 mg BID, Cymbalta 60 mg daily. Reports moderate pain reduction with regimen and denies adverse reactions. He continues working full time.      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 He is a patient at the [facility].  Had surgery on the left side a couple of years ago. He is post-op (4/13/2022) removal of right brachial plexus tumors.  Overall he has improved significantly. still needs another surgery for lesion in the right axilla.  Monitoring with right brachial plexus MRI every 6 months.       Recently completed proton radiation therapy for lesion at T1.      Neck Pain   This is a chronic problem. The current episode started more than 1 month ago. The problem occurs constantly. The problem has been unchanged. The pain is present in the midline and right side. The quality of the pain is described as aching, cramping and shooting. The pain is at a severity of 1/10. The pain is mild. The symptoms are aggravated by twisting. Pertinent negatives include no chest pain, fever, headaches, numbness or weakness. He has tried acetaminophen, bed rest, heat, ice, NSAIDs, oral narcotics, muscle relaxants, neck support and home exercises (hydrocodone) for the symptoms. The treatment provided moderate relief.   Arm Pain   The pain is present in the left hand, right hand, upper left arm and upper right arm. The quality of the pain is described as aching (tingling). The pain radiates to the right arm and right hand. The pain is at a severity of 1/10. The pain is mild. The pain has been fluctuating  "since the incident. Pertinent negatives include no chest pain or numbness.      PEG Assessment   What number best describes your pain on average in the past week?2  What number best describes how, during the past week, pain has interfered with your enjoyment of life?2  What number best describes how, during the past week, pain has interfered with your general activity?  2    The following portions of the patient's history were reviewed and updated as appropriate: allergies, current medications, past family history, past medical history, past social history, past surgical history and problem list.    Review of Systems   Constitutional: Negative for activity change, fatigue and fever.   HENT: Negative for congestion.    Eyes: Negative for visual disturbance.   Respiratory: Negative for cough and shortness of breath.    Cardiovascular: Negative for chest pain.   Gastrointestinal: Negative for constipation and diarrhea.   Genitourinary: Negative for difficulty urinating.   Musculoskeletal: Positive for arthralgias (bilat arm), neck pain and neck stiffness. Negative for back pain.   Neurological: Negative for dizziness, weakness, light-headedness, numbness and headaches.   Psychiatric/Behavioral: Negative for agitation, sleep disturbance and suicidal ideas. The patient is not nervous/anxious.      I have reviewed and confirmed the accuracy of the ROS as documented by the MA/LPN/RN EMILIE Shrestha    Vitals:    12/02/22 1538   BP: 153/96  Comment: pt sts had bp med last night   Pulse: 81   Resp: 20   Temp: 98 °F (36.7 °C)   SpO2: 99%   Weight: 112 kg (248 lb)   Height: 172.7 cm (68\")   PainSc:   2   PainLoc: Neck  Comment: and bilat arm     Objective   Physical Exam  Vitals and nursing note reviewed.   Constitutional:       General: He is not in acute distress.     Appearance: Normal appearance. He is not ill-appearing.   Pulmonary:      Effort: Pulmonary effort is normal. No respiratory distress. "   Musculoskeletal:      Right shoulder: Tenderness present.      Left shoulder: Tenderness present.      Right upper arm: Tenderness present.      Right hand: Tenderness present. Decreased strength.      Cervical back: Tenderness present.   Neurological:      Mental Status: He is alert and oriented to person, place, and time.      Motor: No weakness.      Gait: Gait normal.   Psychiatric:         Mood and Affect: Mood normal.         Behavior: Behavior normal.       Assessment & Plan   Diagnoses and all orders for this visit:    1. Chronic pain syndrome (Primary)  -     HYDROcodone-acetaminophen (NORCO)  MG per tablet; Take 1 tablet by mouth Every 6 (Six) Hours As Needed for Severe Pain.  Dispense: 60 tablet; Refill: 0    2. Brachial plexus neuropathy  -     HYDROcodone-acetaminophen (NORCO)  MG per tablet; Take 1 tablet by mouth Every 6 (Six) Hours As Needed for Severe Pain.  Dispense: 60 tablet; Refill: 0    3. Neurofibromatosis (HCC)    4. Neck pain    5. Encounter for long-term (current) use of high-risk medication      --- Refill Hydrocodone. Patient appears stable with current regimen. No adverse effects. Regarding continuation of opioids, there is no evidence of aberrant behavior or any red flags.  The patient continues with appropriate response to opioid therapy. ADL's remain intact by self.   --- The patient signed an updated copy of the controlled substance agreement on 5/17/2022  --- Routine UDS in office today as part of monitoring requirements for controlled substances.  The specimen was viewed and the immunoassay result reviewed and is NEG.  This specimen will be sent to [facility] laboratory for confirmation.     --- Follow-up 2-3 months        BIRGIT REPORT  As part of the patient's treatment plan, I am prescribing controlled substances. The patient has been made aware of appropriate use of such medications, including potential risk of somnolence, limited ability to drive and/or work  safely, and the potential for dependence or overdose. It has also been made clear that these medications are for use by this patient only, without concomitant use of alcohol or other substances unless prescribed.     Patient has completed prescribing agreement detailing terms of continued prescribing of controlled substances, including monitoring BIRGIT reports, urine drug screening, and pill counts if necessary. The patient is aware that inappropriate use will results in cessation of prescribing such medications.    As the clinician, I personally reviewed the BIRGIT from 12/2/2022 while the patient was in the office today.    History and physical exam exhibit continued safe and appropriate use of controlled substances.     Dictated utilizing Dragon dictation.       Patient remained masked during entire encounter. No cough present. I donned a mask and eye protection throughout entire visit. Prior to donning mask and eye protection, hand hygiene was performed, as well as when it was doffed.  I was closer than 6 feet, but not for an extended period of time. No obvious exposure to any bodily fluids.

## 2022-12-20 NOTE — TELEPHONE ENCOUNTER
Health Maintenance Due   Topic Date Due   • COVID-19 Vaccine (1) Never done   • Cervical Cancer Screen 30-64 -  Never done   • Breast Cancer Screening  Never done   • Colorectal Cancer Screen-  Never done   • Shingles Vaccine (1 of 2) Never done   • Influenza Vaccine (1) 09/01/2022       Patient is due for topics as listed above but is not proceeding with Immunization(s) COVID-19, Influenza and Shingles, Abdominal Aortic Aneurysm (AAA) screening, Colorectal Cancer Screening: Colonoscopy and Mammogram at this time.   Pt is wanting to know his echo results. PT #: [phone]

## 2023-01-04 ENCOUNTER — OFFICE VISIT (OUTPATIENT)
Dept: INTERNAL MEDICINE | Facility: CLINIC | Age: 53
End: 2023-01-04
Payer: COMMERCIAL

## 2023-01-04 VITALS
OXYGEN SATURATION: 96 % | HEIGHT: 68 IN | WEIGHT: 247.8 LBS | BODY MASS INDEX: 37.56 KG/M2 | HEART RATE: 101 BPM | DIASTOLIC BLOOD PRESSURE: 86 MMHG | SYSTOLIC BLOOD PRESSURE: 134 MMHG

## 2023-01-04 DIAGNOSIS — Q85.03 SCHWANNOMATOSIS: ICD-10-CM

## 2023-01-04 DIAGNOSIS — D64.9 NORMOCYTIC ANEMIA: ICD-10-CM

## 2023-01-04 DIAGNOSIS — I10 ESSENTIAL HYPERTENSION: ICD-10-CM

## 2023-01-04 DIAGNOSIS — M1A.9XX0 CHRONIC GOUT WITHOUT TOPHUS, UNSPECIFIED CAUSE, UNSPECIFIED SITE: Primary | ICD-10-CM

## 2023-01-04 DIAGNOSIS — Z13.1 SCREENING FOR DIABETES MELLITUS: ICD-10-CM

## 2023-01-04 PROCEDURE — 99215 OFFICE O/P EST HI 40 MIN: CPT | Performed by: STUDENT IN AN ORGANIZED HEALTH CARE EDUCATION/TRAINING PROGRAM

## 2023-01-04 RX ORDER — PROPRANOLOL HYDROCHLORIDE 80 MG/1
80 CAPSULE, EXTENDED RELEASE ORAL DAILY
Qty: 30 CAPSULE | Refills: 1 | Status: SHIPPED | OUTPATIENT
Start: 2023-01-04 | End: 2023-01-19 | Stop reason: SDUPTHER

## 2023-01-04 NOTE — PROGRESS NOTES
Eloy Bryant D.O.  Internal Medicine  [facility]  [address], [address]  [place] [address]  [phone]      Chief Complaint  Establish Care (Former patient of Dr. Mancia) and Hypertension    SUBJECTIVE    History of Present Illness    Isma De Luna is a 52 y.o. male who presents to the office today as a new patient to establish care.   Patient is transferring care from Dr Mancia.     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 He is a patient at the [facility].  He has undergone multiple surgical procedures related to this condition including larynx nerve sheath tumor removal in 2005, resection of 3 neurofibromas on spinal cord in 2010, removal of stomach schwannoma in 2014, resection of 2 tumors on left brachial plexus in 2018, excision of neurofibromas from right brachial plexus and C8 nerve root in 2022. Follows with [facility], had radiation treatment at [facility] 11/2022.     Chronic pain syndrome, brachial plexus neuropathy: follows with [facility] , prescribed Horizant 600 mg twice daily and Norco  for breakthrough pain (usually a few times per month).     Kidney cancer s/p left nephrectomy with recurrence in the right kidney which was treated with partial nephrectomy: follows with [facility], reports no recurrence since 2014    Gout: no further attacks since starting febuxostat 40 mg daily     GERD: takes omeprazole 40 mg daily    Depression: takes duloxetine 60 mg daily , feels that it is doing well \"much better now\". Has done grief counseling before.     Allergies: allergic to pollen, ragweed, dust; alternates between Zyrtec and allegra and uses flonase     HTN: takes amlodipine 10 mg daily and losartan 100 mg daily; states amlodipine was increased form 5 mg daily to 10 mg daily a few months ago. He frequently gets systolic BP 140s-150s and diastolic in the upper 80s to low 90s.      ED: takes sildenafil , prescribed by [facility]    HLD: not currently on medication     Allergies   Allergen Reactions   • Molds & Smuts Other (See Comments)     Congestion, sneezing   • Nsaids Other (See Comments)     HX: Kidney CA; radical left nephrectomy, partial right.   • Pollen Extract Other (See Comments)     Other reaction(s): Other (See Comments)  Pollen, rag weed   Other reaction(s): Other (See Comments)  Pollen, rag weed         Outpatient Medications Marked as Taking for the 1/4/23 encounter (Office Visit) with Eloy Bryant,    Medication Sig Dispense Refill   • amLODIPine (NORVASC) 10 MG tablet TAKE 1 TABLET BY MOUTH DAILY 90 tablet 0   • B Complex-Biotin-FA (HM VITAMIN B100 COMPLEX PO) Take 1 tablet by mouth daily.     • cetirizine (zyrTEC) 10 MG tablet      • Cholecalciferol (VITAMIN D3) 2000 units tablet Take 1,000 mg by mouth Daily.     • DULoxetine (CYMBALTA) 60 MG capsule TK 1 C PO D     • febuxostat (ULORIC) 40 MG tablet Take 1 tablet by mouth Daily. 90 tablet 0   • ferrous sulfate 325 (65 FE) MG tablet Take 45 mg by mouth Daily.     • fexofenadine (ALLEGRA) 180 MG tablet Take 180 mg by mouth Daily.     • fluticasone (VERAMYST) 27.5 MCG/SPRAY nasal spray 2 sprays into the nostril(s) as directed by provider Daily.     • Horizant 600 MG tablet controlled-release TAKE 600 MG BY MOUTH 2 (TWO) TIMES A DAY. 60 tablet 5   • HYDROcodone-acetaminophen (NORCO)  MG per tablet Take 1 tablet by mouth Every 6 (Six) Hours As Needed for Severe Pain. 60 tablet 0   • losartan (COZAAR) 100 MG tablet Take 1 tablet by mouth Daily. 90 tablet 0   • Multiple Vitamin (MULTI-VITAMIN DAILY PO) Take  by mouth Daily.     • omeprazole (priLOSEC) 40 MG capsule Take 1 capsule by mouth Daily. 90 capsule 0   • Pyridoxine HCl (Vitamin B6) 100 MG tablet Take  by mouth.     • sildenafil (REVATIO) 20 MG tablet Take 20 mg by mouth As Needed.     • vitamin B-12 (CYANOCOBALAMIN) 1000 MCG tablet Take 1,000 mcg by mouth  Daily.          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   • PONV (postoperative nausea and vomiting)    • Renal cancer (HCC)    • Schwannomatosis (HCC)    • Tenosynovitis, de Quervain     Left Wrist   • Vitamin B12 deficiency      Past Surgical History:   Procedure Laterality Date   • CARPAL TUNNEL RELEASE Left 2018   • CHOLECYSTECTOMY     • COLONOSCOPY N/A 2021    Procedure: COLONOSCOPY WITH POLYPECTOMY (HOT SNARE);  Surgeon: Octavio Novak Jr., MD;  Location: [facility] ENDOSCOPY;  Service: General;  Laterality: N/A;  PRE-- SCREENING  POST-- POLYPS   • HYDROCELECTOMY Left 19   • INGUINAL HERNIA REPAIR Right    • LYMPH NODE BIOPSY      Dr George   • NEPHRECTOMY Left     radical   • NEPHRECTOMY PARTIAL Right    • NEUROFIBROMA EXCISION Left 2018    left side of neck   • NEUROFIBROMA EXCISION Right 2022    EXCISION PERIPHERAL NERVE TUMOR, COMPLEX, brachial plexus tumors, 2 vs 3, right.   • SPINE SURGERY      T12-L1 neurofibromas removed     Family History   Problem Relation Age of Onset   • Ovarian cancer Mother          age 62   • Uterine cancer Mother    • Cancer Father          age 43   • Kidney cancer Brother    • Heart disease Paternal Grandmother    • Stroke Paternal Grandmother    • Diabetes Paternal Grandmother    • Cancer Maternal Grandmother     reports that he has never smoked. He has never used smokeless tobacco. He reports that he does not drink alcohol and does not use drugs.    OBJECTIVE    Vital Signs:   /86   Pulse 101   Ht 172.7 cm (68\")   Wt 112 kg (247 lb 12.8 oz)   SpO2 96%   BMI 37.68 kg/m²     Physical Exam  Vitals reviewed.   Constitutional:       General: He is not  in acute distress.     Appearance: He is obese. He is not ill-appearing.   HENT:      Head: Normocephalic and atraumatic.   Eyes:      General: No scleral icterus.  Cardiovascular:      Rate and Rhythm: Normal rate and regular rhythm.      Heart sounds: Normal heart sounds. No murmur heard.  Pulmonary:      Effort: Pulmonary effort is normal. No respiratory distress.      Breath sounds: Normal breath sounds. No wheezing.   Musculoskeletal:      Right lower leg: No edema.      Left lower leg: No edema.   Skin:     Coloration: Skin is not jaundiced.   Neurological:      Mental Status: He is alert.   Psychiatric:         Mood and Affect: Mood normal.         Behavior: Behavior normal.         Thought Content: Thought content normal.                             ASSESSMENT & PLAN     Diagnoses and all orders for this visit:    1. Chronic gout without tophus, unspecified cause, unspecified site (Primary)  Lab Results   Component Value Date    URICACID 3.9 03/29/2022     -good control on febuxostat 40 mg daily , continue    2. Essential hypertension   -takes amlodipine 10 mg daily and losartan 100 mg daily; states amlodipine was increased form 5 mg daily to 10 mg daily a few months ago. He frequently gets systolic BP 140s-150s and diastolic in the upper 80s to low 90s.   -BP uncontrolled 134/86 in office today  -he is interested in beta blocker therapy as one of his neurology specialists indicated it could help with some of his tremor that resulted after brachial plexus surgery  -will begin propranolol LA 80 mg daily in an attempt to better control BP as well as tremor  -continue BP checks at home and bring log to next visit   -     propranolol LA (INDERAL LA) 80 MG 24 hr capsule; Take 1 capsule by mouth Daily.  Dispense: 30 capsule; Refill: 1    3. Normocytic anemia  -Last CBC 4/2022 demonstrated normocytic anemia with hemoglobin 10.8, RBC 3.1, platelet count normal 221, white blood cell count slightly elevated 12.8.   Patient reports this was perioperative.  I will repeat CBC today to ensure resolution.  He does take over-the-counter iron supplement so I will obtain iron profile today.  -     Ferritin  -     Iron Profile  -     CBC w AUTO Differential    4. Screening for diabetes mellitus  -     Hemoglobin A1c    5. Schwannomatosis (HCC)  -He is a patient at the [facility].  He has undergone multiple surgical procedures related to this condition including larynx nerve sheath tumor removal in 2005, resection of 3 neurofibromas on spinal cord in 2010, removal of stomach schwannoma in 2014, resection of 2 tumors on left brachial plexus in 2018, excision of neurofibromas from right brachial plexus and C8 nerve root in 2022. Follows with [facility], had radiation treatment at [facility] 11/2022.         I spent 52 minutes caring for Isma on this date of service. This time includes time spent by me in the following activities:preparing for the visit, reviewing tests, performing a medically appropriate examination and/or evaluation , counseling and educating the patient/family/caregiver, ordering medications, tests, or procedures and documenting information in the medical record    The following social determinates of health impact the patient's medical decision making: No social determinates of health were factored in to today's visit.     Follow Up  No follow-ups on file.    Patient/family had no further questions at this time and verbalized understanding of the plan discussed today.

## 2023-01-05 LAB
BASOPHILS # BLD AUTO: 0.06 10*3/MM3 (ref 0–0.2)
BASOPHILS NFR BLD AUTO: 0.8 % (ref 0–1.5)
EOSINOPHIL # BLD AUTO: 0.12 10*3/MM3 (ref 0–0.4)
EOSINOPHIL NFR BLD AUTO: 1.7 % (ref 0.3–6.2)
ERYTHROCYTE [DISTWIDTH] IN BLOOD BY AUTOMATED COUNT: 13.4 % (ref 12.3–15.4)
FERRITIN SERPL-MCNC: 56.3 NG/ML (ref 30–400)
HBA1C MFR BLD: 5.7 % (ref 4.8–5.6)
HCT VFR BLD AUTO: 42 % (ref 37.5–51)
HGB BLD-MCNC: 14.3 G/DL (ref 13–17.7)
IMM GRANULOCYTES # BLD AUTO: 0.07 10*3/MM3 (ref 0–0.05)
IMM GRANULOCYTES NFR BLD AUTO: 1 % (ref 0–0.5)
IRON SATN MFR SERPL: 17 % (ref 20–50)
IRON SERPL-MCNC: 77 MCG/DL (ref 59–158)
LYMPHOCYTES # BLD AUTO: 1.76 10*3/MM3 (ref 0.7–3.1)
LYMPHOCYTES NFR BLD AUTO: 24.2 % (ref 19.6–45.3)
MCH RBC QN AUTO: 28.1 PG (ref 26.6–33)
MCHC RBC AUTO-ENTMCNC: 34 G/DL (ref 31.5–35.7)
MCV RBC AUTO: 82.5 FL (ref 79–97)
MONOCYTES # BLD AUTO: 0.6 10*3/MM3 (ref 0.1–0.9)
MONOCYTES NFR BLD AUTO: 8.3 % (ref 5–12)
NEUTROPHILS # BLD AUTO: 4.66 10*3/MM3 (ref 1.7–7)
NEUTROPHILS NFR BLD AUTO: 64 % (ref 42.7–76)
NRBC BLD AUTO-RTO: 0 /100 WBC (ref 0–0.2)
PLATELET # BLD AUTO: 244 10*3/MM3 (ref 140–450)
RBC # BLD AUTO: 5.09 10*6/MM3 (ref 4.14–5.8)
TIBC SERPL-MCNC: 450 MCG/DL
UIBC SERPL-MCNC: 373 MCG/DL (ref 112–346)
WBC # BLD AUTO: 7.27 10*3/MM3 (ref 3.4–10.8)

## 2023-01-05 RX ORDER — PROPRANOLOL HYDROCHLORIDE 80 MG/1
80 CAPSULE, EXTENDED RELEASE ORAL DAILY
Qty: 90 CAPSULE | OUTPATIENT
Start: 2023-01-05

## 2023-01-10 ENCOUNTER — PATIENT MESSAGE (OUTPATIENT)
Dept: INTERNAL MEDICINE | Facility: CLINIC | Age: 53
End: 2023-01-10
Payer: COMMERCIAL

## 2023-01-10 DIAGNOSIS — I10 ESSENTIAL HYPERTENSION: ICD-10-CM

## 2023-01-11 RX ORDER — AMLODIPINE BESYLATE 10 MG/1
10 TABLET ORAL DAILY
Qty: 90 TABLET | Refills: 1 | Status: SHIPPED | OUTPATIENT
Start: 2023-01-11

## 2023-01-11 NOTE — TELEPHONE ENCOUNTER
From: Isma De Luna  To: Eloy Bryant  Sent: 1/10/2023 8:10 PM EST  Subject: Request for refill of Amlodipine 10 mg    Dary Bryant,    I am writing to request a refill of one of my medications for blood pressure, amlodipine. I currently take a 10 mg tablet once a day. My current script has no refills remaining from my previous physician, Dr. Alia Mancia.    Thank you.

## 2023-01-19 ENCOUNTER — OFFICE VISIT (OUTPATIENT)
Dept: INTERNAL MEDICINE | Facility: CLINIC | Age: 53
End: 2023-01-19
Payer: COMMERCIAL

## 2023-01-19 VITALS
DIASTOLIC BLOOD PRESSURE: 82 MMHG | HEART RATE: 75 BPM | SYSTOLIC BLOOD PRESSURE: 124 MMHG | BODY MASS INDEX: 37.44 KG/M2 | OXYGEN SATURATION: 97 % | HEIGHT: 68 IN | WEIGHT: 247 LBS

## 2023-01-19 DIAGNOSIS — I10 ESSENTIAL HYPERTENSION: ICD-10-CM

## 2023-01-19 DIAGNOSIS — Q85.03 SCHWANNOMATOSIS: Primary | ICD-10-CM

## 2023-01-19 DIAGNOSIS — R07.0 THROAT DISCOMFORT: ICD-10-CM

## 2023-01-19 PROCEDURE — 99214 OFFICE O/P EST MOD 30 MIN: CPT | Performed by: STUDENT IN AN ORGANIZED HEALTH CARE EDUCATION/TRAINING PROGRAM

## 2023-01-19 RX ORDER — PROPRANOLOL HYDROCHLORIDE 80 MG/1
80 CAPSULE, EXTENDED RELEASE ORAL DAILY
Qty: 90 CAPSULE | Refills: 1 | Status: SHIPPED | OUTPATIENT
Start: 2023-01-19 | End: 2023-03-30 | Stop reason: SDUPTHER

## 2023-01-19 NOTE — PROGRESS NOTES
"  Eloy Bryant D.O.  Internal Medicine  [facility]  [address], [address]  [place] [address]  [phone]      Chief Complaint  Other (Sensation in back of throat)    SUBJECTIVE    History of Present Illness    Isma De Luna is a 52 y.o. male who presents to the office today as an established patient that last saw me on 1/4/2023.     States for several months he has had a sensation in the far back of his throat or a little further down his throat that \"something is there\". Doesn't feel that it is shutting off his air. States in 2005 something similar happened and it turned out to be a schwannoma. States this sensation never completely goes away but has become more evident. Sometimes it does induce anxiety. No difficulty swallowing. States there was one questionable episode recently that food may have been stuck but that may have just been related to increased mucous.     Allergies   Allergen Reactions   • Molds & Smuts Other (See Comments)     Congestion, sneezing   • Nsaids Other (See Comments)     HX: Kidney CA; radical left nephrectomy, partial right.   • Pollen Extract Other (See Comments)     Other reaction(s): Other (See Comments)  Pollen, rag weed   Other reaction(s): Other (See Comments)  Pollen, rag weed         Outpatient Medications Marked as Taking for the 1/19/23 encounter (Office Visit) with Eloy Bryant, DO   Medication Sig Dispense Refill   • amLODIPine (NORVASC) 10 MG tablet Take 1 tablet by mouth Daily. 90 tablet 1   • B Complex-Biotin-FA (HM VITAMIN B100 COMPLEX PO) Take 1 tablet by mouth daily.     • cetirizine (zyrTEC) 10 MG tablet      • Cholecalciferol (VITAMIN D3) 2000 units tablet Take 1,000 mg by mouth Daily.     • DULoxetine (CYMBALTA) 60 MG capsule TK 1 C PO D     • febuxostat (ULORIC) 40 MG tablet Take 1 tablet by mouth Daily. 90 tablet 0   • ferrous sulfate 325 (65 FE) MG tablet Take 45 mg by mouth Daily.     • fexofenadine (ALLEGRA) 180 MG tablet Take 180 " "mg by mouth Daily.     • fluticasone (VERAMYST) 27.5 MCG/SPRAY nasal spray 2 sprays into the nostril(s) as directed by provider Daily.     • Horizant 600 MG tablet controlled-release TAKE 600 MG BY MOUTH 2 (TWO) TIMES A DAY. 60 tablet 5   • HYDROcodone-acetaminophen (NORCO)  MG per tablet Take 1 tablet by mouth Every 6 (Six) Hours As Needed for Severe Pain. 60 tablet 0   • losartan (COZAAR) 100 MG tablet Take 1 tablet by mouth Daily. 90 tablet 0   • Multiple Vitamin (MULTI-VITAMIN DAILY PO) Take  by mouth Daily.     • NON FORMULARY Whole Body Herbal Complex     • omeprazole (priLOSEC) 40 MG capsule Take 1 capsule by mouth Daily. 90 capsule 0   • propranolol LA (INDERAL LA) 80 MG 24 hr capsule Take 1 capsule by mouth Daily. 90 capsule 1   • Pyridoxine HCl (Vitamin B6) 100 MG tablet Take  by mouth.     • sildenafil (REVATIO) 20 MG tablet Take 20 mg by mouth As Needed.     • vitamin B-12 (CYANOCOBALAMIN) 1000 MCG tablet Take 1,000 mcg by mouth Daily.     • [DISCONTINUED] propranolol LA (INDERAL LA) 80 MG 24 hr capsule Take 1 capsule by mouth Daily. 30 capsule 1        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   • PONV (postoperative nausea and vomiting)    • Prediabetes    • Renal cancer (HCC)    • Schwannomatosis (HCC)    • Tenosynovitis, de Quervain     Left Wrist   • Vitamin B12 deficiency        OBJECTIVE    Vital Signs:   /82   Pulse 75   Ht 172.7 cm (68\")   Wt 112 kg (247 lb)   SpO2 97%   BMI 37.56 kg/m²     Physical Exam  Vitals reviewed.   Constitutional:       General: He is not in acute distress.     Appearance: Normal appearance. He is obese. He is not ill-appearing.   HENT:      Head: Atraumatic.      Mouth/Throat:      " "Mouth: Mucous membranes are moist.      Pharynx: Oropharynx is clear. No oropharyngeal exudate or posterior oropharyngeal erythema.      Comments: Mallampati class III   Eyes:      General: No scleral icterus.  Pulmonary:      Effort: Pulmonary effort is normal. No respiratory distress.   Skin:     Coloration: Skin is not jaundiced.   Neurological:      Mental Status: He is alert.   Psychiatric:         Mood and Affect: Mood normal.         Behavior: Behavior normal.         Thought Content: Thought content normal.                             ASSESSMENT & PLAN     Diagnoses and all orders for this visit:    1. Schwannomatosis (HCC) (Primary)  2. Throat discomfort  -He is a patient at the [facility].  He has undergone multiple surgical procedures related to this condition including larynx nerve sheath tumor removal in 2005, resection of 3 neurofibromas on spinal cord in 2010, removal of stomach schwannoma in 2014, resection of 2 tumors on left brachial plexus in 2018, excision of neurofibromas from right brachial plexus and C8 nerve root in 2022. Follows with [facility], had radiation treatment at [facility] 11/2022.   -States for several months he has had a sensation in the far back of his throat or a little further down his throat that \"something is there\". Doesn't feel that it is shutting off his air. States in 2005 something similar happened and it turned out to be a schwannoma. States this sensation never completely goes away but has become more evident. Sometimes it does induce anxiety. No difficulty swallowing. States there was one questionable episode recently that food may have been stuck but that may have just been related to increased mucous.   -no oropharyngeal abnormality on exam today  -will refer him back to his previous ENT for eval, Dr Sj Riggs. If there is no abnormality in the throat that can be visualized, consider swallow eval and potential GI workup.    3. Essential " hypertension  - takes amlodipine 10 mg daily and losartan 100 mg daily, at last visit propanolol LA 80 mg daily was added  -BP much improved in office today at 124/82 and at home as well  -continue current regimen   -     propranolol LA (INDERAL LA) 80 MG 24 hr capsule; Take 1 capsule by mouth Daily.  Dispense: 90 capsule; Refill: 1            The following social determinates of health impact the patient's medical decision making: No social determinates of health were factored in to today's visit.     Follow Up  No follow-ups on file.    Patient/family had no further questions at this time and verbalized understanding of the plan discussed today.

## 2023-02-11 RX ORDER — GABAPENTIN ENACARBIL 600 MG/1
TABLET, EXTENDED RELEASE ORAL
Qty: 60 TABLET | Refills: 5 | Status: SHIPPED | OUTPATIENT
Start: 2023-02-11

## 2023-02-13 DIAGNOSIS — Z87.39 HISTORY OF GOUT: ICD-10-CM

## 2023-02-13 DIAGNOSIS — I10 HYPERTENSION, BENIGN: ICD-10-CM

## 2023-02-13 DIAGNOSIS — K21.9 GASTROESOPHAGEAL REFLUX DISEASE WITHOUT ESOPHAGITIS: ICD-10-CM

## 2023-02-13 RX ORDER — LOSARTAN POTASSIUM 100 MG/1
100 TABLET ORAL DAILY
Qty: 90 TABLET | Refills: 1 | Status: SHIPPED | OUTPATIENT
Start: 2023-02-13

## 2023-02-13 RX ORDER — OMEPRAZOLE 40 MG/1
40 CAPSULE, DELAYED RELEASE ORAL DAILY
Qty: 90 CAPSULE | Refills: 1 | Status: SHIPPED | OUTPATIENT
Start: 2023-02-13

## 2023-02-13 RX ORDER — FEBUXOSTAT 40 MG/1
40 TABLET, FILM COATED ORAL DAILY
Qty: 90 TABLET | Refills: 1 | Status: SHIPPED | OUTPATIENT
Start: 2023-02-13

## 2023-03-30 ENCOUNTER — OFFICE VISIT (OUTPATIENT)
Dept: INTERNAL MEDICINE | Facility: CLINIC | Age: 53
End: 2023-03-30
Payer: COMMERCIAL

## 2023-03-30 VITALS
BODY MASS INDEX: 37.89 KG/M2 | OXYGEN SATURATION: 94 % | DIASTOLIC BLOOD PRESSURE: 74 MMHG | TEMPERATURE: 97.8 F | WEIGHT: 250 LBS | SYSTOLIC BLOOD PRESSURE: 116 MMHG | HEIGHT: 68 IN | HEART RATE: 84 BPM

## 2023-03-30 DIAGNOSIS — Z12.5 PROSTATE CANCER SCREENING: ICD-10-CM

## 2023-03-30 DIAGNOSIS — Z11.4 ENCOUNTER FOR SCREENING FOR HIV: ICD-10-CM

## 2023-03-30 DIAGNOSIS — Z11.3 SCREENING FOR STD (SEXUALLY TRANSMITTED DISEASE): ICD-10-CM

## 2023-03-30 DIAGNOSIS — Z00.00 ANNUAL PHYSICAL EXAM: Primary | ICD-10-CM

## 2023-03-30 NOTE — PROGRESS NOTES
Eloy Bryant D.O.  Internal Medicine  [facility]  [address], [address]  [place] [address]  [phone]    Chief Complaint  Annual checkup    HISTORY    Isma De Luna is a 53 y.o. male who presents to the office today as a  an established patient for their annual preventative exam.      No hospitalization(s) within the last year.     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 He is a patient at the [facility].  He has undergone multiple surgical procedures related to this condition including larynx nerve sheath tumor removal in 2005, resection of 3 neurofibromas on spinal cord in 2010, removal of stomach schwannoma in 2014, resection of 2 tumors on left brachial plexus in 2018, excision of neurofibromas from right brachial plexus and C8 nerve root in 2022. Follows with [facility], had radiation treatment at [facility] 11/2022.      Chronic pain syndrome, brachial plexus neuropathy: follows with [facility] , prescribed Horizant 600 mg twice daily and Norco  for breakthrough pain (usually a few times per month or less).      Kidney cancer s/p left nephrectomy with recurrence in the right kidney which was treated with partial nephrectomy: previously followed with [facility] but pt states he has transferred care to [facility] for this concern. reports no recurrence since 2014     Gout: no further attacks since starting febuxostat 40 mg daily     GERD: takes omeprazole 40 mg daily    Prediabetes: States he is trying to reduce sugar  Lab Results   Component Value Date    HGBA1C 5.70 (H) 01/04/2023     Depression: takes duloxetine 60 mg daily. Previously on Wellbutrin.  Has done grief counseling before. States this has been slightly worsening over time as he gets close to the anniversary of his brother's death     Allergies:  allergic to pollen, ragweed, dust; alternates between Zyrtec and allegra and uses flonase      HTN: takes amlodipine 10 mg daily, propranolol LA 80 mg daily, losartan 100 mg daily     ED: takes sildenafil  mg as needed; states he uses this once or twice per week     HLD: not currently on medication     Any other concerns regarding their health today: none    Health Maintenance Summary          Overdue - ANNUAL PHYSICAL (Yearly) Overdue since 10/9/2021    10/09/2020  Done          LIPID PANEL (Yearly) Next due on 10/7/2023    10/07/2022  Lipid panel    03/29/2022  Lipid Panel    11/23/2021  Lipid panel    06/25/2021  Lipid Panel    10/09/2020  Lipid Panel    Only the first 5 history entries have been loaded, but more history exists.          COLORECTAL CANCER SCREENING (COLONOSCOPY - Every 5 Years) Next due on 6/18/2026 06/18/2021  Surgical Procedure: COLONOSCOPY    06/18/2021  SCANNED - COLONOSCOPY          TDAP/TD VACCINES (4 - Td or Tdap) Next due on 10/4/2026    10/04/2016  Imm Admin: Tdap    06/03/2008  Imm Admin: Td    01/01/1998  Imm Admin: Td          Pneumococcal Vaccine 0-64 (Series Information) Aged Out    11/21/2017  Imm Admin: Pneumococcal Conjugate 13-Valent (PCV13)    09/01/2012  Imm Admin: PEDS-Pneumococcal Conjugate (PCV7)    09/01/2012  Imm Admin: Pneumococcal, Unspecified    04/02/2012  Imm Admin: Pneumococcal Polysaccharide (PPSV23)          HEPATITIS C SCREENING  Completed    06/19/2020  Hep C Virus Ab component of Hepatitis C antibody          ZOSTER VACCINE (Series Information) Completed    02/11/2022  Imm Admin: Shingrix    11/12/2021  Imm Admin: Shingrix          COVID-19 Vaccine (Series Information) Completed    10/14/2022  Imm Admin: COVID-19 (MODERNA) BIVALENT BOOSTER 12+YRS    07/15/2022  Imm Admin: COVID-19 (MODERNA) BOOSTER    10/26/2021  Imm Admin: COVID-19 (MODERNA) 1st, 2nd, 3rd Dose Only    04/07/2021  Imm Admin: COVID-19 (MODERNA) 1st, 2nd, 3rd Dose Only    04/07/2021   Imm Admin: COVID-19 (MODERNA) 1st, 2nd, 3rd Dose Only    Only the first 5 history entries have been loaded, but more history exists.          INFLUENZA VACCINE  Completed    10/14/2022  Imm Admin: FluLaval/Fluzone >6mos    10/01/2021  Imm Admin: FluLaval/Fluzone >6mos    09/03/2020  Done - [facility]    08/21/2020  Imm Admin: FluLaval/Fluzone >6mos    10/11/2019  Done    Only the first 5 history entries have been loaded, but more history exists.          Orthopox/Monkeypox (Series Information) Completed    11/21/2022  Imm Admin: Monkeypox/Smallpox ID (JYNNEOS)    10/21/2022  Imm Admin: Monkeypox/Smallpox ID (JYNNEOS)                 Allergies   Allergen Reactions   • Molds & Smuts Other (See Comments)     Congestion, sneezing   • Nsaids Other (See Comments)     HX: Kidney CA; radical left nephrectomy, partial right.   • Pollen Extract Other (See Comments)     Other reaction(s): Other (See Comments)  Pollen, rag weed   Other reaction(s): Other (See Comments)  Pollen, rag weed         Outpatient Medications Marked as Taking for the 3/30/23 encounter (Office Visit) with Eloy Bryant, DO   Medication Sig Dispense Refill   • amLODIPine (NORVASC) 10 MG tablet Take 1 tablet by mouth Daily. 90 tablet 1   • B Complex-Biotin-FA (HM VITAMIN B100 COMPLEX PO) Take 1 tablet by mouth daily.     • cetirizine (zyrTEC) 10 MG tablet      • Cholecalciferol (VITAMIN D3) 2000 units tablet Take 20,000 tablets by mouth Daily.     • DULoxetine (CYMBALTA) 60 MG capsule TK 1 C PO D     • febuxostat (ULORIC) 40 MG tablet Take 1 tablet by mouth Daily. 90 tablet 1   • ferrous sulfate 325 (65 FE) MG tablet Take 45 mg by mouth Daily.     • fexofenadine (ALLEGRA) 180 MG tablet Take 1 tablet by mouth Daily.     • fluticasone (VERAMYST) 27.5 MCG/SPRAY nasal spray 2 sprays into the nostril(s) as directed by provider Daily.     • Horizant 600 MG tablet controlled-release TAKE 1 TABLET BY MOUTH 2 (TWO) TIMES A DAY. 60 tablet 5   •  HYDROcodone-acetaminophen (NORCO)  MG per tablet Take 1 tablet by mouth Every 6 (Six) Hours As Needed for Severe Pain. 60 tablet 0   • losartan (COZAAR) 100 MG tablet Take 1 tablet by mouth Daily. 90 tablet 1   • Multiple Vitamin (MULTI-VITAMIN DAILY PO) Take  by mouth Daily.     • NON FORMULARY Whole Body Herbal Complex     • omeprazole (priLOSEC) 40 MG capsule Take 1 capsule by mouth Daily. 90 capsule 1   • propranolol XL (INNOPRAN XL) 80 MG 24 hr capsule Take 1 capsule by mouth Daily.     • Pyridoxine HCl (Vitamin B6) 100 MG tablet Take  by mouth.     • sildenafil (REVATIO) 20 MG tablet Take 1 tablet by mouth As Needed.     • vitamin B-12 (CYANOCOBALAMIN) 1000 MCG tablet Take 1 tablet by mouth Daily.         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   • PONV (postoperative nausea and vomiting)    • Prediabetes    • Renal cancer (HCC)    • Schwannomatosis (HCC)    • Tenosynovitis, de Quervain     Left Wrist   • Vitamin B12 deficiency      Past Surgical History:   Procedure Laterality Date   • CARPAL TUNNEL RELEASE Left 03/26/2018   • CHOLECYSTECTOMY  2007   • COLONOSCOPY N/A 06/18/2021    Procedure: COLONOSCOPY WITH POLYPECTOMY (HOT SNARE);  Surgeon: Octavio Novak Jr., MD;  Location[facility] ENDOSCOPY;  Service: General;  Laterality: N/A;  PRE-- SCREENING  POST-- POLYPS   • HYDROCELECTOMY Left 12-18-19   • INGUINAL HERNIA REPAIR Right 2001   • LYMPH NODE BIOPSY  2014    Dr George   • NEPHRECTOMY Left     radical   • NEPHRECTOMY PARTIAL Right    • NEUROFIBROMA EXCISION Left 04/13/2018    left side of neck   • NEUROFIBROMA EXCISION Right 04/13/2022    EXCISION PERIPHERAL NERVE TUMOR, COMPLEX, brachial plexus tumors, 2 vs 3, right.   • SPINE  "SURGERY  2010    T12-L1 neurofibromas removed     Family History   Problem Relation Age of Onset   • Ovarian cancer Mother          age 62   • Uterine cancer Mother    • Cancer Father          age 43   • Kidney cancer Brother    • Heart disease Paternal Grandmother    • Stroke Paternal Grandmother    • Diabetes Paternal Grandmother    • Cancer Maternal Grandmother     reports that he has never smoked. He has never used smokeless tobacco. He reports that he does not drink alcohol and does not use drugs.    Immunization History   Administered Date(s) Administered   • COVID-19 (MODERNA) 1st, 2nd, 3rd Dose Only 2021, 2021, 2021, 2021, 10/26/2021   • COVID-19 (MODERNA) BIVALENT BOOSTER 12+YRS 10/14/2022   • COVID-19 (MODERNA) BOOSTER 07/15/2022   • FluLaval/Fluzone >6mos 2020, 10/01/2021, 10/14/2022   • FluMist 2-49yrs 2017   • Hepatitis A 2018, 2019, 2019   • Influenza TIV (IM) 2013, 10/21/2014, 10/04/2016   • Influenza, Unspecified 2018   • Monkeypox/Smallpox ID (JYNNEOS) 10/21/2022, 2022   • PEDS-Pneumococcal Conjugate (PCV7) 2012   • Pneumococcal Conjugate 13-Valent (PCV13) 2017   • Pneumococcal Polysaccharide (PPSV23) 2012   • Pneumococcal, Unspecified 2012   • Shingrix 2021, 2022   • Td 1998, 2008   • Tdap 10/04/2016        OBJECTIVE    Vital Signs:   /74   Pulse 84   Temp 97.8 °F (36.6 °C) (Infrared)   Ht 172.7 cm (68\")   Wt 113 kg (250 lb)   SpO2 94%   BMI 38.01 kg/m²     Physical Exam  Vitals reviewed.   Constitutional:       General: He is not in acute distress.     Appearance: Normal appearance. He is obese. He is not ill-appearing.   HENT:      Head: Normocephalic and atraumatic.      Right Ear: Tympanic membrane, ear canal and external ear normal. There is no impacted cerumen.      Left Ear: Tympanic membrane, ear canal and external ear normal. There is no impacted " cerumen.      Mouth/Throat:      Mouth: Mucous membranes are moist.      Pharynx: No oropharyngeal exudate or posterior oropharyngeal erythema.   Eyes:      General: No scleral icterus.     Extraocular Movements: Extraocular movements intact.      Conjunctiva/sclera: Conjunctivae normal.      Pupils: Pupils are equal, round, and reactive to light.   Cardiovascular:      Rate and Rhythm: Normal rate and regular rhythm.      Heart sounds: Normal heart sounds. No murmur heard.  Pulmonary:      Effort: Pulmonary effort is normal. No respiratory distress.      Breath sounds: Normal breath sounds. No wheezing.   Abdominal:      General: Bowel sounds are normal. There is no distension.      Palpations: Abdomen is soft.      Tenderness: There is no abdominal tenderness. There is no guarding.   Musculoskeletal:      Cervical back: Neck supple. No tenderness.      Right lower leg: No edema.      Left lower leg: No edema.   Lymphadenopathy:      Cervical: No cervical adenopathy.   Skin:     General: Skin is warm and dry.      Coloration: Skin is not jaundiced.   Neurological:      General: No focal deficit present.      Mental Status: He is alert and oriented to person, place, and time.      Cranial Nerves: No cranial nerve deficit.      Motor: No weakness.   Psychiatric:         Mood and Affect: Mood normal.         Behavior: Behavior normal.         Thought Content: Thought content normal.                   The 10-year ASCVD risk score (Montrell DK, et al., 2019) is: 4.9%    Values used to calculate the score:      Age: 53 years      Sex: Male      Is Non- : No      Diabetic: No      Tobacco smoker: No      Systolic Blood Pressure: 116 mmHg      Is BP treated: Yes      HDL Cholesterol: 44 mg/dL      Total Cholesterol: 191 mg/dL           ASSESSMENT & PLAN     1. Annual Preventative Health Examination  -Age and sex appropriate physical exam performed and documented. Updated past medical, family, social  and surgical histories as well as allergies and care team list. Addressed care gaps listed in the medical record.  -Encouraged annual dental and vision exams as part of their overall health.  -Encouraged minimum of 30 minutes or more of exercise at a brisk walk or higher 5 days per week combined with a well-balanced diet.   -Immunizations reviewed and updated in EMR.   -Lipid screening:   Lipid Panel    Lipid Panel 10/7/22   Total Cholesterol 191   Triglycerides 155 (A)   HDL Cholesterol 44   VLDL Cholesterol 28   LDL Cholesterol  119 (A)   (A) Abnormal value       Comments are available for some flowsheets but are not being displayed.          Patient is over age 40 and a 10-year ASCVD risk was calculated which does not indicate a need for statin therapy.   -Aspirin for primary or secondary prevention: ASCVD risk calculate and aspirin for primary prevention is not indicated.  -Depression and Anxiety screening: Patient has known diagnosis of depression.  -Diabetes screening: Patient has a diagnosis of pre-diabetes and is being monitored with yearly hemoglobin A1c.   -Tobacco use screening: Patient denies cigarette use. Tobacco counseling was was not indicated.  -Alcohol use screening: Patient denies alcohol consumption.. Alcohol abuse counseling was was not indicated.  -Illicit drug screening: Patient does not use illicit drugs.   -Abdominal aortic aneurysm screening: AAA screening is not indicated as patient is less than 65 years of age.  -Hypertension screening: Patient with known diagnosis of hypertension and is receiving treatment.  -HIV screening: neg screening 10/2022, requests screening today  -Hepatitis C virus screening:  Patient has already completed Hepatitis C screening. Negative screening on file.   -Syphilis screening: will screen today  -Hepatitis B virus screening: will screen today  -Colon cancer screening: Patient is already up to date on their colon cancer screening with colonoscopy is indicated  again in 2026  -Lung cancer screening: Patient has never smoked.  -Prostate cancer screening: will repeat today  Lab Results   Component Value Date    PSA 1.640 06/19/2020     Recommend he come to me for HIV PrEP initiation should he plan to become sexually active with multiple or high risk partners.     Follow up in 1 year for annual physical exam.    Patient/family had no further questions at this time and verbalized understanding of the plan discussed today.

## 2023-04-01 LAB
C TRACH DNA ANORECTL QL NAA+PROBE: NEGATIVE
N GONORRHOEA RRNA ANORECTL QL NAA+PROBE: NEGATIVE
N GONORRHOEA RRNA NPH QL NAA+PROBE: NEGATIVE

## 2023-04-02 LAB
BUN SERPL-MCNC: 14 MG/DL (ref 6–20)
BUN/CREAT SERPL: 9.8 (ref 7–25)
C TRACH RRNA SPEC QL NAA+PROBE: NEGATIVE
CALCIUM SERPL-MCNC: 10.3 MG/DL (ref 8.6–10.5)
CHLORIDE SERPL-SCNC: 101 MMOL/L (ref 98–107)
CO2 SERPL-SCNC: 30.5 MMOL/L (ref 22–29)
CREAT SERPL-MCNC: 1.43 MG/DL (ref 0.76–1.27)
EGFRCR SERPLBLD CKD-EPI 2021: 58.6 ML/MIN/1.73
GLUCOSE SERPL-MCNC: 108 MG/DL (ref 65–99)
HBV CORE AB SERPL QL IA: NEGATIVE
HBV SURFACE AB SER QL: NON REACTIVE
HBV SURFACE AG SERPL QL IA: NEGATIVE
HIV 1+2 AB+HIV1 P24 AG SERPL QL IA: NON REACTIVE
IMP & REVIEW OF LAB RESULTS: NORMAL
LABORATORY COMMENT REPORT: NORMAL
N GONORRHOEA RRNA SPEC QL NAA+PROBE: NEGATIVE
POTASSIUM SERPL-SCNC: 4.5 MMOL/L (ref 3.5–5.2)
PSA SERPL-MCNC: 0.95 NG/ML (ref 0–4)
RPR SER QL: NORMAL
SODIUM SERPL-SCNC: 144 MMOL/L (ref 136–145)

## 2023-04-07 ENCOUNTER — OFFICE VISIT (OUTPATIENT)
Dept: PAIN MEDICINE | Facility: CLINIC | Age: 53
End: 2023-04-07
Payer: COMMERCIAL

## 2023-04-07 VITALS
BODY MASS INDEX: 37.95 KG/M2 | SYSTOLIC BLOOD PRESSURE: 132 MMHG | RESPIRATION RATE: 20 BRPM | DIASTOLIC BLOOD PRESSURE: 80 MMHG | OXYGEN SATURATION: 98 % | HEIGHT: 68 IN | WEIGHT: 250.4 LBS | HEART RATE: 80 BPM | TEMPERATURE: 97.3 F

## 2023-04-07 DIAGNOSIS — G54.0 BRACHIAL PLEXUS NEUROPATHY: ICD-10-CM

## 2023-04-07 DIAGNOSIS — Q85.00 NEUROFIBROMATOSIS: ICD-10-CM

## 2023-04-07 DIAGNOSIS — G89.4 CHRONIC PAIN SYNDROME: Primary | ICD-10-CM

## 2023-04-07 DIAGNOSIS — Z79.899 ENCOUNTER FOR LONG-TERM (CURRENT) USE OF HIGH-RISK MEDICATION: ICD-10-CM

## 2023-04-07 DIAGNOSIS — M79.2 NEUROPATHIC PAIN, ARM: ICD-10-CM

## 2023-04-07 DIAGNOSIS — M54.2 NECK PAIN: ICD-10-CM

## 2023-04-07 PROCEDURE — 99214 OFFICE O/P EST MOD 30 MIN: CPT | Performed by: NURSE PRACTITIONER

## 2023-04-07 NOTE — PROGRESS NOTES
CHIEF COMPLAINT  Neck and arm pain      Subjective   Isma De Luna is a 53 y.o. male  who presents for follow-up.  He has a history of arm pain, neck pain, neurofibromatosis.  Today his pain is 6/10VAS in severity. Taking hydrocodone 10/325 which utilizes extremely sparingly (still has medication left from refill 5 months ago). He also continues with Horizant 600 mg BID, Cymbalta 60 mg daily. Reports moderate pain reduction with regimen and denies adverse reactions. He continues working full time.      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 He is a patient at the [facility].  Had surgery on the left side a couple of years ago. He is post-op (4/13/2022) removal of right brachial plexus tumors.  Overall he has improved significantly. still needs another surgery for lesion in the right axilla.  Monitoring with right brachial plexus MRI every 6 months.      Neck Pain   This is a chronic problem. The current episode started more than 1 month ago. The problem occurs constantly. The problem has been unchanged. The pain is present in the midline and right side. The quality of the pain is described as aching, cramping and shooting. The pain is at a severity of 3/10. The pain is mild. The symptoms are aggravated by twisting. Associated symptoms include headaches and numbness (arms). Pertinent negatives include no chest pain, fever or weakness. He has tried acetaminophen, bed rest, heat, ice, NSAIDs, oral narcotics, muscle relaxants, neck support and home exercises (hydrocodone) for the symptoms. The treatment provided moderate relief.   Arm Pain   The pain is present in the left hand, right hand, upper left arm and upper right arm. The quality of the pain is described as aching (tingling). The pain radiates to the right arm and right hand. The pain is at a severity of 3/10. The pain is mild. The pain has been fluctuating since the incident. Associated symptoms include numbness (arms).  "Pertinent negatives include no chest pain.      PEG Assessment   What number best describes your pain on average in the past week?3  What number best describes how, during the past week, pain has interfered with your enjoyment of life?1  What number best describes how, during the past week, pain has interfered with your general activity?  1    The following portions of the patient's history were reviewed and updated as appropriate: allergies, current medications, past family history, past medical history, past social history, past surgical history and problem list.    Review of Systems   Constitutional: Positive for activity change. Negative for fatigue and fever.   HENT: Negative for congestion.    Eyes: Negative for visual disturbance.   Respiratory: Negative for cough and chest tightness.    Cardiovascular: Negative for chest pain.   Gastrointestinal: Positive for abdominal pain (numbness center ). Negative for constipation and diarrhea.   Genitourinary: Negative for difficulty urinating and dysuria.   Musculoskeletal: Positive for neck pain.   Neurological: Positive for numbness (arms) and headaches. Negative for dizziness, weakness and light-headedness.   Psychiatric/Behavioral: Positive for sleep disturbance. Negative for agitation and suicidal ideas. The patient is not nervous/anxious.      I have reviewed and confirmed the accuracy of the ROS as documented by the MA/LPN/RN EMILIE Shrestha    Vitals:    04/07/23 1529   BP: 132/80   BP Location: Right arm   Patient Position: Sitting   Cuff Size: Large Adult   Pulse: 80   Resp: 20   Temp: 97.3 °F (36.3 °C)   TempSrc: Temporal   SpO2: 98%   Weight: 114 kg (250 lb 6.4 oz)   Height: 172.7 cm (68\")   PainSc:   3         Objective   Physical Exam  Vitals and nursing note reviewed.   Constitutional:       General: He is not in acute distress.     Appearance: Normal appearance. He is not ill-appearing.   Pulmonary:      Effort: Pulmonary effort is normal. No " respiratory distress.   Musculoskeletal:      Right shoulder: Tenderness present.      Left shoulder: Tenderness present.      Right upper arm: Tenderness present.      Right hand: Tenderness present. Decreased strength.      Cervical back: Tenderness present.   Neurological:      Mental Status: He is alert and oriented to person, place, and time.      Motor: No weakness.      Gait: Gait normal.   Psychiatric:         Mood and Affect: Mood normal.         Behavior: Behavior normal.       Assessment & Plan   Diagnoses and all orders for this visit:    1. Chronic pain syndrome (Primary)    2. Brachial plexus neuropathy    3. Neurofibromatosis    4. Encounter for long-term (current) use of high-risk medication    5. Neck pain    6. Neuropathic pain, arm      --- Continue Hydrocodone. Refill is not needed.  Patient appears stable with current regimen. No adverse effects. Regarding continuation of opioids, there is no evidence of aberrant behavior or any red flags.  The patient continues with appropriate response to opioid therapy. ADL's remain intact by self.   --- The urine drug screen confirmation from 12/2/2022 has been reviewed and the result is appropriate based on patient history and BIRGIT report     --- The patient signed an updated copy of the controlled substance agreement on 5/17/2022  --- Follow-up 3 months/sooner if needed          BIRGIT REPORT  As part of the patient's treatment plan, I am prescribing controlled substances. The patient has been made aware of appropriate use of such medications, including potential risk of somnolence, limited ability to drive and/or work safely, and the potential for dependence or overdose. It has also been made clear that these medications are for use by this patient only, without concomitant use of alcohol or other substances unless prescribed.     Patient has completed prescribing agreement detailing terms of continued prescribing of controlled substances, including  monitoring BIRGIT reports, urine drug screening, and pill counts if necessary. The patient is aware that inappropriate use will results in cessation of prescribing such medications.    As the clinician, I personally reviewed the BIRGIT from 4/7/2023 while the patient was in the office today.    History and physical exam exhibit continued safe and appropriate use of controlled substances.     Dictated utilizing Dragon dictation.

## 2023-05-04 ENCOUNTER — TELEPHONE (OUTPATIENT)
Dept: PAIN MEDICINE | Facility: CLINIC | Age: 53
End: 2023-05-04
Payer: COMMERCIAL

## 2023-05-04 DIAGNOSIS — G54.0 BRACHIAL PLEXUS NEUROPATHY: ICD-10-CM

## 2023-05-04 DIAGNOSIS — G89.4 CHRONIC PAIN SYNDROME: ICD-10-CM

## 2023-05-04 RX ORDER — HYDROCODONE BITARTRATE AND ACETAMINOPHEN 10; 325 MG/1; MG/1
1 TABLET ORAL EVERY 6 HOURS PRN
Qty: 60 TABLET | Refills: 0 | Status: SHIPPED | OUTPATIENT
Start: 2023-05-04

## 2023-05-04 NOTE — TELEPHONE ENCOUNTER
Medication Refill Request    Date of phone call: 5-4-23    Medication being requested: norco 5-325 mg  sig: Take 1 tablet by mouth Every 6 (Six) Hours As Needed for Severe Pain.,    Qty: 60    Date of last visit: 4-7-23    Date of last refill:     BIRGIT up to date?:     Next Follow up?: 7-7-23    Any new pertinent information? (i.e, new medication allergies, new use of medications, change in patient's health or condition, non-compliance or inconsistency with prescribing agreement?):

## 2023-08-09 DIAGNOSIS — I10 HYPERTENSION, BENIGN: ICD-10-CM

## 2023-08-09 DIAGNOSIS — K21.9 GASTROESOPHAGEAL REFLUX DISEASE WITHOUT ESOPHAGITIS: ICD-10-CM

## 2023-08-09 DIAGNOSIS — Z87.39 HISTORY OF GOUT: ICD-10-CM

## 2023-08-09 RX ORDER — GABAPENTIN ENACARBIL 600 MG/1
TABLET, EXTENDED RELEASE ORAL
Qty: 60 TABLET | Refills: 5 | Status: SHIPPED | OUTPATIENT
Start: 2023-08-09

## 2023-08-10 RX ORDER — LOSARTAN POTASSIUM 100 MG/1
100 TABLET ORAL DAILY
Qty: 90 TABLET | Refills: 1 | Status: SHIPPED | OUTPATIENT
Start: 2023-08-10

## 2023-08-10 RX ORDER — OMEPRAZOLE 40 MG/1
40 CAPSULE, DELAYED RELEASE ORAL DAILY
Qty: 90 CAPSULE | Refills: 1 | Status: SHIPPED | OUTPATIENT
Start: 2023-08-10

## 2023-08-10 RX ORDER — FEBUXOSTAT 40 MG/1
40 TABLET, FILM COATED ORAL DAILY
Qty: 90 TABLET | Refills: 1 | Status: SHIPPED | OUTPATIENT
Start: 2023-08-10

## 2023-09-15 ENCOUNTER — OFFICE VISIT (OUTPATIENT)
Dept: INTERNAL MEDICINE | Facility: CLINIC | Age: 53
End: 2023-09-15
Payer: COMMERCIAL

## 2023-09-15 VITALS
OXYGEN SATURATION: 99 % | WEIGHT: 237 LBS | TEMPERATURE: 98.1 F | DIASTOLIC BLOOD PRESSURE: 82 MMHG | HEART RATE: 77 BPM | HEIGHT: 68 IN | SYSTOLIC BLOOD PRESSURE: 134 MMHG | BODY MASS INDEX: 35.92 KG/M2

## 2023-09-15 DIAGNOSIS — U09.9 POST-COVID-19 CONDITION: Primary | ICD-10-CM

## 2023-09-15 PROCEDURE — 99213 OFFICE O/P EST LOW 20 MIN: CPT | Performed by: STUDENT IN AN ORGANIZED HEALTH CARE EDUCATION/TRAINING PROGRAM

## 2023-09-15 NOTE — PROGRESS NOTES
"  Eloy Bryant D.O.  Internal Medicine  [facility]  [address], [address]  [place] [address]  [phone]      Chief Complaint  Follow-up from COVID (Left ear popping. Stabbing pain behind left eye. ) and Fatigue    SUBJECTIVE    History of Present Illness    Isma De Luna is a 53 y.o. male who presents to the office today as an established patient that last saw me on 6/30/2023.   Here today for acute care visit.   Pt states he had COVID 19 with symptoms starting August 23 2023 while in [place]. He reports losing taste and smell and had head congestion. He was taking Mucinex.   On September 5 he went to urgent care because his left ear felt muffled and he was started on a course of Augmentin. States he still feels light sensitivity in the left eye as well as some pain in the upper left teeth. States his ear still feels muffled.   Cough is gone, nasal congestion is gone. He still feels some fatigue \"Like someone pulled the plug on me\". He uses flonase nasal spray.   States he took his temperature yesterday because he felt flushed but it was normal.   Allergies   Allergen Reactions    Molds & Smuts Other (See Comments)     Congestion, sneezing    Nsaids Other (See Comments)     HX: Kidney CA; radical left nephrectomy, partial right.    Pollen Extract Other (See Comments)     Other reaction(s): Other (See Comments)  Pollen, rag weed   Other reaction(s): Other (See Comments)  Pollen, rag weed         Outpatient Medications Marked as Taking for the 9/15/23 encounter (Office Visit) with Eloy Bryant, DO   Medication Sig Dispense Refill    amLODIPine (NORVASC) 10 MG tablet TAKE 1 TABLET BY MOUTH DAILY 90 tablet 1    DULoxetine (CYMBALTA) 60 MG capsule TK 1 C PO D      febuxostat (ULORIC) 40 MG tablet TAKE 1 TABLET BY MOUTH DAILY 90 tablet 1    fexofenadine (ALLEGRA) 180 MG tablet Take 1 tablet by mouth Daily.      fluticasone (VERAMYST) 27.5 MCG/SPRAY nasal spray 2 sprays into the nostril(s) as " "directed by provider Daily.      Horizant 600 MG tablet controlled-release TAKE 1 TABLET BY MOUTH 2 (TWO) TIMES A DAY. 60 tablet 5    HYDROcodone-acetaminophen (NORCO)  MG per tablet Take 1 tablet by mouth Every 6 (Six) Hours As Needed for Severe Pain. 60 tablet 0    losartan (COZAAR) 100 MG tablet TAKE 1 TABLET BY MOUTH DAILY 90 tablet 1    Multiple Vitamin (MULTI-VITAMIN DAILY PO) Take  by mouth Daily.      omeprazole (priLOSEC) 40 MG capsule TAKE 1 CAPSULE BY MOUTH DAILY 90 capsule 1    propranolol LA (INDERAL LA) 80 MG 24 hr capsule TAKE 1 CAPSULE BY MOUTH DAILY 90 capsule 1    sildenafil (REVATIO) 20 MG tablet Take 1 tablet by mouth As Needed.          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   Constitutional:       General: He is not in acute distress.     Appearance: He is obese. He is not ill-appearing.   HENT:      Head: Atraumatic.      Comments: No tenderness to percussion of the sinuses.     Right Ear: Tympanic membrane, ear canal and external ear normal. There is no impacted cerumen.      Ears:      Comments: Left ear with partial cerumen impaction.  The visualized portions of the TM appear normal.     Mouth/Throat:      Mouth: Mucous membranes are moist.      Pharynx: Oropharynx is clear. No oropharyngeal exudate or posterior oropharyngeal erythema.   Eyes:      General: No " scleral icterus.        Right eye: No discharge.         Left eye: No discharge.      Extraocular Movements: Extraocular movements intact.      Pupils: Pupils are equal, round, and reactive to light.   Skin:     Coloration: Skin is not jaundiced.   Neurological:      Mental Status: He is alert.   Psychiatric:         Mood and Affect: Mood normal.         Behavior: Behavior normal.         Thought Content: Thought content normal.                           ASSESSMENT & PLAN     Diagnoses and all orders for this visit:    1. 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  He reports some residual headaches on the left side as well as some left-sided sinus pressure.  He denies fever or chills.  He is already seen urgent care and was prescribed Augmentin for what seems to have been a suspected ear infection on the left.  From what I can assess today due to cerumen impaction, this appears to have resolved.  He took his last dose of antibiotic today.  At this point I recommended additional time for him to recover.  Advised that post COVID fatigue as an unpredictable course.  At this point he can switch his Allegra to Allegra-D and also add in Afrin nasal spray for some eustachian tube dysfunction symptoms.  He already takes Flonase nasal spray.  If symptoms are persistent throughout the weekend or if he gets worse he can contact me for further recommendations.  This point I do not believe he needs retreatment antibiotics.        The following social determinates of health impact the patient's medical decision making: No social determinates of health were factored in to today's visit.     Follow Up  No follow-ups on file.    Patient/family had no further questions at this time and verbalized understanding of the plan discussed today.

## 2023-09-27 ENCOUNTER — OFFICE VISIT (OUTPATIENT)
Dept: INTERNAL MEDICINE | Facility: CLINIC | Age: 53
End: 2023-09-27
Payer: COMMERCIAL

## 2023-09-27 VITALS
HEART RATE: 68 BPM | DIASTOLIC BLOOD PRESSURE: 94 MMHG | WEIGHT: 235 LBS | HEIGHT: 68 IN | BODY MASS INDEX: 35.61 KG/M2 | SYSTOLIC BLOOD PRESSURE: 130 MMHG | OXYGEN SATURATION: 97 %

## 2023-09-27 DIAGNOSIS — I10 ESSENTIAL HYPERTENSION: Primary | ICD-10-CM

## 2023-09-27 DIAGNOSIS — E66.01 CLASS 2 SEVERE OBESITY DUE TO EXCESS CALORIES WITH SERIOUS COMORBIDITY AND BODY MASS INDEX (BMI) OF 35.0 TO 35.9 IN ADULT: ICD-10-CM

## 2023-09-27 RX ORDER — PROPRANOLOL HYDROCHLORIDE 120 MG/1
120 CAPSULE, EXTENDED RELEASE ORAL DAILY
Qty: 90 CAPSULE | Refills: 0 | Status: SHIPPED | OUTPATIENT
Start: 2023-09-27

## 2023-09-27 NOTE — PROGRESS NOTES
Eloy Bryant D.O.  Internal Medicine  [facility]  [address], [address]  [place] [address]  [phone]      Chief Complaint  Hypertension    SUBJECTIVE    History of Present Illness    Isma De Luna is a 53 y.o. male who presents to the office today as an established patient that last saw me on 9/15/2023.     HTN: takes amlodipine 10 mg daily, propranolol LA 80 mg daily, losartan 100 mg daily. He has been checking BP at home intermittently. In July , average systolic was in the upper 120s systolic and diastolic was in the 80s. In September he has 2 readings, one 134/85 and 138/87.     Allergies   Allergen Reactions    Molds & Smuts Other (See Comments)     Congestion, sneezing    Nsaids Other (See Comments)     HX: Kidney CA; radical left nephrectomy, partial right.    Pollen Extract Other (See Comments)     Other reaction(s): Other (See Comments)  Pollen, rag weed   Other reaction(s): Other (See Comments)  Pollen, rag weed         Outpatient Medications Marked as Taking for the 9/27/23 encounter (Office Visit) with Eloy Bryant, DO   Medication Sig Dispense Refill    amLODIPine (NORVASC) 10 MG tablet TAKE 1 TABLET BY MOUTH DAILY 90 tablet 1    DULoxetine (CYMBALTA) 60 MG capsule TK 1 C PO D      febuxostat (ULORIC) 40 MG tablet TAKE 1 TABLET BY MOUTH DAILY 90 tablet 1    fexofenadine (ALLEGRA) 180 MG tablet Take 1 tablet by mouth Daily.      fluticasone (VERAMYST) 27.5 MCG/SPRAY nasal spray 2 sprays into the nostril(s) as directed by provider Daily.      Horizant 600 MG tablet controlled-release TAKE 1 TABLET BY MOUTH 2 (TWO) TIMES A DAY. 60 tablet 5    HYDROcodone-acetaminophen (NORCO)  MG per tablet Take 1 tablet by mouth Every 6 (Six) Hours As Needed for Severe Pain. 60 tablet 0    losartan (COZAAR) 100 MG tablet TAKE 1 TABLET BY MOUTH DAILY 90 tablet 1    Multiple Vitamin (MULTI-VITAMIN DAILY PO) Take  by mouth Daily.      omeprazole (priLOSEC) 40 MG capsule TAKE 1 CAPSULE  "BY MOUTH DAILY 90 capsule 1    sildenafil (REVATIO) 20 MG tablet Take 1 tablet by mouth As Needed.      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   Constitutional:       General: He is not in acute distress.     Appearance: Normal appearance. He is obese. He is not ill-appearing.   HENT:      Head: Normocephalic and atraumatic.   Eyes:      General: No scleral icterus.  Cardiovascular:      Rate and Rhythm: Normal rate and regular rhythm.      Heart sounds: Normal heart sounds. No murmur heard.  Pulmonary:      Effort: Pulmonary effort is normal. No respiratory distress.      Breath sounds: Normal breath sounds. No wheezing or rhonchi.   Musculoskeletal:      Right lower leg: No edema.      Left lower leg: No edema.   Skin:     Coloration: Skin is not jaundiced.   Neurological:      Mental Status: He is alert.   Psychiatric:         Mood and Affect: Mood normal.         Behavior: Behavior normal.         Thought Content: Thought content normal.                           ASSESSMENT & PLAN     Diagnoses and all orders for this visit:    1. Essential hypertension (Primary)  2. Class 2 severe obesity due to excess calories with serious comorbidity and body mass index (BMI) of 35.0 to 35.9 in adult  - takes amlodipine 10 mg " daily, propranolol LA 80 mg daily, losartan 100 mg daily. He has been checking BP at home intermittently. In July , average systolic was in the upper 120s systolic and diastolic was in the 80s. In September he has 2 readings, one 134/85 and 138/87.   -uncontrolled BP at 130/94  -at this point recommend increased propranolol LA to 120 mg daily; also recommended he get back into his exercise and diet regimen  Class 2 Severe Obesity (BMI >=35 and <=39.9). Obesity-related health conditions include the following: diabetes mellitus and dyslipidemias. Obesity is unchanged. BMI is is above average; BMI management plan is completed. We discussed portion control, increasing exercise, and GLP1 RA therapy such as Wegovy. We generally discussed Wegovy side effects and use of the medication. At this point he wants to consider Wegovy and will let me know if he decides to start treatment. .      -     propranolol LA (INDERAL LA) 120 MG 24 hr capsule; Take 1 capsule by mouth Daily.  Dispense: 90 capsule; Refill: 0            The following social determinates of health impact the patient's medical decision making: No social determinates of health were factored in to today's visit.     Follow Up  Return in about 3 months (around 12/27/2023) for Recheck.    Patient/family had no further questions at this time and verbalized understanding of the plan discussed today.

## 2023-10-05 DIAGNOSIS — M79.2 NEUROPATHIC PAIN, ARM: ICD-10-CM

## 2023-10-05 RX ORDER — CYCLOBENZAPRINE HCL 10 MG
10 TABLET ORAL 3 TIMES DAILY PRN
Qty: 90 TABLET | Refills: 1 | Status: SHIPPED | OUTPATIENT
Start: 2023-10-05

## 2023-10-06 DIAGNOSIS — G89.4 CHRONIC PAIN SYNDROME: ICD-10-CM

## 2023-10-06 DIAGNOSIS — G54.0 BRACHIAL PLEXUS NEUROPATHY: ICD-10-CM

## 2023-10-06 RX ORDER — HYDROCODONE BITARTRATE AND ACETAMINOPHEN 10; 325 MG/1; MG/1
1 TABLET ORAL EVERY 6 HOURS PRN
Qty: 60 TABLET | Refills: 0 | Status: SHIPPED | OUTPATIENT
Start: 2023-10-06

## 2023-10-06 NOTE — TELEPHONE ENCOUNTER
Caller: RANDEE GIBSON     Relationship: PATIENT     Best call back number: [phone]    Requested Prescriptions:   Requested Prescriptions     Pending Prescriptions Disp Refills    HYDROcodone-acetaminophen (NORCO)  MG per tablet 60 tablet 0     Sig: Take 1 tablet by mouth Every 6 (Six) Hours As Needed for Severe Pain.        Pharmacy where request should be sent:    [id number] - [place], [place] - [address] AT [address] - [phone]  - [phone]    [address] [address]   Phone: [phone] Fax: [phone]   Hours: Not open 24 hours       Last office visit with prescribing clinician: 7/20/2023   Last telemedicine visit with prescribing clinician: Visit date not found   Next office visit with prescribing clinician: 10/19/2023     Additional details provided by patient: REF    Does the patient have less than a 3 day supply:  [] Yes  [x] No    Would you like a call back once the refill request has been completed: [] Yes [x] No    If the office needs to give you a call back, can they leave a voicemail: [] Yes [x] No    Christi Gómez Rep   10/06/23 13:04 EDT

## 2023-10-19 ENCOUNTER — OFFICE VISIT (OUTPATIENT)
Dept: PAIN MEDICINE | Facility: CLINIC | Age: 53
End: 2023-10-19
Payer: COMMERCIAL

## 2023-10-19 VITALS
BODY MASS INDEX: 36.07 KG/M2 | HEART RATE: 63 BPM | RESPIRATION RATE: 18 BRPM | TEMPERATURE: 98.1 F | OXYGEN SATURATION: 97 % | WEIGHT: 238 LBS | SYSTOLIC BLOOD PRESSURE: 130 MMHG | DIASTOLIC BLOOD PRESSURE: 78 MMHG | HEIGHT: 68 IN

## 2023-10-19 DIAGNOSIS — M79.2 NEUROPATHIC PAIN, ARM: ICD-10-CM

## 2023-10-19 DIAGNOSIS — Z79.899 ENCOUNTER FOR LONG-TERM (CURRENT) USE OF HIGH-RISK MEDICATION: ICD-10-CM

## 2023-10-19 DIAGNOSIS — G54.0 BRACHIAL PLEXUS NEUROPATHY: ICD-10-CM

## 2023-10-19 DIAGNOSIS — Q85.00 NEUROFIBROMATOSIS: ICD-10-CM

## 2023-10-19 DIAGNOSIS — G89.4 CHRONIC PAIN SYNDROME: Primary | ICD-10-CM

## 2023-10-19 PROCEDURE — 99214 OFFICE O/P EST MOD 30 MIN: CPT | Performed by: NURSE PRACTITIONER

## 2023-10-19 NOTE — PROGRESS NOTES
CHIEF COMPLAINT  Neck,arm pain  Pain is consistent.    Subjective   Isma De Luna is a 53 y.o. male  who presents for follow-up.  He has a history of chronic neck and arm pain related to neurofibromatosis.  Today his pain is 1/10VAS in severity. Taking hydrocodone 10/325 which utilizes extremely sparingly, medication typically lasts 4-5 months. He also continues with Horizant 600 mg BID, Cymbalta 60 mg daily. Reports moderate pain reduction with regimen and denies adverse reactions. He continues working full time.       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 He is a patient at the [facility] but also follows with local team neurology/neuro oncology/radiation oncology.  He has had multiple surgeries to remove tumors related to the disease, most recent was 4/13/2022 removal of right brachial plexus tumors.  Surveillance MRI's of the spine and brachial plexus every 3-6 months.  he has also had successful radiation treatment to one of the schwannomas.     Neck Pain   This is a chronic problem. The current episode started more than 1 month ago. The problem occurs constantly. The problem has been waxing and waning. The pain is present in the midline and right side. The quality of the pain is described as aching, cramping and shooting. The pain is at a severity of 1/10. The pain is mild. The symptoms are aggravated by twisting. Associated symptoms include numbness. Pertinent negatives include no chest pain, fever, headaches or weakness. He has tried acetaminophen, bed rest, heat, ice, NSAIDs, oral narcotics, muscle relaxants, neck support and home exercises (hydrocodone) for the symptoms. The treatment provided moderate relief.   Arm Pain   The pain is present in the left hand, right hand, upper left arm and upper right arm. The quality of the pain is described as aching (tingling). The pain radiates to the right arm and right hand. The pain is at a severity of 1/10.  "The pain is mild. The pain has been Fluctuating since the incident. Associated symptoms include numbness. Pertinent negatives include no chest pain.      PEG Assessment   What number best describes your pain on average in the past week?3  What number best describes how, during the past week, pain has interfered with your enjoyment of life?0  What number best describes how, during the past week, pain has interfered with your general activity?  1    Review of Pertinent Medical Data ---    The following portions of the patient's history were reviewed and updated as appropriate: allergies, current medications, past family history, past medical history, past social history, past surgical history, and problem list.    Review of Systems   Constitutional:  Negative for activity change, fatigue and fever.   HENT:  Negative for congestion.    Respiratory:  Negative for cough and chest tightness.    Cardiovascular:  Negative for chest pain.   Gastrointestinal:  Positive for constipation. Negative for abdominal pain and diarrhea.   Genitourinary:  Negative for difficulty urinating and dysuria.   Musculoskeletal:  Positive for neck pain.   Neurological:  Positive for numbness. Negative for dizziness, weakness, light-headedness and headaches.   Psychiatric/Behavioral:  Positive for agitation and sleep disturbance. Negative for suicidal ideas. The patient is nervous/anxious.        I have reviewed and confirmed the accuracy of the ROS as documented by the MA/LPN/RN EMILIE Shrestha    Vitals:    10/19/23 1458   BP: 130/78   BP Location: Left arm   Patient Position: Sitting   Cuff Size: Adult   Pulse: 63   Resp: 18   Temp: 98.1 °F (36.7 °C)   TempSrc: Temporal   SpO2: 97%   Weight: 108 kg (238 lb)   Height: 172.7 cm (68\")   PainSc:   1         Objective   Physical Exam  Vitals and nursing note reviewed.   Constitutional:       General: He is not in acute distress.     Appearance: Normal appearance. He is not ill-appearing. "   Pulmonary:      Effort: Pulmonary effort is normal. No respiratory distress.   Musculoskeletal:      Right shoulder: Tenderness present.      Right upper arm: Tenderness present.      Right hand: Tenderness present. Decreased strength.      Cervical back: Tenderness and bony tenderness present.   Neurological:      Mental Status: He is alert and oriented to person, place, and time.      Motor: No weakness.      Gait: Gait is intact. Gait normal.   Psychiatric:         Mood and Affect: Mood normal.         Behavior: Behavior normal.       Assessment & Plan   Diagnoses and all orders for this visit:    1. Chronic pain syndrome (Primary)    2. Neurofibromatosis    3. Neuropathic pain, arm    4. Brachial plexus neuropathy    5. Encounter for long-term (current) use of high-risk medication      --- Continue Hydrocodone. Patient appears stable with current regimen. No adverse effects. Regarding continuation of opioids, there is no evidence of aberrant behavior or any red flags.  The patient continues with appropriate response to opioid therapy. ADL's remain intact by self.   --- The patient signed an updated copy of the controlled substance agreement on 7/20/2023  --- Low risk for opioid abuse/misuse   --- The urine drug screen confirmation from 7/20/2023 has been reviewed and the result is appropriate based on patient history and BIRGIT report    Isma De Luna reports a pain score of 1.  Given his pain assessment as noted, treatment options were discussed and the following options were decided upon as a follow-up plan to address the patient's pain:  see plan above .    --- Follow-up see plan above          BIRGIT REPORT  As part of the patient's treatment plan, I am prescribing controlled substances. The patient has been made aware of appropriate use of such medications, including potential risk of somnolence, limited ability to drive and/or work safely, and the potential for dependence or overdose. It has also been  made clear that these medications are for use by this patient only, without concomitant use of alcohol or other substances unless prescribed.     Patient has completed prescribing agreement detailing terms of continued prescribing of controlled substances, including monitoring BIRGIT reports, urine drug screening, and pill counts if necessary. The patient is aware that inappropriate use will results in cessation of prescribing such medications.    As the clinician, I personally reviewed the BIRGIT from 10/19/2023 while the patient was in the office today.    History and physical exam exhibit continued safe and appropriate use of controlled substances.       Dictated utilizing Dragon dictation.

## 2023-12-13 DIAGNOSIS — I10 ESSENTIAL HYPERTENSION: ICD-10-CM

## 2023-12-14 RX ORDER — AMLODIPINE BESYLATE 10 MG/1
10 TABLET ORAL DAILY
Qty: 90 TABLET | Refills: 1 | Status: SHIPPED | OUTPATIENT
Start: 2023-12-14

## 2023-12-14 RX ORDER — PROPRANOLOL HYDROCHLORIDE 120 MG/1
120 CAPSULE, EXTENDED RELEASE ORAL DAILY
Qty: 90 CAPSULE | Refills: 0 | Status: SHIPPED | OUTPATIENT
Start: 2023-12-14

## 2023-12-29 ENCOUNTER — OFFICE VISIT (OUTPATIENT)
Dept: INTERNAL MEDICINE | Facility: CLINIC | Age: 53
End: 2023-12-29
Payer: COMMERCIAL

## 2023-12-29 VITALS
WEIGHT: 239 LBS | BODY MASS INDEX: 36.22 KG/M2 | HEART RATE: 67 BPM | OXYGEN SATURATION: 97 % | SYSTOLIC BLOOD PRESSURE: 130 MMHG | HEIGHT: 68 IN | DIASTOLIC BLOOD PRESSURE: 80 MMHG

## 2023-12-29 DIAGNOSIS — Z11.4 ENCOUNTER FOR SCREENING FOR HIV: ICD-10-CM

## 2023-12-29 DIAGNOSIS — R73.03 PREDIABETES: ICD-10-CM

## 2023-12-29 DIAGNOSIS — Z11.59 NEED FOR HEPATITIS C SCREENING TEST: ICD-10-CM

## 2023-12-29 DIAGNOSIS — Z11.3 ROUTINE SCREENING FOR STI (SEXUALLY TRANSMITTED INFECTION): ICD-10-CM

## 2023-12-29 DIAGNOSIS — I10 ESSENTIAL HYPERTENSION: Primary | ICD-10-CM

## 2023-12-29 DIAGNOSIS — E66.01 CLASS 2 SEVERE OBESITY DUE TO EXCESS CALORIES WITH SERIOUS COMORBIDITY AND BODY MASS INDEX (BMI) OF 35.0 TO 35.9 IN ADULT: ICD-10-CM

## 2023-12-29 NOTE — PROGRESS NOTES
Eloy Bryant D.O.  Internal Medicine  [facility]  [address], [address]  [place] [address]  [phone]      Chief Complaint  Hypertension    SUBJECTIVE    History of Present Illness    Isma De Luna is a 53 y.o. male who presents to the office today as an established patient that last saw me on 9/27/2023.     HTN: takes amlodipine 10 mg daily, propranolol  mg daily, losartan 100 mg daily. Checks BP occasionally at home. December average BP over 4 entries is 126/78, November average over 7 entries is 133/85, and October average over 9 entries is 126/78. Not getting exercise in.     Allergies   Allergen Reactions    Molds & Smuts Other (See Comments)     Congestion, sneezing    Nsaids Other (See Comments)     HX: Kidney CA; radical left nephrectomy, partial right.    Pollen Extract Other (See Comments)     Other reaction(s): Other (See Comments)  Pollen, rag weed   Other reaction(s): Other (See Comments)  Pollen, rag weed         Outpatient Medications Marked as Taking for the 12/29/23 encounter (Office Visit) with Eloy Bryant, DO   Medication Sig Dispense Refill    amLODIPine (NORVASC) 10 MG tablet TAKE 1 TABLET BY MOUTH DAILY 90 tablet 1    cyclobenzaprine (FLEXERIL) 10 MG tablet Take 1 tablet by mouth 3 (Three) Times a Day As Needed for Muscle Spasms. for muscle spams 90 tablet 1    DULoxetine (CYMBALTA) 60 MG capsule TK 1 C PO D      febuxostat (ULORIC) 40 MG tablet TAKE 1 TABLET BY MOUTH DAILY 90 tablet 1    fexofenadine (ALLEGRA) 180 MG tablet Take 1 tablet by mouth Daily.      fluticasone (VERAMYST) 27.5 MCG/SPRAY nasal spray 2 sprays into the nostril(s) as directed by provider Daily.      Horizant 600 MG tablet controlled-release TAKE 1 TABLET BY MOUTH 2 (TWO) TIMES A DAY. 60 tablet 5    HYDROcodone-acetaminophen (NORCO)  MG per tablet Take 1 tablet by mouth Every 6 (Six) Hours As Needed for Severe Pain. 60 tablet 0    losartan (COZAAR) 100 MG tablet TAKE 1 TABLET  "BY MOUTH DAILY 90 tablet 1    Multiple Vitamin (MULTI-VITAMIN DAILY PO) Take  by mouth Daily.      omeprazole (priLOSEC) 40 MG capsule TAKE 1 CAPSULE BY MOUTH DAILY 90 capsule 1    propranolol LA (INDERAL LA) 120 MG 24 hr capsule TAKE 1 CAPSULE BY MOUTH DAILY 90 capsule 0    sildenafil (REVATIO) 20 MG tablet Take 1 tablet by mouth As Needed.          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   Constitutional:       General: He is not in acute distress.     Appearance: He is obese. He is not ill-appearing.   Eyes:      General: No scleral icterus.  Cardiovascular:      Rate and Rhythm: Normal rate and regular rhythm.      Heart sounds: Normal heart sounds. No murmur heard.  Pulmonary:      Effort: Pulmonary effort is normal. No respiratory distress.      Breath sounds: Normal breath sounds. No wheezing.   Musculoskeletal:      Right lower leg: No edema.      Left lower leg: No edema.   Skin:     Coloration: Skin is not jaundiced.   Neurological:      Mental Status: He is alert.   Psychiatric:         Mood and Affect: Mood normal.         Behavior: Behavior normal.         Thought Content: Thought content normal.                             ASSESSMENT & PLAN     Diagnoses and all orders for this visit:    1. Essential hypertension (Primary)  - takes amlodipine 10 mg daily, propranolol  mg daily, losartan 100 mg daily. Checks BP " occasionally at home. December average BP over 4 entries is 126/78, November average over 7 entries is 133/85, and October average over 9 entries is 126/78. Not getting exercise in.   -BP acceptable control 130/80 in office today, continue current regimen     2. Class 2 severe obesity due to excess calories with serious comorbidity and body mass index (BMI) of 35.0 to 35.9 in adult  3. Prediabetes  Lab Results   Component Value Date    HGBA1C 5.70 (H) 01/04/2023     -continues to consider his options regarding pharmacologic weight loss medication. We again discussed side effects of Wegovy and Zepbound.  -recheck annual A1c to continue monitoring prediabetes.   -recommend he get back to exercising.     4. Encounter for screening for HIV  5. Need for hepatitis C screening test  6. Routine screening for STI (sexually transmitted infection)  -he requests STI screening. He has same sex sexual partners. Declines rectal STI screening due to not having receptive intercourse since last testing.   -     HIV-1 / O / 2 Ag / Antibody  -     Hepatitis C Antibody  -     Chlamydia trachomatis, Neisseria gonorrhoeae, PCR w/ confirmation - Urine, Urine, Clean Catch  -     Neisseria gonorrhoeae, Pharyngeal Swab, SHUKRI - Swab, Oropharynx            The following social determinates of health impact the patient's medical decision making: No social determinates of health were factored in to today's visit.     Follow Up  No follow-ups on file.    Patient/family had no further questions at this time and verbalized understanding of the plan discussed today.

## 2024-01-01 LAB
C TRACH RRNA SPEC QL NAA+PROBE: NEGATIVE
HBA1C MFR BLD: 5.6 % (ref 4.8–5.6)
HCV IGG SERPL QL IA: NON REACTIVE
HIV 1+2 AB+HIV1 P24 AG SERPL QL IA: NON REACTIVE
N GONORRHOEA RRNA SPEC QL NAA+PROBE: NEGATIVE

## 2024-01-03 LAB — N GONORRHOEA RRNA NPH QL NAA+PROBE: NEGATIVE

## 2024-01-19 ENCOUNTER — OFFICE VISIT (OUTPATIENT)
Dept: PAIN MEDICINE | Facility: CLINIC | Age: 54
End: 2024-01-19
Payer: COMMERCIAL

## 2024-01-19 VITALS
OXYGEN SATURATION: 97 % | SYSTOLIC BLOOD PRESSURE: 130 MMHG | WEIGHT: 241.4 LBS | DIASTOLIC BLOOD PRESSURE: 80 MMHG | TEMPERATURE: 97.5 F | HEIGHT: 68 IN | BODY MASS INDEX: 36.59 KG/M2 | HEART RATE: 69 BPM

## 2024-01-19 DIAGNOSIS — Z79.899 ENCOUNTER FOR LONG-TERM (CURRENT) USE OF HIGH-RISK MEDICATION: ICD-10-CM

## 2024-01-19 DIAGNOSIS — G89.4 CHRONIC PAIN SYNDROME: Primary | ICD-10-CM

## 2024-01-19 DIAGNOSIS — Q85.00 NEUROFIBROMATOSIS: ICD-10-CM

## 2024-01-19 DIAGNOSIS — G54.0 BRACHIAL PLEXUS NEUROPATHY: ICD-10-CM

## 2024-01-19 PROCEDURE — 99214 OFFICE O/P EST MOD 30 MIN: CPT | Performed by: NURSE PRACTITIONER

## 2024-01-19 NOTE — PROGRESS NOTES
CHIEF COMPLAINT  F/U neck and arm pain- patient states that his pain has remained the same since his last visit.     Subjective   Isma De Luna is a 53 y.o. male  who presents for follow-up.  He has a history of neck and arm pain related to neurofibromatosis.  Today his pain is 0/10VAS in severity. Taking hydrocodone 10/325 which utilizes extremely sparingly, medication typically lasts 4-5 months. He also continues with Horizant 600 mg BID, Cymbalta 60 mg daily. Reports moderate pain reduction with regimen and denies adverse reactions. He continues working full time.       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 He is a patient at the [facility] but also follows with local team neurology/neuro oncology/radiation oncology.  He has had multiple surgeries to remove tumors related to the disease, most recent was 4/13/2022 removal of right brachial plexus tumors.  Surveillance MRI's of the spine and brachial plexus every 3-6 months.  he has also had successful radiation treatment to one of the schwannomas.     Neck Pain   This is a chronic problem. The current episode started more than 1 month ago. The problem occurs constantly. The problem has been waxing and waning. The pain is present in the midline and right side. The quality of the pain is described as aching, cramping and shooting. The pain is at a severity of 0/10. The pain is mild. The symptoms are aggravated by twisting. Associated symptoms include numbness (occ). Pertinent negatives include no chest pain, fever, headaches or weakness. He has tried acetaminophen, bed rest, heat, ice, NSAIDs, oral narcotics, muscle relaxants, neck support and home exercises (hydrocodone) for the symptoms. The treatment provided moderate relief.   Arm Pain   The pain is present in the left hand, right hand, upper left arm and upper right arm. The quality of the pain is described as aching (tingling). The pain radiates to the  "right arm and right hand. The pain is at a severity of 0/10. The pain is mild. The pain has been Fluctuating since the incident. Associated symptoms include numbness (occ). Pertinent negatives include no chest pain.        PEG Assessment   What number best describes your pain on average in the past week?2  What number best describes how, during the past week, pain has interfered with your enjoyment of life?0  What number best describes how, during the past week, pain has interfered with your general activity?  0    Review of Pertinent Medical Data ---    The following portions of the patient's history were reviewed and updated as appropriate: allergies, current medications, past family history, past medical history, past social history, past surgical history, and problem list.    Review of Systems   Constitutional:  Negative for activity change (increased), chills, fatigue and fever.   HENT:  Negative for congestion.    Eyes:  Negative for visual disturbance.   Respiratory:  Negative for chest tightness and shortness of breath.    Cardiovascular:  Negative for chest pain.   Gastrointestinal:  Positive for constipation. Negative for abdominal pain and diarrhea.   Genitourinary:  Negative for difficulty urinating and dysuria.   Musculoskeletal:  Positive for neck pain. Negative for back pain.   Neurological:  Positive for numbness (occ). Negative for dizziness, weakness, light-headedness and headaches.   Psychiatric/Behavioral:  Positive for agitation. Negative for sleep disturbance. The patient is not nervous/anxious.        I have reviewed and confirmed the accuracy of the ROS as documented by the MA/LPN/RN EMILIE Shrestha    Vitals:    01/19/24 1438   BP: 130/80   Pulse: 69   Temp: 97.5 °F (36.4 °C)   SpO2: 97%   Weight: 109 kg (241 lb 6.4 oz)   Height: 172.7 cm (68\")   PainSc: 0-No pain         Objective   Physical Exam  Vitals and nursing note reviewed.   Constitutional:       General: He is not in acute " distress.     Appearance: Normal appearance. He is not ill-appearing.   Pulmonary:      Effort: Pulmonary effort is normal. No respiratory distress.   Musculoskeletal:      Right shoulder: Tenderness present.      Right upper arm: Tenderness present.      Right hand: Tenderness present.      Cervical back: Tenderness and bony tenderness present.   Neurological:      Mental Status: He is alert and oriented to person, place, and time.      Motor: No weakness.      Gait: Gait is intact. Gait normal.   Psychiatric:         Mood and Affect: Mood normal.         Behavior: Behavior normal.       Assessment & Plan   Diagnoses and all orders for this visit:    1. Chronic pain syndrome (Primary)    2. Neurofibromatosis    3. Brachial plexus neuropathy    4. Encounter for long-term (current) use of high-risk medication      --- Continue Hydrocodone. Patient appears stable with current regimen. No adverse effects. Regarding continuation of opioids, there is no evidence of aberrant behavior or any red flags.  The patient continues with appropriate response to opioid therapy. ADL's remain intact by self.   --- Continue Horizant  --- The patient signed an updated copy of the controlled substance agreement on 7/20/2023  --- Low risk for opioid abuse/misuse   --- The urine drug screen confirmation from 7/20/2023 has been reviewed and the result is appropriate based on patient history and BIRGIT report    Isma De Luna reports a pain score of 0.  Given his pain assessment as noted, treatment options were discussed and the following options were decided upon as a follow-up plan to address the patient's pain:  see plan .    --- Follow-up 3 months/sooner PRVALERI GENAO REPORT  As part of the patient's treatment plan, I am prescribing controlled substances. The patient has been made aware of appropriate use of such medications, including potential risk of somnolence, limited ability to drive and/or work safely, and the potential for  dependence or overdose. It has also been made clear that these medications are for use by this patient only, without concomitant use of alcohol or other substances unless prescribed.     Patient has completed prescribing agreement detailing terms of continued prescribing of controlled substances, including monitoring BIRGIT reports, urine drug screening, and pill counts if necessary. The patient is aware that inappropriate use will results in cessation of prescribing such medications.    As the clinician, I personally reviewed the BIRGIT from 1/19/2024 while the patient was in the office today.    History and physical exam exhibit continued safe and appropriate use of controlled substances.    Dictated utilizing Dragon dictation.

## 2024-01-31 ENCOUNTER — TELEPHONE (OUTPATIENT)
Dept: INTERNAL MEDICINE | Facility: CLINIC | Age: 54
End: 2024-01-31
Payer: COMMERCIAL

## 2024-01-31 NOTE — TELEPHONE ENCOUNTER
Ammy from [facility] calling in regards to mutual pt- Ammy states pt is coming in for CT scan that Dr. Bryant ordered and they are just needing orders for pts post IV fluids- Ammy states they need the dose, frequency, and rate please        Best call back number for Ammy #[phone]    Fax# [phone]

## 2024-02-02 DIAGNOSIS — I10 HYPERTENSION, BENIGN: ICD-10-CM

## 2024-02-02 DIAGNOSIS — K21.9 GASTROESOPHAGEAL REFLUX DISEASE WITHOUT ESOPHAGITIS: ICD-10-CM

## 2024-02-02 RX ORDER — LOSARTAN POTASSIUM 100 MG/1
100 TABLET ORAL DAILY
Qty: 90 TABLET | Refills: 1 | Status: SHIPPED | OUTPATIENT
Start: 2024-02-02

## 2024-02-02 RX ORDER — OMEPRAZOLE 40 MG/1
40 CAPSULE, DELAYED RELEASE ORAL DAILY
Qty: 90 CAPSULE | Refills: 1 | Status: SHIPPED | OUTPATIENT
Start: 2024-02-02

## 2024-02-02 RX ORDER — GABAPENTIN ENACARBIL 600 MG/1
TABLET, EXTENDED RELEASE ORAL
Qty: 60 TABLET | Refills: 5 | Status: SHIPPED | OUTPATIENT
Start: 2024-02-02

## 2024-02-27 DIAGNOSIS — Z87.39 HISTORY OF GOUT: ICD-10-CM

## 2024-02-27 RX ORDER — FEBUXOSTAT 40 MG/1
40 TABLET, FILM COATED ORAL DAILY
Qty: 90 TABLET | Refills: 1 | Status: SHIPPED | OUTPATIENT
Start: 2024-02-27

## 2024-03-01 DIAGNOSIS — G54.0 BRACHIAL PLEXUS NEUROPATHY: ICD-10-CM

## 2024-03-01 DIAGNOSIS — G89.4 CHRONIC PAIN SYNDROME: ICD-10-CM

## 2024-03-01 RX ORDER — HYDROCODONE BITARTRATE AND ACETAMINOPHEN 10; 325 MG/1; MG/1
1 TABLET ORAL EVERY 6 HOURS PRN
Qty: 60 TABLET | Refills: 0 | Status: SHIPPED | OUTPATIENT
Start: 2024-03-01

## 2024-03-01 NOTE — TELEPHONE ENCOUNTER
Caller: RANDEE GIBSON     Relationship PATIENT      Best call back number: [phone]    Requested Prescriptions:   Requested Prescriptions     Pending Prescriptions Disp Refills    HYDROcodone-acetaminophen (NORCO)  MG per tablet 60 tablet 0     Sig: Take 1 tablet by mouth Every 6 (Six) Hours As Needed for Severe Pain.        Pharmacy where request should be sent:      [id number] - [place], [place] - [address] AT [facility] [address] - [phone]  - [phone] FX [address] [facility] [address] Phone: [phone] Fax: [phone] Hours: Not open 24 hours   Last office visit with prescribing clinician: 1/19/2024   Last telemedicine visit with prescribing clinician: Visit date not found   Next office visit with prescribing clinician: 4/19/2024     Additional details provided by patient: N/A    Does the patient have less than a 3 day supply:  [] Yes  [x] No    Would you like a call back once the refill request has been completed: [] Yes [x] No    If the office needs to give you a call back, can they leave a voicemail: [] Yes [x] No    Christi Gómez Rep   03/01/24 09:15 EST

## 2024-03-18 RX ORDER — PROPRANOLOL HYDROCHLORIDE 120 MG/1
120 CAPSULE, EXTENDED RELEASE ORAL DAILY
Qty: 90 CAPSULE | Refills: 0 | Status: SHIPPED | OUTPATIENT
Start: 2024-03-18

## 2024-04-03 ENCOUNTER — OFFICE VISIT (OUTPATIENT)
Dept: INTERNAL MEDICINE | Facility: CLINIC | Age: 54
End: 2024-04-03
Payer: COMMERCIAL

## 2024-04-03 VITALS
HEIGHT: 68 IN | SYSTOLIC BLOOD PRESSURE: 140 MMHG | HEART RATE: 63 BPM | WEIGHT: 245.2 LBS | RESPIRATION RATE: 14 BRPM | TEMPERATURE: 98.7 F | DIASTOLIC BLOOD PRESSURE: 90 MMHG | OXYGEN SATURATION: 97 % | BODY MASS INDEX: 37.16 KG/M2

## 2024-04-03 DIAGNOSIS — Z11.4 ENCOUNTER FOR SCREENING FOR HIV: ICD-10-CM

## 2024-04-03 DIAGNOSIS — Z11.59 NEED FOR HEPATITIS B SCREENING TEST: ICD-10-CM

## 2024-04-03 DIAGNOSIS — Z11.3 SCREENING EXAMINATION FOR SEXUALLY TRANSMITTED DISEASE: ICD-10-CM

## 2024-04-03 DIAGNOSIS — Z00.00 ANNUAL PHYSICAL EXAM: Primary | ICD-10-CM

## 2024-04-03 DIAGNOSIS — E66.09 CLASS 2 OBESITY DUE TO EXCESS CALORIES WITHOUT SERIOUS COMORBIDITY WITH BODY MASS INDEX (BMI) OF 37.0 TO 37.9 IN ADULT: ICD-10-CM

## 2024-04-03 RX ORDER — SILDENAFIL 100 MG/1
100 TABLET, FILM COATED ORAL DAILY PRN
Qty: 90 TABLET | Refills: 1 | Status: SHIPPED | OUTPATIENT
Start: 2024-04-03

## 2024-04-03 NOTE — PROGRESS NOTES
"  Eloy Bryant D.O.  Internal Medicine  [facility]  [address], [address]  [place] [address]  [phone]    Chief Complaint  Annual checkup  Obesity    HISTORY    Isma De Luna is a 54 y.o. male who presents to the office today as a  an established patient for their annual preventative exam.      No hospitalization(s) within the last year.     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 He is a patient at the [facility].  He has undergone multiple surgical procedures related to this condition including larynx nerve sheath tumor removal in 2005, resection of 3 neurofibromas on spinal cord in 2010, removal of stomach schwannoma in 2014, resection of 2 tumors on left brachial plexus in 2018, excision of neurofibromas from right brachial plexus and C8 nerve root in 2022. Follows with [facility], had radiation treatment at [facility] [[email or web address]] 11/2022.      Chronic pain syndrome, brachial plexus neuropathy: follows with [facility] , prescribed Horizant 600 mg twice daily and Norco  for breakthrough pain (usually a few times per month or less).      Kidney cancer s/p left nephrectomy with recurrence in the right kidney which was treated with partial nephrectomy: previously followed with [facility] but pt states he has transferred care to [facility] for this concern. reports they are following masses on the right kidney but they are believed to be cysts or neurofibromas.     HLD: not currently on medication     Depression: takes duloxetine 60 mg daily. Previously on Wellbutrin.  Has done grief counseling before. Not currently in counseling. Feels that depression is well controlled but gets sad at times due to memories \"I'm doing good\".      Allergies: allergic to pollen, ragweed, dust; alternates " between Zyrtec and allegra and uses flonase     ED: takes sildenafil  mg daily as needed     Gout: no further attacks since starting febuxostat 40 mg daily   Lab Results   Component Value Date    URICACID 5.7 06/30/2023     Prediabetes/Obesity: states his diet has been pretty good with desserts/pastas/breads. He thinks it is OK to start weight loss medications.   Lab Results   Component Value Date    HGBA1C 5.6 12/29/2023     HTN: takes amlodipine 10 mg daily, propranolol  mg daily, losartan 100 mg daily. January 2024 average BP over 3 readings 125/76; Feb 2024 average over 7 readings of 134/78; March 2024 average over 3 readings 130/78, April 2024 average over 2 readings 133/84.     Any other concerns regarding their health today: none    Health Maintenance Summary            Overdue - Hepatitis B (2 of 3 - 19+ 3-dose series) Overdue since 5/4/2023 04/06/2023  Imm Admin: Hepatitis B Adult/Adolescent IM              Overdue - LIPID PANEL (Yearly) Overdue since 10/7/2023      10/07/2022  Lipid panel    03/29/2022  Lipid Panel    11/23/2021  Lipid panel    06/25/2021  Lipid Panel    10/09/2020  Lipid Panel    Only the first 5 history entries have been loaded, but more history exists.              INFLUENZA VACCINE (Yearly - August to March) Next due on 8/1/2024      10/06/2023  Imm Admin: Fluzone Quad >6mos (Multi-dose)    10/06/2023  Imm Admin: Fluzone (or Fluarix & Flulaval for VFC) >6mos    10/14/2022  Imm Admin: Fluzone (or Fluarix & Flulaval for VFC) >6mos    10/01/2021  Imm Admin: Fluzone (or Fluarix & Flulaval for VFC) >6mos    09/03/2020  Done - [facility]    Only the first 5 history entries have been loaded, but more history exists.              BMI FOLLOWUP (Yearly) Next due on 9/27/2024 09/27/2023  Smart[employer]: WORKFLOW - QUALITY MEASUREMENT - BMI FOLLOW UP CARE PLAN DOCUMENTED    09/27/2023  SmartData: WORKFLOW - QUALITY MEASUREMENT - DOCUMENTED WEIGHT FOLLOW-UP PLAN    06/05/2019   SmartData: WORKFLOW - QUALITY MEASUREMENT - DOCUMENTED WEIGHT FOLLOW-UP PLAN    01/19/2018  SmartData: WORKFLOW - QUALITY MEASUREMENT - DOCUMENTED WEIGHT FOLLOW-UP PLAN    09/26/2017  SmartData: WORKFLOW - QUALITY MEASUREMENT - DOCUMENTED WEIGHT FOLLOW-UP PLAN    Only the first 5 history entries have been loaded, but more history exists.              ANNUAL PHYSICAL (Yearly) Next due on 4/3/2025      04/03/2024  Done    03/30/2023  Done    10/09/2020  Done              COLORECTAL CANCER SCREENING (COLONOSCOPY - Every 5 Years) Next due on 6/18/2026 06/18/2021  Surgical Procedure: COLONOSCOPY    06/18/2021  SCANNED - COLONOSCOPY              TDAP/TD VACCINES (4 - Td or Tdap) Next due on 10/4/2026      10/04/2016  Imm Admin: Tdap    06/03/2008  Imm Admin: Td (TDVAX)    01/01/1998  Imm Admin: Td (TDVAX)              Pneumococcal Vaccine 0-64 (Series Information) Aged Out      11/21/2017  Imm Admin: Pneumococcal Conjugate 13-Valent (PCV13)    09/01/2012  Imm Admin: PEDS-Pneumococcal Conjugate (PCV7)    09/01/2012  Imm Admin: Pneumococcal, Unspecified    04/02/2012  Imm Admin: Pneumococcal Polysaccharide (PPSV23)              ZOSTER VACCINE (Series Information) Completed      02/11/2022  Imm Admin: Shingrix    11/12/2021  Imm Admin: Shingrix              Orthopox/Monkeypox (Series Information) Completed      11/21/2022  Imm Admin: Monkeypox/Smallpox ID ([email or web address])    10/21/2022  Imm Admin: Monkeypox/Smallpox ID ([email or web address])              COVID-19 Vaccine (Series Information) Completed      10/06/2023  Imm Admin: COVID-19 F23 (MODERNA) 12YRS+ (SPIKEVAX)    10/14/2022  Imm Admin: COVID-19 (MODERNA) BIVALENT 12+YRS    07/15/2022  Imm Admin: COVID-19 (MODERNA) Monovalent Original Booster    10/26/2021  Imm Admin: COVID-19 (MODERNA) 1st,2nd,3rd Dose Monovalent    04/07/2021  Imm Admin: COVID-19 (MODERNA) 1st,2nd,3rd Dose Monovalent    Only the first 5 history entries have been loaded, but more history exists.               HEPATITIS C SCREENING  Completed      12/29/2023  Hep C Virus Ab component of Hepatitis C Antibody    06/19/2020  Hep C Virus Ab component of Hepatitis C antibody                     Allergies   Allergen Reactions    Molds & Smuts Other (See Comments)     Congestion, sneezing    Nsaids Other (See Comments)     HX: Kidney CA; radical left nephrectomy, partial right.    Pollen Extract Other (See Comments)     Other reaction(s): Other (See Comments)  Pollen, rag weed   Other reaction(s): Other (See Comments)  Pollen, rag weed         Outpatient Medications Marked as Taking for the 4/3/24 encounter (Office Visit) with Eloy rByant,    Medication Sig Dispense Refill    amLODIPine (NORVASC) 10 MG tablet TAKE 1 TABLET BY MOUTH DAILY 90 tablet 1    cyclobenzaprine (FLEXERIL) 10 MG tablet Take 1 tablet by mouth 3 (Three) Times a Day As Needed for Muscle Spasms. for muscle spams 90 tablet 1    DULoxetine (CYMBALTA) 60 MG capsule TK 1 C PO D      febuxostat (ULORIC) 40 MG tablet TAKE 1 TABLET BY MOUTH DAILY 90 tablet 1    fexofenadine (ALLEGRA) 180 MG tablet Take 1 tablet by mouth Daily.      fluticasone (VERAMYST) 27.5 MCG/SPRAY nasal spray 2 sprays into the nostril(s) as directed by provider Daily.      Horizant 600 MG tablet controlled-release TAKE 1 TABLET BY MOUTH 2 (TWO) TIMES A DAY. 60 tablet 5    HYDROcodone-acetaminophen (NORCO)  MG per tablet Take 1 tablet by mouth Every 6 (Six) Hours As Needed for Severe Pain. 60 tablet 0    losartan (COZAAR) 100 MG tablet TAKE 1 TABLET BY MOUTH DAILY 90 tablet 1    Multiple Vitamin (MULTI-VITAMIN DAILY PO) Take  by mouth Daily.      omeprazole (priLOSEC) 40 MG capsule TAKE 1 CAPSULE BY MOUTH DAILY 90 capsule 1    propranolol LA (INDERAL LA) 120 MG 24 hr capsule TAKE 1 CAPSULE BY MOUTH DAILY 90 capsule 0    [DISCONTINUED] sildenafil (REVATIO) 20 MG tablet Take 1 tablet by mouth As Needed.         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    PONV (postoperative nausea and vomiting)     Prediabetes     Renal cancer     Schwannomatosis     Tenosynovitis, de Quervain     Left Wrist    Vitamin B12 deficiency      Past Surgical History:   Procedure Laterality Date    CARPAL TUNNEL RELEASE Left 2018    CHOLECYSTECTOMY  2007    COLONOSCOPY N/A 2021    Procedure: COLONOSCOPY WITH POLYPECTOMY (HOT SNARE);  Surgeon: Octavio Novak Jr., MD;  Location: [facility] ENDOSCOPY;  Service: General;  Laterality: N/A;  PRE-- SCREENING  POST-- POLYPS    HYDROCELECTOMY Left 19    INGUINAL HERNIA REPAIR Right     LYMPH NODE BIOPSY      Dr George    NEPHRECTOMY Left     radical    NEPHRECTOMY PARTIAL Right     NEUROFIBROMA EXCISION Left 2018    left side of neck    NEUROFIBROMA EXCISION Right 2022    EXCISION PERIPHERAL NERVE TUMOR, COMPLEX, brachial plexus tumors, 2 vs 3, right.    SPINE SURGERY      T12-L1 neurofibromas removed     Family History   Problem Relation Age of Onset    Ovarian cancer Mother          age 62    Uterine cancer Mother     Cancer Father          age 43    Kidney cancer Brother     No Known Problems Brother     Cancer Maternal Grandmother     Heart disease Paternal Grandmother     Stroke Paternal Grandmother     Diabetes Paternal Grandmother     reports that he has never smoked. He has never used smokeless tobacco. He reports that he does not currently use alcohol. He reports that he does not use drugs.    Immunization History   Administered Date(s) Administered    COVID-19 (MODERNA) 1st,2nd,3rd Dose Monovalent 2021, 2021, 2021, 2021, 10/26/2021    COVID-19 (MODERNA) BIVALENT 12+YRS 10/14/2022    COVID-19 (MODERNA) Monovalent Original Booster 07/15/2022     "COVID-19 F23 (MODERNA) 12YRS+ (SPIKEVAX) 10/06/2023    FluMist 2-49yrs 11/21/2017    Fluzone (or Fluarix & Flulaval for VFC) >6mos 08/21/2020, 10/01/2021, 10/14/2022, 10/06/2023    Fluzone Quad >6mos (Multi-dose) 10/06/2023    Hepatitis A 06/12/2018, 02/21/2019, 05/21/2019    Hepatitis B Adult/Adolescent IM 04/06/2023    Influenza TIV (IM) 09/01/2013, 10/21/2014, 10/04/2016    Influenza, Unspecified 11/16/2018    Monkeypox/Smallpox ID ([email or web address]) 10/21/2022, 11/21/2022    PEDS-Pneumococcal Conjugate (PCV7) 09/01/2012    Pneumococcal Conjugate 13-Valent (PCV13) 11/21/2017    Pneumococcal Polysaccharide (PPSV23) 04/02/2012    Pneumococcal, Unspecified 09/01/2012    Shingrix 11/12/2021, 02/11/2022    Td (TDVAX) 01/01/1998, 06/03/2008    Tdap 10/04/2016        OBJECTIVE    Vital Signs:   /90 (BP Location: Left arm, Patient Position: Sitting, Cuff Size: Adult)   Pulse 63   Temp 98.7 °F (37.1 °C) (Oral)   Resp 14   Ht 172.7 cm (67.99\")   Wt 111 kg (245 lb 3.2 oz)   SpO2 97%   BMI 37.29 kg/m²     Physical Exam  Vitals reviewed.   Constitutional:       General: He is not in acute distress.     Appearance: Normal appearance. He is obese. He is not ill-appearing.   HENT:      Head: Normocephalic and atraumatic.      Right Ear: Tympanic membrane, ear canal and external ear normal. There is no impacted cerumen.      Left Ear: Tympanic membrane, ear canal and external ear normal. There is no impacted cerumen.      Mouth/Throat:      Mouth: Mucous membranes are moist.      Pharynx: No oropharyngeal exudate or posterior oropharyngeal erythema.      Comments: Mallampati class III  Eyes:      General: No scleral icterus.     Extraocular Movements: Extraocular movements intact.      Conjunctiva/sclera: Conjunctivae normal.      Pupils: Pupils are equal, round, and reactive to light.   Cardiovascular:      Rate and Rhythm: Normal rate and regular rhythm.      Heart sounds: Normal heart sounds. No murmur heard.  Pulmonary: "      Effort: Pulmonary effort is normal. No respiratory distress.      Breath sounds: Normal breath sounds. No wheezing.   Abdominal:      General: Bowel sounds are normal. There is no distension.      Palpations: Abdomen is soft.      Tenderness: There is no abdominal tenderness. There is no guarding.   Musculoskeletal:      Cervical back: Neck supple.      Right lower leg: No edema.      Left lower leg: No edema.   Lymphadenopathy:      Cervical: No cervical adenopathy.   Skin:     General: Skin is warm and dry.      Coloration: Skin is not jaundiced.   Neurological:      General: No focal deficit present.      Mental Status: He is alert and oriented to person, place, and time.      Cranial Nerves: No cranial nerve deficit.      Motor: No weakness.   Psychiatric:         Mood and Affect: Mood normal.         Behavior: Behavior normal.         Thought Content: Thought content normal.                   The 10-year ASCVD risk score (Montrell GOLDSTEIN, et al., 2019) is: 7.4%    Values used to calculate the score:      Age: 54 years      Sex: Male      Is Non- : No      Diabetic: No      Tobacco smoker: No      Systolic Blood Pressure: 140 mmHg      Is BP treated: Yes      HDL Cholesterol: 44 mg/dL      Total Cholesterol: 191 mg/dL           ASSESSMENT & PLAN     Annual Preventative Health Examination  -Age and sex appropriate physical exam performed and documented. Updated past medical, family, social and surgical histories as well as allergies and care team list. Addressed care gaps listed in the medical record.  -Encouraged annual dental and vision exams as part of their overall health.  -Encouraged minimum of 30 minutes or more of exercise at a brisk walk or higher 5 days per week combined with a well-balanced diet.   -Immunizations reviewed and updated in EMR.   -Lipid screening:  Patient is over age 40 and a 10-year ASCVD risk was calculated which does not indicate a need for statin therapy.    -Aspirin for primary or secondary prevention: ASCVD risk calculate and aspirin for primary prevention is not indicated.  -Depression and Anxiety screening: Patient has known diagnosis of depression and / or anxiety.  -Diabetes screening: Patient has a diagnosis of pre-diabetes and is being monitored with yearly hemoglobin A1c.   -Tobacco use screening: Conducted and addressed if indicated.   -Alcohol use screening: Conducted and addressed if indicated.   -Illicit drug screening: Conducted and addressed if indicated.   -Abdominal aortic aneurysm screening: AAA screening is not indicated as patient is less than 65 years of age.  -Hypertension screening: Patient with known diagnosis of hypertension and is receiving treatment.  -HIV screening: Patient has already received HIV screening and it was negative. Patient accepted repeat screening today.   -Hepatitis C virus screening:  Patient has already completed Hepatitis C screening. Negative screening on file.   -Syphilis screening: pt declined screening  -Hepatitis B virus screening: Patient has already completed screening.  -Colon cancer screening: Patient is already up to date on their colon cancer screening with colonoscopy and screening is indicated again in 2026  -Lung cancer screening: Patient has never smoked.  -Prostate cancer screening: up to date  Lab Results   Component Value Date    PSA 1.10 09/11/2023    PSA 0.954 03/30/2023    PSA 1.08 03/09/2021           Follow up in 1 year for annual physical exam.    Patient/family had no further questions at this time and verbalized understanding of the plan discussed today.     A problem-based visit was also conducted on the same day, see below for assessment and plan    Diagnoses and all orders for this visit:    1. Class 2 obesity due to excess calories without serious comorbidity with body mass index (BMI) of 37.0 to 37.9 in adult (Primary)  -chronic , uncontrolled.  -Patient seen today for initiation of  pharmacological weight loss therapy. Patient advised of the alternatives to pharmacological weight loss therapy, including strict dieting and exercise, weight loss programs such as Weightwatchers. Advised patient that weight loss medication is most successful when in conjunction with diet and exercise.  -After discussing the alternatives, patient is interested in starting Zepbound.  -Patient’s current weight is 245 pounds and BMI is 37.3. he has comorbidities of HTN, prediabetes and hyperlipidemia   -Reviewed contraindications for Zepbound: Patients with a personal or family history or MTC or patients with MEN 2 or pregnancy  -Discussed the most common side effects , including nausea, diarrhea, constipation, vomiting, injection site reactions, headache, indigestion, fatigue, belching, hair loss, heartburn, minor abdominal pain  -Advised patient to report severe abdominal pain   -Counseled patient regarding the potential risk of MTC with use of Zepbound  -Counseled patient on strategies to limit nausea with Zepbound:    *Eat bland, low-fat foods, like crackers, toast, and rice   *Eat foods that contain water, like soups and gelatin   *Avoid lying down after they eat  -Educated patient on proper injection location.   -Will begin Zebound titration. Patient to increase each week as long as side effects are tolerable.   Week 1 through week 4 : 2.5 mg once weekly.  Week 5 through week 8: 5 mg once weekly.  Week 9 through week 12: 7.5 mg once weekly.  Week 13 through week 16: 10 mg once weekly.  Week 17 through week 20 : 12.5 mg once weekly  Week 21 and after: 15 mg once weekly   -Will follow up in 3-4 months for weight check. Plan to discontinue if at least 5% of baseline body weight loss has not been achieved at that time.     -     Tirzepatide-Weight Management (ZEPBOUND) 2.5 MG/0.5ML solution auto-injector; Inject 0.5 mL under the skin into the appropriate area as directed 1 (One) Time Per Week.  Dispense: 2 mL;  Refill: 0            The following social determinates of health impact the patient's medical decision making: No social determinates of health were factored in to today's visit.     Follow Up  Return in about 4 months (around 8/3/2024) for Recheck.

## 2024-04-04 LAB
HBV CORE AB SERPL QL IA: NEGATIVE
HBV SURFACE AB SER QL: NON REACTIVE
HBV SURFACE AG SERPL QL IA: NEGATIVE
HIV 1+2 AB+HIV1 P24 AG SERPL QL IA: NON REACTIVE
IMP & REVIEW OF LAB RESULTS: NORMAL
LABORATORY COMMENT REPORT: NORMAL
TREPONEMA PALLIDUM IGG+IGM AB [PRESENCE] IN SERUM OR PLASMA BY IMMUNOASSAY: NON REACTIVE

## 2024-04-16 ENCOUNTER — TELEPHONE (OUTPATIENT)
Dept: INTERNAL MEDICINE | Facility: CLINIC | Age: 54
End: 2024-04-16

## 2024-04-16 DIAGNOSIS — E66.09 CLASS 2 OBESITY DUE TO EXCESS CALORIES WITHOUT SERIOUS COMORBIDITY WITH BODY MASS INDEX (BMI) OF 37.0 TO 37.9 IN ADULT: Primary | ICD-10-CM

## 2024-04-16 NOTE — TELEPHONE ENCOUNTER
Caller: Isma De Luna    Relationship: Self    Best call back number: [phone]    What is the best time to reach you: ANYTIME    Who are you requesting to speak with (clinical staff, provider,  specific staff member): CLINICAL STAFF    Do you know the name of the person who called: PATIENT     What was the call regarding: PATIENT CALLED AND REQUESTED CALLBACK FROM DR. SEBASTIAN MEDICAL ASSISTANT     Is it okay if the provider responds through MyChart: NO

## 2024-04-19 ENCOUNTER — OFFICE VISIT (OUTPATIENT)
Dept: PAIN MEDICINE | Facility: CLINIC | Age: 54
End: 2024-04-19
Payer: COMMERCIAL

## 2024-04-19 VITALS
OXYGEN SATURATION: 98 % | HEART RATE: 60 BPM | TEMPERATURE: 97.5 F | HEIGHT: 68 IN | DIASTOLIC BLOOD PRESSURE: 81 MMHG | BODY MASS INDEX: 37.47 KG/M2 | WEIGHT: 247.2 LBS | SYSTOLIC BLOOD PRESSURE: 129 MMHG

## 2024-04-19 DIAGNOSIS — G89.4 CHRONIC PAIN SYNDROME: Primary | ICD-10-CM

## 2024-04-19 DIAGNOSIS — Z79.899 ENCOUNTER FOR LONG-TERM (CURRENT) USE OF HIGH-RISK MEDICATION: ICD-10-CM

## 2024-04-19 DIAGNOSIS — G54.0 BRACHIAL PLEXUS NEUROPATHY: ICD-10-CM

## 2024-04-19 DIAGNOSIS — Q85.00 NEUROFIBROMATOSIS: ICD-10-CM

## 2024-04-19 PROCEDURE — 99214 OFFICE O/P EST MOD 30 MIN: CPT | Performed by: NURSE PRACTITIONER

## 2024-04-19 NOTE — PROGRESS NOTES
CHIEF COMPLAINT  F/U neck pain    Subjective   Isma De Luna is a 54 y.o. male  who presents for follow-up.  He has a history of chronic neck pain and arm pain related to neurofibromatosis.  Today his pain is 1/10VAS in severity. Taking hydrocodone 10/325 which utilizes extremely sparingly, medication typically lasts 4-5 months. He also continues with Horizant 600 mg BID, Cymbalta 60 mg daily. Reports moderate pain reduction with regimen and denies adverse reactions. He continues working full time.       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 He is a patient at the [facility] but also follows with local team neurology/neuro oncology/radiation oncology.  He has had multiple surgeries to remove tumors related to the disease, most recent was 4/13/2022 removal of right brachial plexus tumors.  Surveillance MRI's of the spine and brachial plexus every 3-6 months.  He has also had successful radiation treatment to one of the schwannomas.     Neck Pain   This is a chronic problem. The current episode started more than 1 month ago. The problem occurs constantly. The problem has been waxing and waning. The pain is present in the midline and right side. The quality of the pain is described as aching, cramping and shooting. The pain is at a severity of 1/10. The pain is mild. The symptoms are aggravated by twisting. Pertinent negatives include no chest pain, fever, headaches, numbness or weakness. He has tried acetaminophen, bed rest, heat, ice, NSAIDs, oral narcotics, muscle relaxants, neck support and home exercises (hydrocodone) for the symptoms. The treatment provided moderate relief.   Arm Pain   The pain is present in the left hand, right hand, upper left arm and upper right arm. The quality of the pain is described as aching (tingling). The pain radiates to the right arm and right hand. The pain is at a severity of 1/10. The pain is mild. The pain has been  "Fluctuating since the incident. Pertinent negatives include no chest pain or numbness.        PEG Assessment   What number best describes your pain on average in the past week?2  What number best describes how, during the past week, pain has interfered with your enjoyment of life?0  What number best describes how, during the past week, pain has interfered with your general activity?  0    Review of Pertinent Medical Data ---    The following portions of the patient's history were reviewed and updated as appropriate: allergies, current medications, past family history, past medical history, past social history, past surgical history, and problem list.    Review of Systems   Constitutional:  Negative for activity change, chills, fatigue and fever.   HENT:  Negative for congestion.    Eyes:  Negative for visual disturbance.   Respiratory:  Negative for chest tightness and shortness of breath.    Cardiovascular:  Negative for chest pain.   Gastrointestinal:  Negative for abdominal pain, constipation and diarrhea.   Genitourinary:  Negative for difficulty urinating and dysuria.   Musculoskeletal:  Positive for neck pain and neck stiffness. Negative for back pain.   Neurological:  Negative for dizziness, weakness, light-headedness, numbness and headaches.   Psychiatric/Behavioral:  Negative for agitation and sleep disturbance. The patient is not nervous/anxious.        I have reviewed and confirmed the accuracy of the ROS as documented by the MA/LPN/RN EMILIE Shrestha    Vitals:    04/19/24 1454   BP: 129/81   Pulse: 60   Temp: 97.5 °F (36.4 °C)   SpO2: 98%   Weight: 112 kg (247 lb 3.2 oz)   Height: 172.7 cm (68\")   PainSc:   1   PainLoc: Shoulder  Comment: left         Objective   Physical Exam  Vitals and nursing note reviewed.   Constitutional:       General: He is not in acute distress.     Appearance: Normal appearance. He is not ill-appearing.   Pulmonary:      Effort: Pulmonary effort is normal. No " respiratory distress.   Musculoskeletal:      Right shoulder: Tenderness present.      Right upper arm: Tenderness present.      Right hand: Tenderness present.      Cervical back: Tenderness and bony tenderness present.   Neurological:      Mental Status: He is alert and oriented to person, place, and time.      Motor: No weakness.      Gait: Gait is intact. Gait normal.   Psychiatric:         Mood and Affect: Mood normal.         Behavior: Behavior normal.         Assessment & Plan   Diagnoses and all orders for this visit:    1. Chronic pain syndrome (Primary)    2. Brachial plexus neuropathy    3. Neurofibromatosis    4. Encounter for long-term (current) use of high-risk medication      --- Continue Horizant, Hydrocodone. Patient appears stable with current regimen. No adverse effects. Regarding continuation of opioids, there is no evidence of aberrant behavior or any red flags.  The patient continues with appropriate response to opioid therapy. ADL's remain intact by self.   --- The urine drug screen confirmation from 7/20/2024 has been reviewed and the result is appropriate based on patient history and BIRGIT report  --- The patient signed an updated copy of the controlled substance agreement on 7/20/2023  --- Low risk for opioid abuse/misuse     Isma De Luna reports a pain score of 1.  Given his pain assessment as noted, treatment options were discussed and the following options were decided upon as a follow-up plan to address the patient's pain: continuation of current treatment plan for pain.      --- Follow-up 3 months                  BIRGIT REPORT  As part of the patient's treatment plan, I am prescribing controlled substances. The patient has been made aware of appropriate use of such medications, including potential risk of somnolence, limited ability to drive and/or work safely, and the potential for dependence or overdose. It has also been made clear that these medications are for use by this patient  only, without concomitant use of alcohol or other substances unless prescribed.     Patient has completed prescribing agreement detailing terms of continued prescribing of controlled substances, including monitoring BIRGIT reports, urine drug screening, and pill counts if necessary. The patient is aware that inappropriate use will results in cessation of prescribing such medications.    As the clinician, I personally reviewed the BIRGIT from 4/19/2024 while the patient was in the office today.    History and physical exam exhibit continued safe and appropriate use of controlled substances.       Dictated utilizing Dragon dictation.

## 2024-06-16 DIAGNOSIS — I10 ESSENTIAL HYPERTENSION: ICD-10-CM

## 2024-06-17 ENCOUNTER — TELEPHONE (OUTPATIENT)
Dept: INTERNAL MEDICINE | Facility: CLINIC | Age: 54
End: 2024-06-17
Payer: COMMERCIAL

## 2024-06-17 DIAGNOSIS — F39 MOOD DISORDER: ICD-10-CM

## 2024-06-17 RX ORDER — AMLODIPINE BESYLATE 10 MG/1
10 TABLET ORAL DAILY
Qty: 90 TABLET | Refills: 1 | Status: SHIPPED | OUTPATIENT
Start: 2024-06-17

## 2024-06-17 RX ORDER — DULOXETIN HYDROCHLORIDE 60 MG/1
60 CAPSULE, DELAYED RELEASE ORAL DAILY
Qty: 90 CAPSULE | Refills: 2 | Status: SHIPPED | OUTPATIENT
Start: 2024-06-17

## 2024-06-17 RX ORDER — PROPRANOLOL HYDROCHLORIDE 120 MG/1
120 CAPSULE, EXTENDED RELEASE ORAL DAILY
Qty: 90 CAPSULE | Refills: 0 | Status: SHIPPED | OUTPATIENT
Start: 2024-06-17

## 2024-06-17 NOTE — TELEPHONE ENCOUNTER
Caller: Isma De Luna    Relationship: Self    Best call back number: [phone]    What medication are you requesting: DULoxetine (CYMBALTA) 60 MG capsule     Have you had these symptoms before:    [] Yes  [x] No    Have you been treated for these symptoms before:   [] Yes  [x] No    If a prescription is needed, what is your preferred pharmacy and phone number: [facility] #[id number] - [place], [place] - [address] AT [address] [facility] [phone]  - [phone] FX     Additional notes: STATED THAT THEY ARE NOT SURE WHO THE MEDICATION WAS GIVEN TO HIM THROUGH BUT THEY WANT TO GET THIS DONE THROUGH DR. SEBASTIAN IF POSSIBLE. STATED THAT THEY WOULD LIKE IT TO BE FOR A 90 DAY SUPPLY IF IT IS ABLE TO BE CALLED IN. STATED THAT THEY HAVE ABOUT A WEEK REMAINING ON THE CURRENT BOTTLE THEY HAVE. PLEASE CALL AND ADVISE

## 2024-07-01 RX ORDER — SEMAGLUTIDE 0.5 MG/.5ML
0.5 INJECTION, SOLUTION SUBCUTANEOUS WEEKLY
Qty: 2 ML | Refills: 0 | Status: SHIPPED | OUTPATIENT
Start: 2024-07-01

## 2024-07-02 RX ORDER — GABAPENTIN ENACARBIL 600 MG/1
TABLET, EXTENDED RELEASE ORAL
Qty: 60 TABLET | Refills: 5 | OUTPATIENT
Start: 2024-07-02

## 2024-07-11 RX ORDER — GABAPENTIN ENACARBIL 600 MG/1
TABLET, EXTENDED RELEASE ORAL
Qty: 60 TABLET | Refills: 5 | Status: SHIPPED | OUTPATIENT
Start: 2024-07-11

## 2024-07-11 NOTE — TELEPHONE ENCOUNTER
Caller: [facility] - [address] - [phone] [facility] [phone] FX    Best call back number:     Requested Prescriptions:   Requested Prescriptions     Pending Prescriptions Disp Refills    Horizant 600 MG tablet controlled-release [Pharmacy Med Name: Horizant  mg tablet,extended release] 60 tablet 5     Sig: TAKE 1 TABLET BY MOUTH 2 (TWO) TIMES A DAY.        Pharmacy where request should be sent: [address] - [phone] [facility] [phone] FX     Last office visit with prescribing clinician: 4/19/2024   Last telemedicine visit with prescribing clinician: Visit date not found   Next office visit with prescribing clinician: 7/19/2024     Additional details provided by patient: NEED AUTH APPROVED    Does the patient have less than a 3 day supply:  [x] Yes  [] No    Would you like a call back once the refill request has been completed: [] Yes [] No    If the office needs to give you a call back, can they leave a voicemail: [] Yes [] No    Christi Preston Rep   07/11/24 10:28 EDT

## 2024-08-01 ENCOUNTER — TELEPHONE (OUTPATIENT)
Dept: INTERNAL MEDICINE | Facility: CLINIC | Age: 54
End: 2024-08-01
Payer: COMMERCIAL

## 2024-08-01 RX ORDER — SEMAGLUTIDE 1 MG/.5ML
1 INJECTION, SOLUTION SUBCUTANEOUS WEEKLY
Qty: 2 ML | Refills: 0 | Status: SHIPPED | OUTPATIENT
Start: 2024-08-01

## 2024-08-01 NOTE — TELEPHONE ENCOUNTER
Caller: Isma De Luna    Relationship: Self    Best call back number: [phone]    What is the best time to reach you: ANYTIME     Who are you requesting to speak with (clinical staff, provider,  specific staff member): CLINICAL     What was the call regarding: PATIENT WOULD LIKE TO MOVE UP TO THE 1MG DOSAGE ON HIS WEGOVY AND HE WOULD LIKE THIS MEDICATION SENT TO [facility] #[id number] [place], [place] - [address] AT [facility] [address] - [phone] [facility] [phone] FX     PATIENT HAS 2 INJECTIONS LEFT OF THE .5MG DOSAGE.

## 2024-08-02 ENCOUNTER — OFFICE VISIT (OUTPATIENT)
Dept: PAIN MEDICINE | Facility: CLINIC | Age: 54
End: 2024-08-02
Payer: COMMERCIAL

## 2024-08-02 VITALS
DIASTOLIC BLOOD PRESSURE: 79 MMHG | WEIGHT: 245.8 LBS | RESPIRATION RATE: 18 BRPM | SYSTOLIC BLOOD PRESSURE: 125 MMHG | TEMPERATURE: 96.8 F | OXYGEN SATURATION: 98 % | BODY MASS INDEX: 37.25 KG/M2 | HEIGHT: 68 IN | HEART RATE: 65 BPM

## 2024-08-02 DIAGNOSIS — Q85.00 NEUROFIBROMATOSIS: ICD-10-CM

## 2024-08-02 DIAGNOSIS — M79.2 NEUROPATHIC PAIN, ARM: ICD-10-CM

## 2024-08-02 DIAGNOSIS — G89.4 CHRONIC PAIN SYNDROME: Primary | ICD-10-CM

## 2024-08-02 DIAGNOSIS — G54.0 BRACHIAL PLEXUS NEUROPATHY: ICD-10-CM

## 2024-08-02 DIAGNOSIS — Z79.899 ENCOUNTER FOR LONG-TERM (CURRENT) USE OF HIGH-RISK MEDICATION: ICD-10-CM

## 2024-08-02 RX ORDER — HYDROCODONE BITARTRATE AND ACETAMINOPHEN 10; 325 MG/1; MG/1
1 TABLET ORAL EVERY 6 HOURS PRN
Qty: 60 TABLET | Refills: 0 | Status: SHIPPED | OUTPATIENT
Start: 2024-08-02

## 2024-08-02 NOTE — PROGRESS NOTES
CHIEF COMPLAINT  Neck pain  Pain fluctuates.  Uds:neg    Subjective   Isma De Luna is a 54 y.o. male  who presents for follow-up.  He has a history of neck pain, UE pain related to neurofibromatosis.  Today his pain is 1/10VAS in severity. Taking hydrocodone 10/325 which utilizes extremely sparingly, medication typically lasts 4-5 months. He also continues with Horizant 600 mg BID, Cymbalta 60 mg daily. Reports moderate pain reduction with regimen and denies adverse reactions. He continues working full time.      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 He is a patient at the [facility] but also follows with local team neurology/neuro oncology/radiation oncology.  He has had multiple surgeries to remove tumors related to the disease, most recent was 4/13/2022 removal of right brachial plexus tumors.  Surveillance MRI's of the spine and brachial plexus every 3-6 months.  He has also had successful radiation treatment to one of the schwannomas.    Renal cell carcinoma has returned. Has consulted with oncology at [facility], planning on cryoablation and possibly radiation therapy.      Neck Pain   This is a chronic problem. The current episode started more than 1 month ago. The problem occurs constantly. The problem has been waxing and waning. The pain is present in the midline and right side. The quality of the pain is described as aching, cramping and shooting. The pain is at a severity of 1/10. The pain is mild. The symptoms are aggravated by twisting. Associated symptoms include numbness. Pertinent negatives include no chest pain, fever, headaches or weakness. He has tried acetaminophen, bed rest, heat, ice, NSAIDs, oral narcotics, muscle relaxants, neck support and home exercises (hydrocodone) for the symptoms. The treatment provided moderate relief.   Arm Pain   The pain is present in the left hand, right hand, upper left arm and upper right arm. The  "quality of the pain is described as aching (tingling). The pain radiates to the right arm and right hand. The pain is at a severity of 1/10. The pain is mild. The pain has been Fluctuating since the incident. Associated symptoms include numbness. Pertinent negatives include no chest pain.     PEG Assessment   What number best describes your pain on average in the past week?1  What number best describes how, during the past week, pain has interfered with your enjoyment of life?0  What number best describes how, during the past week, pain has interfered with your general activity?  2    Review of Pertinent Medical Data ---     The following portions of the patient's history were reviewed and updated as appropriate: allergies, current medications, past family history, past medical history, past social history, past surgical history, and problem list.    Review of Systems   Constitutional:  Negative for activity change, fatigue and fever.   Respiratory:  Negative for cough, chest tightness and shortness of breath.    Cardiovascular:  Negative for chest pain.   Gastrointestinal:  Positive for constipation. Negative for abdominal pain and diarrhea.   Genitourinary:  Negative for difficulty urinating and dysuria.   Musculoskeletal:  Positive for neck pain.   Neurological:  Positive for numbness. Negative for dizziness, weakness, light-headedness and headaches.   Psychiatric/Behavioral:  Negative for agitation, sleep disturbance and suicidal ideas. The patient is not nervous/anxious.      I have reviewed and confirmed the accuracy of the ROS as documented by the MA/EMRE/RN EMILIE Shrestha    Vitals:    08/02/24 1340   BP: 125/79   BP Location: Right arm   Patient Position: Sitting   Cuff Size: Large Adult   Pulse: 65   Resp: 18   Temp: 96.8 °F (36 °C)   TempSrc: Temporal   SpO2: 98%   Weight: 111 kg (245 lb 12.8 oz)   Height: 172.7 cm (68\")   PainSc:   1     Objective   Physical Exam  Vitals and nursing note " reviewed.   Constitutional:       General: He is not in acute distress.     Appearance: Normal appearance. He is not ill-appearing.   Pulmonary:      Effort: Pulmonary effort is normal. No respiratory distress.   Musculoskeletal:      Right shoulder: Tenderness present.      Right upper arm: Tenderness present.      Right hand: Tenderness present.      Cervical back: Tenderness and bony tenderness present.   Neurological:      Mental Status: He is alert and oriented to person, place, and time.      Motor: No weakness.      Gait: Gait is intact. Gait normal.   Psychiatric:         Mood and Affect: Mood normal.         Behavior: Behavior normal.       Assessment & Plan   Diagnoses and all orders for this visit:    1. Chronic pain syndrome (Primary)  -     HYDROcodone-acetaminophen (NORCO)  MG per tablet; Take 1 tablet by mouth Every 6 (Six) Hours As Needed for Severe Pain.  Dispense: 60 tablet; Refill: 0  -     Urine Drug Screen Confirmation - Urine, Clean Catch; Future  -     POC Urine Drug Screen, Triage    2. Brachial plexus neuropathy  -     HYDROcodone-acetaminophen (NORCO)  MG per tablet; Take 1 tablet by mouth Every 6 (Six) Hours As Needed for Severe Pain.  Dispense: 60 tablet; Refill: 0  -     Urine Drug Screen Confirmation - Urine, Clean Catch; Future  -     POC Urine Drug Screen, Triage    3. Neurofibromatosis  -     Urine Drug Screen Confirmation - Urine, Clean Catch; Future  -     POC Urine Drug Screen, Triage    4. Neuropathic pain, arm  -     Urine Drug Screen Confirmation - Urine, Clean Catch; Future  -     POC Urine Drug Screen, Triage    5. Encounter for long-term (current) use of high-risk medication  -     Urine Drug Screen Confirmation - Urine, Clean Catch; Future  -     POC Urine Drug Screen, Triage      --- Refill Hydrocodone, Horizant. Patient appears stable with current regimen. No adverse effects. Regarding continuation of opioids, there is no evidence of aberrant behavior or any  red flags.  The patient continues with appropriate response to opioid therapy. ADL's remain intact by self.   --- Routine UDS in office today as part of monitoring requirements for controlled substances.  The specimen was viewed and the immunoassay result reviewed and is NEG.  This specimen will be sent to [facility] laboratory for confirmation.     --- The patient signed an updated copy of the controlled substance agreement on 8/2/2024  --- Opioid risk very low     Isma De Luna reports a pain score of 1.  Given his pain assessment as noted, treatment options were discussed and the following options were decided upon as a follow-up plan to address the patient's pain: continuation of current treatment plan for pain.    --- Follow-up 3 months          BIRGIT REPORT  As part of the patient's treatment plan, I am prescribing controlled substances. The patient has been made aware of appropriate use of such medications, including potential risk of somnolence, limited ability to drive and/or work safely, and the potential for dependence or overdose. It has also been made clear that these medications are for use by this patient only, without concomitant use of alcohol or other substances unless prescribed.     Patient has completed prescribing agreement detailing terms of continued prescribing of controlled substances, including monitoring BIRGIT reports, urine drug screening, and pill counts if necessary. The patient is aware that inappropriate use will results in cessation of prescribing such medications.    As the clinician, I personally reviewed the BIRGIT from 8/2/2024 while the patient was in the office today.    History and physical exam exhibit continued safe and appropriate use of controlled substances.    Dictated utilizing Dragon dictation.

## 2024-08-04 DIAGNOSIS — I10 HYPERTENSION, BENIGN: ICD-10-CM

## 2024-08-04 DIAGNOSIS — K21.9 GASTROESOPHAGEAL REFLUX DISEASE WITHOUT ESOPHAGITIS: ICD-10-CM

## 2024-08-05 RX ORDER — OMEPRAZOLE 40 MG/1
40 CAPSULE, DELAYED RELEASE ORAL DAILY
Qty: 30 CAPSULE | Refills: 0 | Status: SHIPPED | OUTPATIENT
Start: 2024-08-05

## 2024-08-05 RX ORDER — LOSARTAN POTASSIUM 100 MG/1
100 TABLET ORAL DAILY
Qty: 30 TABLET | Refills: 0 | Status: SHIPPED | OUTPATIENT
Start: 2024-08-05

## 2024-08-16 ENCOUNTER — OFFICE VISIT (OUTPATIENT)
Dept: INTERNAL MEDICINE | Facility: CLINIC | Age: 54
End: 2024-08-16
Payer: COMMERCIAL

## 2024-08-16 VITALS
TEMPERATURE: 98.1 F | DIASTOLIC BLOOD PRESSURE: 70 MMHG | BODY MASS INDEX: 36.98 KG/M2 | WEIGHT: 244 LBS | SYSTOLIC BLOOD PRESSURE: 102 MMHG | OXYGEN SATURATION: 97 % | HEART RATE: 73 BPM | HEIGHT: 68 IN

## 2024-08-16 DIAGNOSIS — J06.9 VIRAL URI: Primary | ICD-10-CM

## 2024-08-16 DIAGNOSIS — I10 ESSENTIAL HYPERTENSION: ICD-10-CM

## 2024-08-16 DIAGNOSIS — E66.09 CLASS 2 OBESITY DUE TO EXCESS CALORIES WITHOUT SERIOUS COMORBIDITY WITH BODY MASS INDEX (BMI) OF 37.0 TO 37.9 IN ADULT: ICD-10-CM

## 2024-08-16 LAB
EXPIRATION DATE: NORMAL
FLUAV AG UPPER RESP QL IA.RAPID: NOT DETECTED
FLUBV AG UPPER RESP QL IA.RAPID: NOT DETECTED
INTERNAL CONTROL: NORMAL
Lab: NORMAL
SARS-COV-2 AG UPPER RESP QL IA.RAPID: NOT DETECTED

## 2024-08-16 PROCEDURE — 99214 OFFICE O/P EST MOD 30 MIN: CPT | Performed by: STUDENT IN AN ORGANIZED HEALTH CARE EDUCATION/TRAINING PROGRAM

## 2024-08-16 PROCEDURE — 87428 SARSCOV & INF VIR A&B AG IA: CPT | Performed by: STUDENT IN AN ORGANIZED HEALTH CARE EDUCATION/TRAINING PROGRAM

## 2024-08-16 NOTE — PROGRESS NOTES
Eloy Bryant D.O.  Internal Medicine  [facility]  [address], [address]  [place] [address]  [phone]      Chief Complaint  head cogestion  and Fatigue    SUBJECTIVE    History of Present Illness    Isma De Luna is a 54 y.o. male who presents to the office today as an established patient that last saw me on 4/3/2024.     Pt states he is sick currently. Symptoms started 3 days ago. The next day he felt worse. Symptoms started with sore throat, fatigue, now with nasal congestion and nasal headache. He has clear sinus drainage. He has felt some chills and sweats. He took a home COVID 19 test that was negative and someone at his workplace has similar symptoms. No fever, no cough, no ear ache. He has not taken any over the counter medications. No trouble breathing.     Obesity: currently on Wegovy 0.5 mg weekly. States he had some nausea and dizziness but that subsided. He notices more heartburn at night as he has increased the dosage and 7-8 times he has had to take a Pepcid at night. He is not noticing any significant weight loss at this time. He has not exercising.    HTN: takes amlodipine 10 mg daily, propranolol  mg daily, losartan 100 mg daily. States his BP is falling at home. States it is not unusual for it to be 100s-110/70s.   Denies symptoms of lightheadedness. No symptoms of orthostasis.     Allergies   Allergen Reactions    Molds & Smuts Other (See Comments)     Congestion, sneezing    Nsaids Other (See Comments)     HX: Kidney CA; radical left nephrectomy, partial right.    Pollen Extract Other (See Comments)     Other reaction(s): Other (See Comments)  Pollen, rag weed   Other reaction(s): Other (See Comments)  Pollen, rag weed         Outpatient Medications Marked as Taking for the 8/16/24 encounter (Office Visit) with Eloy Bryant, DO   Medication Sig Dispense Refill    amLODIPine (NORVASC) 10 MG tablet TAKE 1 TABLET BY MOUTH DAILY 90 tablet 1    cyclobenzaprine  "(FLEXERIL) 10 MG tablet Take 1 tablet by mouth 3 (Three) Times a Day As Needed for Muscle Spasms. for muscle spams 90 tablet 1    DULoxetine (CYMBALTA) 60 MG capsule Take 1 capsule by mouth Daily. 90 capsule 2    febuxostat (ULORIC) 40 MG tablet TAKE 1 TABLET BY MOUTH DAILY 90 tablet 1    fexofenadine (ALLEGRA) 180 MG tablet Take 1 tablet by mouth Daily.      fluticasone (VERAMYST) 27.5 MCG/SPRAY nasal spray 2 sprays into the nostril(s) as directed by provider Daily.      Horizant 600 MG tablet controlled-release TAKE 1 TABLET BY MOUTH 2 (TWO) TIMES A DAY. 60 tablet 5    HYDROcodone-acetaminophen (NORCO)  MG per tablet Take 1 tablet by mouth Every 6 (Six) Hours As Needed for Severe Pain. 60 tablet 0    losartan (COZAAR) 100 MG tablet TAKE 1 TABLET BY MOUTH DAILY 30 tablet 0    omeprazole (priLOSEC) 40 MG capsule TAKE 1 CAPSULE BY MOUTH DAILY 30 capsule 0    propranolol LA (INDERAL LA) 120 MG 24 hr capsule TAKE 1 CAPSULE BY MOUTH DAILY 90 capsule 0    Semaglutide-Weight Management (Wegovy) 1 MG/0.5ML solution auto-injector Inject 0.5 mL under the skin into the appropriate area as directed 1 (One) Time Per Week. 2 mL 0    sildenafil (Viagra) 100 MG tablet Take 1 tablet by mouth Daily As Needed for Erectile Dysfunction. 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 "   Constitutional:       General: He is not in acute distress.     Appearance: Normal appearance. He is obese. He is not ill-appearing.   HENT:      Right Ear: Tympanic membrane, ear canal and external ear normal. There is no impacted cerumen.      Left Ear: Tympanic membrane, ear canal and external ear normal. There is no impacted cerumen.      Mouth/Throat:      Mouth: Mucous membranes are moist.      Pharynx: Oropharynx is clear. Posterior oropharyngeal erythema (posterior pharynx) present. No oropharyngeal exudate.   Eyes:      General: No scleral icterus.  Cardiovascular:      Rate and Rhythm: Normal rate and regular rhythm.      Heart sounds: Normal heart sounds. No murmur heard.  Pulmonary:      Effort: Pulmonary effort is normal. No respiratory distress.      Breath sounds: Normal breath sounds. No wheezing.   Lymphadenopathy:      Cervical: No cervical adenopathy.   Neurological:      Mental Status: He is alert.   Psychiatric:         Mood and Affect: Mood normal.         Behavior: Behavior normal.         Thought Content: Thought content normal.                             ASSESSMENT & PLAN     Diagnoses and all orders for this visit:    1. Viral URI (Primary)  -Pt states he is sick currently. Symptoms started 3 days ago. The next day he felt worse. Symptoms started with sore throat, fatigue, now with nasal congestion and nasal headache. He has clear sinus drainage. He has felt some chills and sweats. He took a home COVID 19 test that was negative and someone at his workplace has similar symptoms. No fever, no cough, no ear ache. He has not taken any over the counter medications. No trouble breathing.   -POC COVID 19 and Flu A/B negative today  -recommend OTC meds including Tylenol, cough cold medication including Coricidin HBP, plenty of fluids and rest.  To the emergency department immediately for any shortness of air.  Return to the office if no improvement after 7 to 12 days for consideration of  antibiotic therapy.  Report  any other new symptoms.  -     POCT SARS-CoV-2 Antigen HEATHER    2. Class 2 obesity due to excess calories without serious comorbidity with body mass index (BMI) of 37.0 to 37.9 in adult  -currently on Wegovy 0.5 mg weekly. States he had some nausea and dizziness but that subsided. He notices more heartburn at night as he has increased the dosage and 7-8 times he has had to take a Pepcid at night. He is not noticing any significant weight loss at this time. He has not exercising.  -weight 244 lbs today, similar to 247 lbs 4/2024.   -he needs to monitor GI symptoms carefully as we titrate up and let me know if they become intolerable. Overall I am OK with him using a Pepcid with his PPI if needed.    3. Essential hypertension  - takes amlodipine 10 mg daily, propranolol  mg daily, losartan 100 mg daily. States his BP is falling at home. States it is not unusual for it to be 100s-110/70s.   Denies symptoms of lightheadedness. No symptoms of orthostasis.   -/70 in office today, similar to his home numbers  -advised pt that if he has any further reduction of his BP or develops symptoms of hypotension he needs to report this immediately so we can reduce his regimen          Follow Up  Return in about 4 months (around 12/16/2024) for Annual physical.    Patient/family had no further questions at this time and verbalized understanding of the plan discussed today.

## 2024-08-25 DIAGNOSIS — Z87.39 HISTORY OF GOUT: ICD-10-CM

## 2024-08-27 RX ORDER — FEBUXOSTAT 40 MG/1
40 TABLET, FILM COATED ORAL DAILY
Qty: 90 TABLET | Refills: 2 | Status: SHIPPED | OUTPATIENT
Start: 2024-08-27

## 2024-09-05 DIAGNOSIS — E66.09 CLASS 2 OBESITY DUE TO EXCESS CALORIES WITHOUT SERIOUS COMORBIDITY WITH BODY MASS INDEX (BMI) OF 37.0 TO 37.9 IN ADULT: Primary | ICD-10-CM

## 2024-09-05 RX ORDER — SEMAGLUTIDE 1.7 MG/.75ML
1.7 INJECTION, SOLUTION SUBCUTANEOUS WEEKLY
Qty: 3 ML | Refills: 0 | Status: SHIPPED | OUTPATIENT
Start: 2024-09-05

## 2024-09-06 ENCOUNTER — OFFICE VISIT (OUTPATIENT)
Dept: INTERNAL MEDICINE | Facility: CLINIC | Age: 54
End: 2024-09-06
Payer: COMMERCIAL

## 2024-09-06 VITALS
HEART RATE: 75 BPM | SYSTOLIC BLOOD PRESSURE: 110 MMHG | TEMPERATURE: 98.4 F | HEIGHT: 68 IN | WEIGHT: 243 LBS | BODY MASS INDEX: 36.83 KG/M2 | DIASTOLIC BLOOD PRESSURE: 72 MMHG | OXYGEN SATURATION: 94 %

## 2024-09-06 DIAGNOSIS — R53.83 FATIGUE, UNSPECIFIED TYPE: Primary | ICD-10-CM

## 2024-09-06 DIAGNOSIS — Z11.4 SCREENING FOR HIV (HUMAN IMMUNODEFICIENCY VIRUS): ICD-10-CM

## 2024-09-06 PROCEDURE — 99214 OFFICE O/P EST MOD 30 MIN: CPT | Performed by: STUDENT IN AN ORGANIZED HEALTH CARE EDUCATION/TRAINING PROGRAM

## 2024-09-06 NOTE — PROGRESS NOTES
Eloy Bryant D.O.  Internal Medicine  [facility]  [address], [address]  [place] [address]  [phone]      Chief Complaint  Fatigue and Headache    SUBJECTIVE    History of Present Illness    Isma De Luna is a 54 y.o. male who presents to the office today as an established patient that last saw me on 8/16/2024.   Here for acute care visit.    Pt states over the last month he had two respiratory infections. He saw me in office for one of those and was diagnosed with viral URI. COVID was negative. Last week he had another upper respiratory infection , took a home COVID test which was negative. The symptoms of the last respiratory infection are gone aside from fatigue and some chest wall pain with coughing/sneezing. He had a 99.2 temperature and felt hot during the illness but that has resolved. Now has a residual headache on the front left of the head. No nasal discharge. No cough or sputum production.  He has felt a lot of fatigue and is sleeping a lot. He overall feels low energy, foggy minded. Overall the fatigue symptoms of concern have been around for 1.5 weeks. States on July 7 2024 he had a sexual encounter with a same sex partner which was limited to oral sex. No new skin rashes.     Allergies   Allergen Reactions    Molds & Smuts Other (See Comments)     Congestion, sneezing    Nsaids Other (See Comments)     HX: Kidney CA; radical left nephrectomy, partial right.    Pollen Extract Other (See Comments)     Other reaction(s): Other (See Comments)  Pollen, rag weed   Other reaction(s): Other (See Comments)  Pollen, rag weed         Outpatient Medications Marked as Taking for the 9/6/24 encounter (Office Visit) with Eloy Bryant, DO   Medication Sig Dispense Refill    amLODIPine (NORVASC) 10 MG tablet TAKE 1 TABLET BY MOUTH DAILY 90 tablet 1    cyclobenzaprine (FLEXERIL) 10 MG tablet Take 1 tablet by mouth 3 (Three) Times a Day As Needed for Muscle Spasms. for muscle spams 90  "tablet 1    DULoxetine (CYMBALTA) 60 MG capsule Take 1 capsule by mouth Daily. 90 capsule 2    febuxostat (ULORIC) 40 MG tablet TAKE 1 TABLET BY MOUTH DAILY 90 tablet 2    fexofenadine (ALLEGRA) 180 MG tablet Take 1 tablet by mouth Daily.      fluticasone (VERAMYST) 27.5 MCG/SPRAY nasal spray 2 sprays into the nostril(s) as directed by provider Daily.      Horizant 600 MG tablet controlled-release TAKE 1 TABLET BY MOUTH 2 (TWO) TIMES A DAY. 60 tablet 5    HYDROcodone-acetaminophen (NORCO)  MG per tablet Take 1 tablet by mouth Every 6 (Six) Hours As Needed for Severe Pain. 60 tablet 0    losartan (COZAAR) 100 MG tablet TAKE 1 TABLET BY MOUTH DAILY 30 tablet 0    Multiple Vitamin (MULTI-VITAMIN DAILY PO) Take  by mouth Daily.      omeprazole (priLOSEC) 40 MG capsule TAKE 1 CAPSULE BY MOUTH DAILY 30 capsule 0    propranolol LA (INDERAL LA) 120 MG 24 hr capsule TAKE 1 CAPSULE BY MOUTH DAILY 90 capsule 0    Semaglutide-Weight Management (Wegovy) 1.7 MG/0.75ML solution auto-injector Inject 0.75 mL under the skin into the appropriate area as directed 1 (One) Time Per Week. 3 mL 0    sildenafil (Viagra) 100 MG tablet Take 1 tablet by mouth Daily As Needed for Erectile Dysfunction. 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   Constitutional:       General: He is not in acute " distress.     Appearance: He is obese. He is not ill-appearing.   HENT:      Right Ear: Tympanic membrane, ear canal and external ear normal. There is no impacted cerumen.      Left Ear: Tympanic membrane, ear canal and external ear normal. There is no impacted cerumen.      Mouth/Throat:      Mouth: Mucous membranes are moist.      Pharynx: Oropharynx is clear. No oropharyngeal exudate or posterior oropharyngeal erythema.   Eyes:      General: No scleral icterus.  Cardiovascular:      Rate and Rhythm: Normal rate and regular rhythm.      Heart sounds: Normal heart sounds. No murmur heard.  Pulmonary:      Effort: Pulmonary effort is normal. No respiratory distress.      Breath sounds: Normal breath sounds. No wheezing.   Neurological:      Mental Status: He is alert.   Psychiatric:         Mood and Affect: Mood normal.         Behavior: Behavior normal.         Thought Content: Thought content normal.                             ASSESSMENT & PLAN     Diagnoses and all orders for this visit:    1. Fatigue, unspecified type (Primary)  2. Screening for HIV (human immunodeficiency virus)  -Pt states over the last month he had two respiratory infections. He saw me in office for one of those and was diagnosed with viral URI. COVID was negative. Last week he had another upper respiratory infection , took a home COVID test which was negative. The symptoms of the last respiratory infection are gone aside from fatigue and some chest wall pain with coughing/sneezing. He had a 99.2 temperature and felt hot during the illness but that has resolved. Now has a residual headache on the front left of the head. No nasal discharge. No cough or sputum production.  He has felt a lot of fatigue and is sleeping a lot. He overall feels low energy, foggy minded. Overall the fatigue symptoms of concern have been around for 1.5 weeks. States on July 7 2024 he had a sexual encounter with a same sex partner which was limited to oral sex. No  new skin rashes.   -exam unrevealing. O2 sat is slightly lower than his usual at 94% on room air but lung sounds are clear.   -I suspect he is still recovering from multiple viral illness in a row. Will check basic labs as below. Will also screen with HIV RNA given recent sexual encounter to rule out acute HIV infection which is much less likely. I suspect he will recover naturally over a 3-4 week period and he can alert me with any new symptoms or if he fails to recovery naturally in the expected timeframe.   -     Basic metabolic panel  -     CBC w AUTO Differential  -     TSH Rfx On Abnormal To Free T4  -     HIV RNA Real Time PCR (Non-Graph)            Follow Up  No follow-ups on file.    Patient/family had no further questions at this time and verbalized understanding of the plan discussed today.

## 2024-09-08 DIAGNOSIS — I10 HYPERTENSION, BENIGN: ICD-10-CM

## 2024-09-08 DIAGNOSIS — K21.9 GASTROESOPHAGEAL REFLUX DISEASE WITHOUT ESOPHAGITIS: ICD-10-CM

## 2024-09-08 LAB
BASOPHILS # BLD AUTO: 0.1 X10E3/UL (ref 0–0.2)
BASOPHILS NFR BLD AUTO: 1 %
BUN SERPL-MCNC: 23 MG/DL (ref 6–24)
BUN/CREAT SERPL: 17 (ref 9–20)
CALCIUM SERPL-MCNC: 9.9 MG/DL (ref 8.7–10.2)
CHLORIDE SERPL-SCNC: 105 MMOL/L (ref 96–106)
CO2 SERPL-SCNC: 19 MMOL/L (ref 20–29)
CREAT SERPL-MCNC: 1.34 MG/DL (ref 0.76–1.27)
EGFRCR SERPLBLD CKD-EPI 2021: 63 ML/MIN/1.73
EOSINOPHIL # BLD AUTO: 0.2 X10E3/UL (ref 0–0.4)
EOSINOPHIL NFR BLD AUTO: 4 %
ERYTHROCYTE [DISTWIDTH] IN BLOOD BY AUTOMATED COUNT: 12.9 % (ref 11.6–15.4)
GLUCOSE SERPL-MCNC: 96 MG/DL (ref 70–99)
HCT VFR BLD AUTO: 42.3 % (ref 37.5–51)
HGB BLD-MCNC: 13.9 G/DL (ref 13–17.7)
HIV1 RNA # SERPL NAA+PROBE: <20 COPIES/ML
HIV1 RNA SERPL NAA+PROBE-LOG#: NORMAL LOG10COPY/ML
IMM GRANULOCYTES # BLD AUTO: 0 X10E3/UL (ref 0–0.1)
IMM GRANULOCYTES NFR BLD AUTO: 0 %
LYMPHOCYTES # BLD AUTO: 1.2 X10E3/UL (ref 0.7–3.1)
LYMPHOCYTES NFR BLD AUTO: 21 %
MCH RBC QN AUTO: 29.4 PG (ref 26.6–33)
MCHC RBC AUTO-ENTMCNC: 32.9 G/DL (ref 31.5–35.7)
MCV RBC AUTO: 90 FL (ref 79–97)
MONOCYTES # BLD AUTO: 0.5 X10E3/UL (ref 0.1–0.9)
MONOCYTES NFR BLD AUTO: 9 %
NEUTROPHILS # BLD AUTO: 3.7 X10E3/UL (ref 1.4–7)
NEUTROPHILS NFR BLD AUTO: 65 %
PLATELET # BLD AUTO: 238 X10E3/UL (ref 150–450)
POTASSIUM SERPL-SCNC: 4.6 MMOL/L (ref 3.5–5.2)
RBC # BLD AUTO: 4.72 X10E6/UL (ref 4.14–5.8)
SODIUM SERPL-SCNC: 140 MMOL/L (ref 134–144)
TSH SERPL DL<=0.005 MIU/L-ACNC: 2.07 UIU/ML (ref 0.45–4.5)
WBC # BLD AUTO: 5.8 X10E3/UL (ref 3.4–10.8)

## 2024-09-09 RX ORDER — PROPRANOLOL HYDROCHLORIDE 120 MG/1
120 CAPSULE, EXTENDED RELEASE ORAL DAILY
Qty: 90 CAPSULE | Refills: 0 | Status: SHIPPED | OUTPATIENT
Start: 2024-09-09 | End: 2024-09-10 | Stop reason: SDUPTHER

## 2024-09-10 DIAGNOSIS — I10 HYPERTENSION, BENIGN: ICD-10-CM

## 2024-09-10 RX ORDER — LOSARTAN POTASSIUM 100 MG/1
100 TABLET ORAL DAILY
Qty: 90 TABLET | Refills: 1 | Status: SHIPPED | OUTPATIENT
Start: 2024-09-10

## 2024-09-10 RX ORDER — PROPRANOLOL HYDROCHLORIDE 120 MG/1
120 CAPSULE, EXTENDED RELEASE ORAL DAILY
Qty: 90 CAPSULE | Refills: 1 | Status: SHIPPED | OUTPATIENT
Start: 2024-09-10

## 2024-09-10 RX ORDER — LOSARTAN POTASSIUM 100 MG/1
100 TABLET ORAL DAILY
Qty: 30 TABLET | Refills: 0 | OUTPATIENT
Start: 2024-09-10

## 2024-09-10 RX ORDER — OMEPRAZOLE 40 MG/1
40 CAPSULE, DELAYED RELEASE ORAL DAILY
Qty: 90 CAPSULE | Refills: 2 | Status: SHIPPED | OUTPATIENT
Start: 2024-09-10

## 2024-11-14 NOTE — TELEPHONE ENCOUNTER
Caller: RANDEE    Relationship: SELF     Best call back number:[phone]    Requested Prescriptions:   HYDROcodone-acetaminophen (NORCO)  MG per tablet [54546] (Order [id number])      Pharmacy where request should be sent:    [facility]  Last office visit with prescribing clinician: 4/7/2023   Last telemedicine visit with prescribing clinician: 7/7/2023   Next office visit with prescribing clinician: 7/7/2023     Additional details provided by patient:     Does the patient have less than a 3 day supply:  [] Yes  [x] No    Would you like a call back once the refill request has been completed: [] Yes [x] No    If the office needs to give you a call back, can they leave a voicemail: [] Yes [x] No    Christi Sarkar Rep   05/04/23 12:13 EDT             Subjective   The ABCs of the Annual Wellness Visit  Medicare Wellness Visit      Saul Webb is a 71 y.o. patient who presents for a Medicare Wellness Visit.    The following portions of the patient's history were reviewed and   updated as appropriate: allergies, current medications, past family history, past medical history, past social history, past surgical history, and problem list.    Compared to one year ago, the patient's physical   health is the same.  Compared to one year ago, the patient's mental   health is the same.    Recent Hospitalizations:  He was not admitted to the hospital during the last year.     Current Medical Providers:  Patient Care Team:  Rj Mcclendon MD as PCP - General (Internal Medicine)  Shaheen April, APRANDRÉS as Nurse Practitioner (Nurse Practitioner)  Scott Chopra MD as Consulting Physician (General Surgery)    Outpatient Medications Prior to Visit   Medication Sig Dispense Refill    albuterol sulfate  (90 Base) MCG/ACT inhaler INHALE 2 PUFFS BY MOUTH EVERY 4 HOURS AS NEEDED FOR WHEEZING 8.5 g 3    amLODIPine-benazepril (LOTREL) 10-40 MG per capsule TAKE 1 CAPSULE BY MOUTH DAILY 90 capsule 3    aspirin 81 MG EC tablet Take 1 tablet by mouth Daily. Last dose 1 week ag0      cholecalciferol (VITAMIN D3) 25 MCG (1000 UT) tablet Take 1 tablet by mouth Daily.      Fluticasone-Umeclidin-Vilant (Trelegy Ellipta) 100-62.5-25 MCG/ACT inhaler INHALE 1 PUFF BY MOUTH DAILY 60 each 5    furosemide (LASIX) 20 MG tablet TAKE 1 TABLET BY MOUTH EVERY DAY 90 tablet 1    potassium chloride (KLOR-CON) 8 MEQ CR tablet TAKE 1 TABLET BY MOUTH DAILY 90 tablet 3    simvastatin (ZOCOR) 40 MG tablet TAKE 1 TABLET BY MOUTH EVERY NIGHT AT BEDTIME 90 tablet 4     No facility-administered medications prior to visit.     No opioid medication identified on active medication list. I have reviewed chart for other potential  high risk medication/s and harmful drug interactions in the  "elderly.      Aspirin is on active medication list. Aspirin use is indicated based on review of current medical condition/s. Pros and cons of this therapy have been discussed today. Benefits of this medication outweigh potential harm.  Patient has been encouraged to continue taking this medication.  .      Patient Active Problem List   Diagnosis    Screening PSA (prostate specific antigen)    History of colonic polyps    COPD with chronic bronchitis    Mixed hyperlipidemia    Hypertension, essential    Elevated liver enzymes    Coronary artery disease involving native coronary artery of native heart without angina pectoris    IFG (impaired fasting glucose)    S/P colonoscopy    Bright red rectal bleeding    Medicare annual wellness visit, subsequent    Hyponatremia    Acute diastolic CHF (congestive heart failure)     Advance Care Planning Advance Directive is on file.  ACP discussion was held with the patient during this visit. Patient has an advance directive in EMR which is still valid.             Objective   Vitals:    11/14/24 1105   BP: 162/82   BP Location: Right arm   Patient Position: Sitting   Pulse: 74   Temp: 97.8 °F (36.6 °C)   SpO2: 96%   Weight: 108 kg (239 lb)   Height: 195.6 cm (77.01\")   PainSc: 0-No pain       Estimated body mass index is 28.34 kg/m² as calculated from the following:    Height as of this encounter: 195.6 cm (77.01\").    Weight as of this encounter: 108 kg (239 lb).            Does the patient have evidence of cognitive impairment? No                                                                                               Health  Risk Assessment    Smoking Status:  Social History     Tobacco Use   Smoking Status Every Day    Current packs/day: 2.00    Average packs/day: 2.0 packs/day for 56.9 years (113.7 ttl pk-yrs)    Types: Cigarettes    Start date: 1968   Smokeless Tobacco Former    Types: Chew    Quit date: 11/4/1976   Tobacco Comments    SMOKED THIS AM      Alcohol " Consumption:  Social History     Substance and Sexual Activity   Alcohol Use Yes    Comment: drinks daily; liquor       Fall Risk Screen  PILAR Fall Risk Assessment was completed, and patient is at LOW risk for falls.Assessment completed on:2024    Depression Screening   Little interest or pleasure in doing things? Not at all   Feeling down, depressed, or hopeless? Not at all   PHQ-2 Total Score 0      Health Habits and Functional and Cognitive Screenin/14/2024    11:10 AM   Functional & Cognitive Status   Do you have difficulty preparing food and eating? No   Do you have difficulty bathing yourself, getting dressed or grooming yourself? No   Do you have difficulty using the toilet? No   Do you have difficulty moving around from place to place? No   Do you have trouble with steps or getting out of a bed or a chair? No   Current Diet Well Balanced Diet   Dental Exam Up to date   Eye Exam Up to date   Exercise (times per week) 0 times per week   Current Exercises Include No Regular Exercise   Do you need help using the phone?  No   Are you deaf or do you have serious difficulty hearing?  Yes   Do you need help to go to places out of walking distance? No   Do you need help shopping? No   Do you need help preparing meals?  No   Do you need help with housework?  No   Do you need help with laundry? No   Do you need help taking your medications? No   Do you need help managing money? No   Do you ever drive or ride in a car without wearing a seat belt? No   Have you felt unusual stress, anger or loneliness in the last month? No   Who do you live with? Alone   If you need help, do you have trouble finding someone available to you? No   Have you been bothered in the last four weeks by sexual problems? No   Do you have difficulty concentrating, remembering or making decisions? No           Age-appropriate Screening Schedule:  Refer to the list below for future screening recommendations based on patient's age,  sex and/or medical conditions. Orders for these recommended tests are listed in the plan section. The patient has been provided with a written plan.    Health Maintenance List  Health Maintenance   Topic Date Due    Pneumococcal Vaccine 65+ (1 of 2 - PCV) Never done    TDAP/TD VACCINES (1 - Tdap) Never done    ZOSTER VACCINE (1 of 2) Never done    INFLUENZA VACCINE  Never done    COVID-19 Vaccine (1 - 2024-25 season) Never done    LIPID PANEL  05/08/2025    BMI FOLLOWUP  05/14/2025    LUNG CANCER SCREENING  10/30/2025    ANNUAL WELLNESS VISIT  11/14/2025    COLORECTAL CANCER SCREENING  12/05/2027    HEPATITIS C SCREENING  Completed    AAA SCREEN (ONE-TIME)  Completed                                                                                                                                                CMS Preventative Services Quick Reference  Risk Factors Identified During Encounter  Alcohol Misuse: Patient encouraged to limit alcohol use to no more than 1 standard alcoholic beverage per day. (12 ounce beer, 6 ounce wine, one shot liquor)  Tobacco Use/Dependance Risk (use dotphrase .tobaccocessation for documentation)    The above risks/problems have been discussed with the patient.  Pertinent information has been shared with the patient in the After Visit Summary.  An After Visit Summary and PPPS were made available to the patient.    Follow Up:   Next Medicare Wellness visit to be scheduled in 1 year.     Assessment & Plan  Medicare annual wellness visit, subsequent    Orders:    Lipid Panel; Future    Comprehensive Metabolic Panel; Future    CBC & Differential; Future    TSH+Free T4; Future    proBNP; Future    Coronary artery disease involving native coronary artery of native heart without angina pectoris  Coronary Artery Disease (OPTIONAL): Coronary artery disease is stable.  Continue current treatment regimen.  Cardiac status will be reassessed in 6 months.    Orders:    Lipid Panel; Future     Comprehensive Metabolic Panel; Future    CBC & Differential; Future    TSH+Free T4; Future    proBNP; Future    Screening PSA (prostate specific antigen)    Orders:    Lipid Panel; Future    Comprehensive Metabolic Panel; Future    CBC & Differential; Future    TSH+Free T4; Future    proBNP; Future    COPD with chronic bronchitis  COPD is stable.    Plan:  Continue same medication/s without change.    Discussed medication dosage, use, side effects, and goals of treatment in detail.    Warning signs of respiratory distress were reviewed with the patient. .    Patient Treatment Goals:   symptom prevention, minimizing limitation in activity, prevention of exacerbations and use of ER/inpatient care, maintenance of optimal pulmonary function, and minimization of adverse effects of treatment    Followup in 6 months.    Orders:    Lipid Panel; Future    Comprehensive Metabolic Panel; Future    CBC & Differential; Future    TSH+Free T4; Future    proBNP; Future    IFG (impaired fasting glucose)    Orders:    Lipid Panel; Future    Comprehensive Metabolic Panel; Future    CBC & Differential; Future    TSH+Free T4; Future    proBNP; Future    Mixed hyperlipidemia   Lipid abnormalities are stable    Plan:  Continue same medication/s without change.      Discussed medication dosage, use, side effects, and goals of treatment in detail.    Counseled patient on lifestyle modifications to help control hyperlipidemia.     Patient Treatment Goals:   LDL goal is less than 70    Followup in 6 months.    Orders:    Lipid Panel; Future    Comprehensive Metabolic Panel; Future    CBC & Differential; Future    TSH+Free T4; Future    proBNP; Future    Hypertension, essential  Hypertension is stable and controlled  Continue current treatment regimen.  Blood pressure will be reassessed in 6 months.    Orders:    Lipid Panel; Future    Comprehensive Metabolic Panel; Future    CBC & Differential; Future    TSH+Free T4; Future    proBNP;  Future    History of colonic polyps    Orders:    Lipid Panel; Future    Comprehensive Metabolic Panel; Future    CBC & Differential; Future    TSH+Free T4; Future    proBNP; Future    Elevated liver enzymes    Orders:    Lipid Panel; Future    Comprehensive Metabolic Panel; Future    CBC & Differential; Future    TSH+Free T4; Future    proBNP; Future    Hyponatremia    Orders:    Lipid Panel; Future    Comprehensive Metabolic Panel; Future    CBC & Differential; Future    TSH+Free T4; Future    proBNP; Future    Acute diastolic CHF (congestive heart failure)  Congestive heart failure due to hypertension.  Heart failure is stable.  NYHA Class II.  Continue current treatment regimen.  Heart failure will be reassessed in 6 months.        Orders:    Lipid Panel; Future    Comprehensive Metabolic Panel; Future    CBC & Differential; Future    TSH+Free T4; Future    proBNP; Future    Adult Transthoracic Echo Complete W/ Cont if Necessary Per Protocol; Future         Follow Up:   Return in about 6 months (around 5/14/2025).

## 2024-11-15 ENCOUNTER — OFFICE VISIT (OUTPATIENT)
Dept: PAIN MEDICINE | Facility: CLINIC | Age: 54
End: 2024-11-15
Payer: COMMERCIAL

## 2024-11-15 VITALS
WEIGHT: 246.2 LBS | HEIGHT: 68 IN | BODY MASS INDEX: 37.31 KG/M2 | OXYGEN SATURATION: 97 % | TEMPERATURE: 98.1 F | DIASTOLIC BLOOD PRESSURE: 86 MMHG | HEART RATE: 63 BPM | SYSTOLIC BLOOD PRESSURE: 120 MMHG

## 2024-11-15 DIAGNOSIS — M79.2 NEUROPATHIC PAIN, ARM: ICD-10-CM

## 2024-11-15 DIAGNOSIS — G54.0 BRACHIAL PLEXUS NEUROPATHY: ICD-10-CM

## 2024-11-15 DIAGNOSIS — Z79.899 ENCOUNTER FOR LONG-TERM (CURRENT) USE OF HIGH-RISK MEDICATION: ICD-10-CM

## 2024-11-15 DIAGNOSIS — Q85.00 NEUROFIBROMATOSIS: ICD-10-CM

## 2024-11-15 DIAGNOSIS — G89.4 CHRONIC PAIN SYNDROME: Primary | ICD-10-CM

## 2024-11-15 RX ORDER — HYDROCODONE BITARTRATE AND ACETAMINOPHEN 10; 325 MG/1; MG/1
1 TABLET ORAL EVERY 6 HOURS PRN
Qty: 60 TABLET | Refills: 0 | Status: SHIPPED | OUTPATIENT
Start: 2024-11-15

## 2024-11-15 NOTE — PROGRESS NOTES
CHIEF COMPLAINT  F/u neck pain- patient states that his pain has remained the same since his last visit.     Subjective   Isma De Luna is a 54 y.o. male  who presents for follow-up.  He has a history of neck and UE pain pain related to neurofibromatosis.  Today his pain is 1/10VAS in severity. Taking hydrocodone 10/325 which utilizes extremely sparingly, medication typically lasts 4-5 months. He also continues with Horizant 600 mg BID, Cymbalta 60 mg daily. Reports moderate pain reduction with regimen and denies adverse reactions. He continues working full time.       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 He is a patient at the [facility] but also follows with local team neurology/neuro oncology/radiation oncology.  He has had multiple surgeries to remove tumors related to the disease, most recent was 4/13/2022 removal of right brachial plexus tumors.  Surveillance MRI's of the spine and brachial plexus every 3-6 months.  He has also had successful radiation treatment to one of the schwannomas.    He is now having Proton B radiation treatment of lumbar tumors/schwannomas.  Neurological symptoms improving.      Renal cell carcinoma has returned. Has consulted with oncology at [facility], planning on cryoablation and possibly radiation therapy.      Neck Pain   This is a chronic problem. The current episode started more than 1 month ago. The problem occurs constantly. The problem has been waxing and waning. The pain is present in the midline and right side. The quality of the pain is described as aching, cramping and shooting. The pain is at a severity of 1/10. The pain is mild. The symptoms are aggravated by twisting. Associated symptoms include numbness. Pertinent negatives include no chest pain, fever, headaches or weakness. He has tried acetaminophen, bed rest, heat, ice, NSAIDs, oral narcotics, muscle relaxants, neck support and home exercises  (hydrocodone) for the symptoms. The treatment provided moderate relief.   Arm Pain   The pain is present in the left hand, right hand, upper left arm and upper right arm. The quality of the pain is described as aching (tingling). The pain radiates to the right arm and right hand. The pain is at a severity of 1/10. The pain is mild. The pain has been Fluctuating since the incident. Associated symptoms include numbness. Pertinent negatives include no chest pain.        PEG Assessment   What number best describes your pain on average in the past week?2  What number best describes how, during the past week, pain has interfered with your enjoyment of life?0  What number best describes how, during the past week, pain has interfered with your general activity?  0    Review of Pertinent Medical Data ---    The following portions of the patient's history were reviewed and updated as appropriate: allergies, current medications, past family history, past medical history, past social history, past surgical history, and problem list.    Review of Systems   Constitutional:  Negative for activity change (increased), chills, fatigue and fever.   HENT:  Positive for congestion.    Eyes:  Negative for visual disturbance.   Respiratory:  Negative for chest tightness and shortness of breath.    Cardiovascular:  Negative for chest pain.   Gastrointestinal:  Negative for abdominal pain, constipation and nausea.   Genitourinary:  Negative for difficulty urinating and dysuria.   Musculoskeletal:  Positive for neck pain.   Neurological:  Positive for numbness. Negative for dizziness, weakness, light-headedness and headaches.   Psychiatric/Behavioral:  Negative for agitation, self-injury, sleep disturbance and suicidal ideas. The patient is not nervous/anxious.      I have reviewed and confirmed the accuracy of the ROS as documented by the MA/LPN/RN EMILIE Shrestha    Vitals:    11/15/24 1452   BP: 120/86   Pulse: 63   Temp: 98.1 °F  "(36.7 °C)   SpO2: 97%   Weight: 112 kg (246 lb 3.2 oz)   Height: 172.7 cm (68\")   PainSc:   1   PainLoc: Neck         Objective   Physical Exam  Vitals and nursing note reviewed.   Constitutional:       General: He is not in acute distress.     Appearance: Normal appearance. He is not ill-appearing.   Pulmonary:      Effort: Pulmonary effort is normal. No respiratory distress.   Musculoskeletal:      Right shoulder: Tenderness present. No bony tenderness.      Right upper arm: Tenderness present.      Right hand: Tenderness present.      Cervical back: Tenderness and bony tenderness present.   Neurological:      Mental Status: He is alert and oriented to person, place, and time.      Motor: No weakness.      Gait: Gait is intact. Gait normal.   Psychiatric:         Mood and Affect: Mood normal.         Behavior: Behavior normal.         Assessment & Plan   Diagnoses and all orders for this visit:    1. Chronic pain syndrome (Primary)  -     HYDROcodone-acetaminophen (NORCO)  MG per tablet; Take 1 tablet by mouth Every 6 (Six) Hours As Needed for Severe Pain.  Dispense: 60 tablet; Refill: 0    2. Brachial plexus neuropathy  -     HYDROcodone-acetaminophen (NORCO)  MG per tablet; Take 1 tablet by mouth Every 6 (Six) Hours As Needed for Severe Pain.  Dispense: 60 tablet; Refill: 0    3. Neurofibromatosis    4. Neuropathic pain, arm    5. Encounter for long-term (current) use of high-risk medication        --- Continue Horizant   --- Refill Hydrocodone. Patient appears stable with current regimen. No adverse effects. Regarding continuation of opioids, there is no evidence of aberrant behavior or any red flags.  The patient continues with appropriate response to opioid therapy. ADL's remain intact by self.   --- The urine drug screen confirmation from 8/2/2024 has been reviewed and the result is appropriate based on patient history and BIRGIT report  --- The patient signed an updated copy of the controlled " substance agreement on 8/2/2024  --- Opioid risk very low     Isma De Luna reports a pain score of 1.  Given his pain assessment as noted, treatment options were discussed and the following options were decided upon as a follow-up plan to address the patient's pain: continuation of current treatment plan for pain.      --- Follow-up 3 months                  BIRGIT REPORT  As part of the patient's treatment plan, I am prescribing controlled substances. The patient has been made aware of appropriate use of such medications, including potential risk of somnolence, limited ability to drive and/or work safely, and the potential for dependence or overdose. It has also been made clear that these medications are for use by this patient only, without concomitant use of alcohol or other substances unless prescribed.     Patient has completed prescribing agreement detailing terms of continued prescribing of controlled substances, including monitoring BIRGIT reports, urine drug screening, and pill counts if necessary. The patient is aware that inappropriate use will results in cessation of prescribing such medications.    As the clinician, I personally reviewed the BIRGIT from 11/15/2024 while the patient was in the office today.    History and physical exam exhibit continued safe and appropriate use of controlled substances.       Dictated utilizing Dragon dictation.

## 2024-12-13 ENCOUNTER — PATIENT MESSAGE (OUTPATIENT)
Dept: INTERNAL MEDICINE | Facility: CLINIC | Age: 54
End: 2024-12-13
Payer: COMMERCIAL

## 2024-12-13 DIAGNOSIS — I10 ESSENTIAL HYPERTENSION: ICD-10-CM

## 2024-12-13 RX ORDER — AMLODIPINE BESYLATE 10 MG/1
10 TABLET ORAL DAILY
Qty: 90 TABLET | Refills: 1 | Status: SHIPPED | OUTPATIENT
Start: 2024-12-13

## 2024-12-13 RX ORDER — SILDENAFIL 100 MG/1
100 TABLET, FILM COATED ORAL DAILY PRN
Qty: 90 TABLET | Refills: 1 | Status: SHIPPED | OUTPATIENT
Start: 2024-12-13

## 2024-12-20 ENCOUNTER — OFFICE VISIT (OUTPATIENT)
Dept: INTERNAL MEDICINE | Facility: CLINIC | Age: 54
End: 2024-12-20
Payer: COMMERCIAL

## 2024-12-20 VITALS
SYSTOLIC BLOOD PRESSURE: 118 MMHG | WEIGHT: 250 LBS | BODY MASS INDEX: 37.89 KG/M2 | HEIGHT: 68 IN | HEART RATE: 59 BPM | OXYGEN SATURATION: 97 % | DIASTOLIC BLOOD PRESSURE: 70 MMHG | TEMPERATURE: 97.5 F

## 2024-12-20 DIAGNOSIS — F32.A DEPRESSION, UNSPECIFIED DEPRESSION TYPE: ICD-10-CM

## 2024-12-20 DIAGNOSIS — I10 ESSENTIAL HYPERTENSION: Primary | ICD-10-CM

## 2024-12-20 PROCEDURE — 99214 OFFICE O/P EST MOD 30 MIN: CPT | Performed by: STUDENT IN AN ORGANIZED HEALTH CARE EDUCATION/TRAINING PROGRAM

## 2024-12-20 NOTE — PROGRESS NOTES
Chief Complaint  4 mos follow-up    SUBJECTIVE    History of Present Illness    Isma De Luna is a 54 y.o. male who presents to the office today as an established patient that last saw me on 9/6/2024.     HTN: takes amlodipine 10 mg daily, propranolol  mg daily, losartan 100 mg daily. Checks BP at home and ranging 118-124/79-86 diastolic.     Depression: takes duloxetine 60 mg daily. Previously on Wellbutrin.  Has done grief counseling before. Not currently in counseling. Feels that he is doing better and getting through [place] and it is not as hard as it has been. Not feeling dragged down by the holidays as much as previously.     Allergies   Allergen Reactions    Molds & Smuts Other (See Comments)     Congestion, sneezing    Nsaids Other (See Comments)     HX: Kidney CA; radical left nephrectomy, partial right.    Pollen Extract Other (See Comments)     Other reaction(s): Other (See Comments)  Pollen, rag weed   Other reaction(s): Other (See Comments)  Pollen, rag weed         Outpatient Medications Marked as Taking for the 12/20/24 encounter (Office Visit) with Eloy Bryant, DO   Medication Sig Dispense Refill    amLODIPine (NORVASC) 10 MG tablet Take 1 tablet by mouth Daily. 90 tablet 1    cyclobenzaprine (FLEXERIL) 10 MG tablet Take 1 tablet by mouth 3 (Three) Times a Day As Needed for Muscle Spasms. for muscle spams 90 tablet 1    DULoxetine (CYMBALTA) 60 MG capsule Take 1 capsule by mouth Daily. 90 capsule 2    febuxostat (ULORIC) 40 MG tablet TAKE 1 TABLET BY MOUTH DAILY 90 tablet 2    fluticasone (VERAMYST) 27.5 MCG/SPRAY nasal spray Administer 2 sprays into the nostril(s) as directed by provider Daily.      Horizant 600 MG tablet controlled-release TAKE 1 TABLET BY MOUTH 2 (TWO) TIMES A DAY. 60 tablet 5    HYDROcodone-acetaminophen (NORCO)  MG per tablet Take 1 tablet by mouth Every 6 (Six) Hours As Needed for Severe Pain. 60 tablet 0    losartan (COZAAR) 100 MG tablet Take 1 tablet by  "mouth Daily. 90 tablet 1    Multiple Vitamin (MULTI-VITAMIN DAILY PO) Take  by mouth Daily.      omeprazole (priLOSEC) 40 MG capsule TAKE 1 CAPSULE BY MOUTH DAILY 90 capsule 2    propranolol LA (INDERAL LA) 120 MG 24 hr capsule Take 1 capsule by mouth Daily. 90 capsule 1    sildenafil (Viagra) 100 MG tablet Take 1 tablet by mouth Daily As Needed for Erectile Dysfunction. 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   Constitutional:       General: He is not in acute distress.     Appearance: He is obese. He is not ill-appearing.   Eyes:      General: No scleral icterus.  Cardiovascular:      Rate and Rhythm: Normal rate and regular rhythm.      Heart sounds: Normal heart sounds. No murmur heard.  Pulmonary:      Effort: Pulmonary effort is normal. No respiratory distress.      Breath sounds: Normal breath sounds. No wheezing.   Musculoskeletal:      Right lower leg: Edema (trace pretibial) present.      Left lower leg: Edema (trace pretibial) present.   Skin:     Coloration: Skin is not jaundiced.   Neurological:      Mental Status: He is alert.   Psychiatric:         Mood and Affect: Mood normal.         Behavior: Behavior normal.         Thought Content: Thought content normal.                             ASSESSMENT & PLAN     Diagnoses and all orders for this visit:    1. " Essential hypertension (Primary)  -takes amlodipine 10 mg daily, propranolol  mg daily, losartan 100 mg daily. Checks BP at home and ranging 118-124/79-86 diastolic.   -BP well-controlled at home as well as in office today at 118/70.I reviewed recent outside CMP from [facility] dated 11/8/2024 which showed overall normal renal function electrolytes.  Plan to continue current regimen.    2. Depression, unspecified depression type  -Good control on current medication of duloxetine 60 mg daily.  Continue current regimen at this time.          Follow Up  Return for Next scheduled follow up.    Patient/family had no further questions at this time and verbalized understanding of the plan discussed today.

## 2024-12-23 DIAGNOSIS — M79.2 NEUROPATHIC PAIN, ARM: ICD-10-CM

## 2024-12-23 DIAGNOSIS — Q85.00 NEUROFIBROMATOSIS: Primary | ICD-10-CM

## 2024-12-23 DIAGNOSIS — G54.0 BRACHIAL PLEXUS NEUROPATHY: ICD-10-CM

## 2024-12-23 RX ORDER — GABAPENTIN ENACARBIL 600 MG/1
1 TABLET, EXTENDED RELEASE ORAL 2 TIMES DAILY
Qty: 60 TABLET | Refills: 2 | Status: SHIPPED | OUTPATIENT
Start: 2024-12-23

## 2024-12-23 NOTE — TELEPHONE ENCOUNTER
Reviewed last office visit on 11/15/24. UDS and BIRGIT reviewed and are appropriate. Due to provider being out of office, will refill appropriately.    Covering for EMILIE Shrestha

## 2025-02-12 DIAGNOSIS — I10 HYPERTENSION, BENIGN: Primary | ICD-10-CM

## 2025-02-12 DIAGNOSIS — Z90.5 HISTORY OF NEPHRECTOMY: ICD-10-CM

## 2025-02-14 ENCOUNTER — OFFICE VISIT (OUTPATIENT)
Dept: PAIN MEDICINE | Facility: CLINIC | Age: 55
End: 2025-02-14
Payer: COMMERCIAL

## 2025-02-14 VITALS
SYSTOLIC BLOOD PRESSURE: 118 MMHG | HEIGHT: 68 IN | TEMPERATURE: 95.9 F | WEIGHT: 251.8 LBS | OXYGEN SATURATION: 96 % | BODY MASS INDEX: 38.16 KG/M2 | DIASTOLIC BLOOD PRESSURE: 76 MMHG | HEART RATE: 65 BPM

## 2025-02-14 DIAGNOSIS — M79.2 NEUROPATHIC PAIN, ARM: ICD-10-CM

## 2025-02-14 DIAGNOSIS — Q85.00 NEUROFIBROMATOSIS: ICD-10-CM

## 2025-02-14 DIAGNOSIS — G54.0 BRACHIAL PLEXUS NEUROPATHY: ICD-10-CM

## 2025-02-14 DIAGNOSIS — Z79.899 ENCOUNTER FOR LONG-TERM (CURRENT) USE OF HIGH-RISK MEDICATION: ICD-10-CM

## 2025-02-14 DIAGNOSIS — G89.4 CHRONIC PAIN SYNDROME: Primary | ICD-10-CM

## 2025-02-14 LAB
BUPRENORPHINE SAL CFM-MCNC: NEGATIVE NG/ML
POC AMPHETAMINES: NEGATIVE
POC BARBITURATES: NEGATIVE
POC BENZODIAZEPHINES: NEGATIVE
POC COCAINE: NEGATIVE
POC METHADONE: NEGATIVE
POC METHAMPHETAMINE SCREEN URINE: NEGATIVE
POC OPIATES: NEGATIVE
POC OXYCODONE: NEGATIVE
POC PHENCYCLIDINE: NEGATIVE
POC THC: NEGATIVE

## 2025-02-14 RX ORDER — CETIRIZINE HYDROCHLORIDE 10 MG/1
10 TABLET ORAL DAILY
COMMUNITY
Start: 2024-11-01

## 2025-02-14 NOTE — PROGRESS NOTES
CHIEF COMPLAINT    Neck/shoulder pain. The patient states the pain is unchanged from last visit.  UDS negative    Subjective   Isma De Luna is a 54 y.o. male  who presents for follow-up.  He has a history of neck pain, neuropathic pain and neurofibromatosis.  Today his pain is 1/10VAS in severity. Taking hydrocodone 10/325 which utilizes extremely sparingly, medication typically lasts 4-5 months. He also continues with Horizant 600 mg BID, Cymbalta 60 mg daily. Reports moderate pain reduction with regimen and denies adverse reactions. He continues working full time.       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 He is a patient at the [facility] but also follows with local team neurology/neuro oncology/radiation oncology.  He has had multiple surgeries to remove tumors related to the disease, most recent was 4/13/2022 removal of right brachial plexus tumors.  Surveillance MRI's of the spine and brachial plexus every 3-6 months.  He has also had successful radiation treatment to one of the schwannomas.     Recently completed Proton B radiation treatment of lumbar tumors/schwannomas.  Neurological symptoms have improved significantly.  F/u scans are improved.       Renal cell carcinoma has returned. Has consulted with oncology at [facility].  Plan at this time is to monitor with scans every 6 months.    If the tumor grows the plan will be cryotherapy, not a candidate for surgery due to previous partial nephrectomy.      Neck Pain   This is a chronic problem. The current episode started more than 1 month ago. The problem occurs constantly. The problem has been waxing and waning. The pain is present in the midline and right side. The quality of the pain is described as aching, cramping and shooting. The pain is at a severity of 0/10. The pain is mild. The symptoms are aggravated by twisting. Associated symptoms include numbness (left arm). Pertinent negatives  include no chest pain, fever, headaches or weakness. He has tried acetaminophen, bed rest, heat, ice, NSAIDs, oral narcotics, muscle relaxants, neck support and home exercises (hydrocodone) for the symptoms. The treatment provided moderate relief.   Arm Pain   The pain is present in the left hand, right hand, upper left arm and upper right arm. The quality of the pain is described as aching (tingling). The pain radiates to the right arm and right hand. The pain is at a severity of 0/10. The pain is mild. The pain has been Fluctuating since the incident. Associated symptoms include numbness (left arm). Pertinent negatives include no chest pain.        PEG Assessment   What number best describes your pain on average in the past week?1  What number best describes how, during the past week, pain has interfered with your enjoyment of life?0  What number best describes how, during the past week, pain has interfered with your general activity?  0    Review of Pertinent Medical Data ---    The following portions of the patient's history were reviewed and updated as appropriate: allergies, current medications, past family history, past medical history, past social history, past surgical history, and problem list.    Review of Systems   Constitutional:  Negative for chills and fever.   Respiratory:  Negative for cough and shortness of breath.    Cardiovascular:  Negative for chest pain.   Gastrointestinal:  Negative for constipation and diarrhea.   Genitourinary:  Negative for difficulty urinating.   Musculoskeletal:  Positive for neck pain.   Neurological:  Positive for numbness (left arm). Negative for dizziness, weakness, light-headedness and headaches.   Psychiatric/Behavioral:  Negative for agitation.      I have reviewed and confirmed the accuracy of the ROS as documented by the MA/EMRE/RN EMILIE Shrestha    Vitals:    02/14/25 1455   BP: 118/76   BP Location: Right arm   Patient Position: Sitting   Pulse: 65  "  Temp: 95.9 °F (35.5 °C)   TempSrc: Temporal   SpO2: 96%   Weight: 114 kg (251 lb 12.8 oz)   Height: 172.7 cm (68\")   PainSc: 0-No pain   PainLoc: Neck         Objective   Physical Exam  Vitals and nursing note reviewed.   Constitutional:       General: He is not in acute distress.     Appearance: Normal appearance. He is not ill-appearing.   Pulmonary:      Effort: Pulmonary effort is normal. No respiratory distress.   Musculoskeletal:      Right shoulder: Tenderness present. No bony tenderness.      Right upper arm: Tenderness present.      Right hand: Tenderness present.      Cervical back: Tenderness and bony tenderness present.   Neurological:      Mental Status: He is alert and oriented to person, place, and time.      Motor: No weakness.      Gait: Gait is intact. Gait normal.   Psychiatric:         Mood and Affect: Mood normal.         Behavior: Behavior normal.         Assessment & Plan   Diagnoses and all orders for this visit:    1. Chronic pain syndrome (Primary)    2. Neuropathic pain, arm    3. Brachial plexus neuropathy    4. Neurofibromatosis    5. Encounter for long-term (current) use of high-risk medication      --- Continue Horizant, Hydrocodone. Refills not needed. Patient appears stable with current regimen. No adverse effects. Regarding continuation of opioids, there is no evidence of aberrant behavior or any red flags.  The patient continues with appropriate response to opioid therapy. ADL's remain intact by self.   --- Routine UDS in office today as part of monitoring requirements for controlled substances.  The specimen was viewed and the immunoassay result reviewed and is NEG (appropriate - sparing use of Norco).  This specimen will be sent to [facility] for confirmation.     --- The patient signed an updated copy of the controlled substance agreement on 8/2/2024  --- Opioid risk low - sparing use of medication     Isma De Luna reports a pain score of 0.  Given his pain " assessment as noted, treatment options were discussed and the following options were decided upon as a follow-up plan to address the patient's pain: continuation of current treatment plan for pain.      --- Follow-up 3 months/sooner PRN                 BIRGIT REPORT  As part of the patient's treatment plan, I am prescribing controlled substances. The patient has been made aware of appropriate use of such medications, including potential risk of somnolence, limited ability to drive and/or work safely, and the potential for dependence or overdose. It has also been made clear that these medications are for use by this patient only, without concomitant use of alcohol or other substances unless prescribed.     Patient has completed prescribing agreement detailing terms of continued prescribing of controlled substances, including monitoring BIRGIT reports, urine drug screening, and pill counts if necessary. The patient is aware that inappropriate use will results in cessation of prescribing such medications.    As the clinician, I personally reviewed the BIRGIT from 2/14/2025 while the patient was in the office today.    History and physical exam exhibit continued safe and appropriate use of controlled substances.       Dictated utilizing Dragon dictation.

## 2025-03-04 DIAGNOSIS — I10 HYPERTENSION, BENIGN: ICD-10-CM

## 2025-03-05 RX ORDER — LOSARTAN POTASSIUM 100 MG/1
100 TABLET ORAL DAILY
Qty: 90 TABLET | Refills: 1 | Status: SHIPPED | OUTPATIENT
Start: 2025-03-05

## 2025-03-23 DIAGNOSIS — Q85.00 NEUROFIBROMATOSIS: ICD-10-CM

## 2025-03-23 DIAGNOSIS — G54.0 BRACHIAL PLEXUS NEUROPATHY: ICD-10-CM

## 2025-03-23 DIAGNOSIS — M79.2 NEUROPATHIC PAIN, ARM: ICD-10-CM

## 2025-03-25 RX ORDER — GABAPENTIN ENACARBIL 600 MG/1
1 TABLET, EXTENDED RELEASE ORAL 2 TIMES DAILY
Qty: 60 TABLET | Refills: 2 | Status: SHIPPED | OUTPATIENT
Start: 2025-03-25

## 2025-04-11 ENCOUNTER — OFFICE VISIT (OUTPATIENT)
Dept: INTERNAL MEDICINE | Facility: CLINIC | Age: 55
End: 2025-04-11
Payer: COMMERCIAL

## 2025-04-11 VITALS
TEMPERATURE: 97.7 F | BODY MASS INDEX: 38.43 KG/M2 | HEART RATE: 65 BPM | WEIGHT: 253.6 LBS | DIASTOLIC BLOOD PRESSURE: 80 MMHG | HEIGHT: 68 IN | SYSTOLIC BLOOD PRESSURE: 118 MMHG | OXYGEN SATURATION: 97 %

## 2025-04-11 DIAGNOSIS — Z11.4 ENCOUNTER FOR SCREENING FOR HIV: ICD-10-CM

## 2025-04-11 DIAGNOSIS — K21.9 GASTROESOPHAGEAL REFLUX DISEASE, UNSPECIFIED WHETHER ESOPHAGITIS PRESENT: ICD-10-CM

## 2025-04-11 DIAGNOSIS — Z23 NEED FOR PNEUMOCOCCAL VACCINE: ICD-10-CM

## 2025-04-11 DIAGNOSIS — E78.2 MIXED HYPERLIPIDEMIA: ICD-10-CM

## 2025-04-11 DIAGNOSIS — Z78.9 MEASLES, MUMPS, RUBELLA (MMR) VACCINATION STATUS UNKNOWN: ICD-10-CM

## 2025-04-11 DIAGNOSIS — R73.03 PREDIABETES: ICD-10-CM

## 2025-04-11 DIAGNOSIS — Z00.00 ANNUAL PHYSICAL EXAM: Primary | ICD-10-CM

## 2025-04-11 RX ORDER — FAMOTIDINE 20 MG/1
20 TABLET, FILM COATED ORAL 2 TIMES DAILY
COMMUNITY
Start: 2025-02-27

## 2025-04-11 NOTE — PROGRESS NOTES
"  Eloy Bryant DO, FACP  Internal Medicine  [facility]  [address], [address]  [place] [address]  [phone]    Chief Complaint  Annual checkup    HISTORY    Isma De Luna is a 55 y.o. male who presents to the office today as a  an established patient for their annual preventative exam.      No hospitalization(s) within the last year.     Current exercise regimen: \"some\" mostly walking fast 2-3 times weekly    Status of chronic medical conditions:    Allergies: allergic to pollen, ragweed, dust; alternates between Zyrtec and allegra and uses flonase      ED: takes sildenafil  mg daily as needed     Gout: no further attacks since starting febuxostat 40 mg daily   Lab Results   Component Value Date    URICACID 5.7 06/30/2023      GERD: states nephrology recently switched him from omeprazole 40 mg daily to Pepcid which he has been taking two times daily but has taken a few days 3 times daily. Also taking tums frequently.  States this is not going well and acid reflux has now started cutting into sleep.      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 He is a patient at the [facility].  He has undergone multiple surgical procedures related to this condition including larynx nerve sheath tumor removal in 2005, resection of 3 neurofibromas on spinal cord in 2010, removal of stomach schwannoma in 2014, resection of 2 tumors on left brachial plexus in 2018, excision of neurofibromas from right brachial plexus and C8 nerve root in 2022. Follows with [facility], had radiation treatment at [facility] [place].     Chronic pain syndrome, brachial plexus neuropathy: follows with [facility] , prescribed Horizant 600 mg twice daily and Norco  for breakthrough pain (usuallyonce every 3 months).      CKD 3, Kidney cancer s/p left nephrectomy with recurrence in the right kidney which was treated with partial nephrectomy and with " recurrence that is currently being monitored with [facility] and [facility]. Also follows with Nephrology.     HLD: not currently requiring medication   Lab Results   Component Value Date    CHOL 183 06/05/2019    CHLPL 191 10/07/2022    TRIG 155 (H) 10/07/2022    HDL 44 10/07/2022     (H) 10/07/2022     Prediabetes/ Obesity:unable to tolerate wegovy due to acid reflux symptoms. States he has gained a few pounds recently .   Lab Results   Component Value Date    HGBA1C 5.6 12/29/2023       HTN: takes amlodipine 10 mg daily, propranolol  mg daily, losartan 100 mg daily. Checks BP at home and averages 105-120/69-80.     Depression: takes duloxetine 60 mg daily. Previously on Wellbutrin.  Has done grief counseling before. Not currently in counseling. Mood is a little down lately but not to the point of needing medication adjustment.     Any other concerns regarding their health today: none    Health Maintenance Summary            Current Care Gaps       Pneumococcal Vaccine 50+ (3 of 3 - PCV20 or PCV21) Overdue since 11/21/2022 11/21/2017  Imm Admin: Pneumococcal Conjugate 13-Valent (PCV13)    09/01/2012  Imm Admin: Pneumococcal, Unspecified    09/01/2012  Imm Admin: PEDS-Pneumococcal Conjugate (PCV7)    04/02/2012  Imm Admin: Pneumococcal Polysaccharide (PPSV23)              Hepatitis B (3 of 3 - 19+ 3-dose series) Overdue since 6/5/2024      04/10/2024  Imm Admin: Hepatitis B Adult/Adolescent IM    04/06/2023  Imm Admin: Hepatitis B Adult/Adolescent IM                      Awaiting Completion       LIPID PANEL (Yearly) Order placed this encounter      10/07/2022  Lipid panel    03/29/2022  Lipid Panel    11/23/2021  Lipid panel    06/25/2021  Lipid Panel    10/09/2020  Lipid Panel     Only the first 5 history entries have been loaded, but more history exists.                    Upcoming       INFLUENZA VACCINE (Yearly - July to March) Next due on 7/1/2025 09/27/2024  Outside  Immunization: Influenza, P-Free    10/06/2023  Imm Admin: Fluzone (or Fluarix & Flulaval for VFC) >6mos    10/06/2023  Imm Admin: Fluzone Quad >6mos (Multi-dose)    10/14/2022  Imm Admin: Fluzone (or Fluarix & Flulaval for VFC) >6mos    10/01/2021  Imm Admin: Fluzone (or Fluarix & Flulaval for VFC) >6mos      Only the first 5 history entries have been loaded, but more history exists.              ANNUAL PHYSICAL (Yearly) Next due on 4/11/2026 04/11/2025  Done    04/03/2024  Done    03/30/2023  Done    10/09/2020  Done              COLORECTAL CANCER SCREENING (COLONOSCOPY - Every 5 Years) Next due on 6/18/2026 06/18/2021  SCANNED - COLONOSCOPY    06/18/2021  Surgical Procedure: COLONOSCOPY              TDAP/TD VACCINES (4 - Td or Tdap) Next due on 10/4/2026      10/04/2016  Imm Admin: Tdap    06/03/2008  Imm Admin: Td (TDVAX)    01/01/1998  Imm Admin: Td (TDVAX)                      Completed or No Longer Recommended       HEPATITIS C SCREENING  Completed      12/29/2023  Hep C Virus Ab component of Hepatitis C Antibody    06/19/2020  Hep C Virus Ab component of Hepatitis C antibody              COVID-19 Vaccine (Series Information) Completed      09/27/2024  Imm Admin: COVID-19 (MODERNA) 12YRS+ (SPIKEVAX)    10/06/2023  Imm Admin: COVID-19 (MODERNA) 12YRS+ (SPIKEVAX)    10/14/2022  Imm Admin: COVID-19 (MODERNA) BIVALENT 12+YRS    07/15/2022  Imm Admin: COVID-19 (MODERNA) Monovalent Original Booster    10/26/2021  Imm Admin: COVID-19 (MODERNA) 1st,2nd,3rd Dose Monovalent      Only the first 5 history entries have been loaded, but more history exists.              Orthopox/Monkeypox (Series Information) Completed      11/21/2022  Imm Admin: Monkeypox/Smallpox ID ([email or web address])    10/21/2022  Imm Admin: Monkeypox/Smallpox ID ([email or web address])              ZOSTER VACCINE (Series Information) Completed      02/11/2022  Imm Admin: Shingrix    11/12/2021  Imm Admin: Shingrix                             Allergies   Allergen  Reactions    Molds & Smuts Other (See Comments)     Congestion, sneezing    Nsaids Other (See Comments)     HX: Kidney CA; radical left nephrectomy, partial right.    Pollen Extract Other (See Comments)     Other reaction(s): Other (See Comments)  Pollen, rag weed   Other reaction(s): Other (See Comments)  Pollen, rag weed         Outpatient Medications Marked as Taking for the 4/11/25 encounter (Office Visit) with Eloy Bryant,    Medication Sig Dispense Refill    amLODIPine (NORVASC) 10 MG tablet Take 1 tablet by mouth Daily. 90 tablet 1    cetirizine (ZyrTEC Allergy) 10 MG tablet Take 1 tablet by mouth Daily.      cyclobenzaprine (FLEXERIL) 10 MG tablet Take 1 tablet by mouth 3 (Three) Times a Day As Needed for Muscle Spasms. for muscle spams 90 tablet 1    DULoxetine (CYMBALTA) 60 MG capsule Take 1 capsule by mouth Daily. 90 capsule 2    famotidine (PEPCID) 20 MG tablet Take 1 tablet by mouth 2 (Two) Times a Day.      febuxostat (ULORIC) 40 MG tablet TAKE 1 TABLET BY MOUTH DAILY 90 tablet 2    fluticasone (VERAMYST) 27.5 MCG/SPRAY nasal spray Administer 2 sprays into the nostril(s) as directed by provider Daily.      Horizant 600 MG tablet controlled-release TAKE ONE TABLET BY MOUTH TWICE DAILY 60 tablet 2    HYDROcodone-acetaminophen (NORCO)  MG per tablet Take 1 tablet by mouth Every 6 (Six) Hours As Needed for Severe Pain. 60 tablet 0    losartan (COZAAR) 100 MG tablet TAKE 1 TABLET BY MOUTH EVERY DAY 90 tablet 1    Multiple Vitamin (MULTI-VITAMIN DAILY PO) Take  by mouth Daily.      propranolol LA (INDERAL LA) 120 MG 24 hr capsule Take 1 capsule by mouth Daily. 90 capsule 1    sildenafil (Viagra) 100 MG tablet Take 1 tablet by mouth Daily As Needed for Erectile Dysfunction. 90 tablet 1       Past Medical History:   Diagnosis Date    Backache     Chronic pain     Chronic tonsillitis     CKD (chronic kidney disease)     Depression     Dyspepsia     ED (erectile dysfunction)     Gout     History of  kidney cancer     Hyperlipidemia     Hypertension     Inguinal hernia     Kidney disease     Low back pain     Neck pain     Nerve sheath tumor     on Larynx    PONV (postoperative nausea and vomiting)     Prediabetes     Renal cancer     Schwannomatosis     Tenosynovitis, de Quervain     Left Wrist    Vitamin B12 deficiency      Past Surgical History:   Procedure Laterality Date    CARPAL TUNNEL RELEASE Left 2018    CHOLECYSTECTOMY  2007    COLONOSCOPY N/A 2021    Procedure: COLONOSCOPY WITH POLYPECTOMY (HOT SNARE);  Surgeon: Octavio Novak Jr., MD;  Location: [facility] ENDOSCOPY;  Service: General;  Laterality: N/A;  PRE-- SCREENING  POST-- POLYPS    HYDROCELECTOMY Left 19    INGUINAL HERNIA REPAIR Right     LYMPH NODE BIOPSY      Dr George    NECK SURGERY      NEPHRECTOMY Left     radical    NEPHRECTOMY PARTIAL Right     NEUROFIBROMA EXCISION Left 2018    left side of neck    NEUROFIBROMA EXCISION Right 2022    EXCISION PERIPHERAL NERVE TUMOR, COMPLEX, brachial plexus tumors, 2 vs 3, right.    SPINE SURGERY      T12-L1 neurofibromas removed     Family History   Problem Relation Age of Onset    Ovarian cancer Mother          age 62    Uterine cancer Mother     Cancer Mother     Early death Mother     Hyperlipidemia Mother     Hypertension Mother     Cancer Father          age 43    Early death Father     Kidney cancer Brother     Depression Brother     Early death Brother     MIKY disease Brother     Hypertension Brother     Cancer Maternal Grandmother     Heart disease Paternal Grandmother     Stroke Paternal Grandmother     Diabetes Paternal Grandmother     reports that he has never smoked. He has never used smokeless tobacco. He reports that he does not currently use alcohol. He reports that he does not use drugs.    Immunization History   Administered Date(s) Administered    COVID-19 (MODERNA) 12YRS+ (SPIKEVAX) 10/06/2023, 2024    COVID-19 (MODERNA)  "1st,2nd,3rd Dose Monovalent 03/08/2021, 03/08/2021, 04/07/2021, 04/07/2021, 10/26/2021    COVID-19 (MODERNA) BIVALENT 12+YRS 10/14/2022    COVID-19 (MODERNA) Monovalent Original Booster 07/15/2022    FluMist 2-49yrs 11/21/2017    Fluzone (or Fluarix & Flulaval for VFC) >6mos 08/21/2020, 10/01/2021, 10/14/2022, 10/06/2023    Fluzone Quad >6mos (Multi-dose) 10/06/2023    Hepatitis A 06/12/2018, 02/21/2019, 05/21/2019    Hepatitis B Adult/Adolescent IM 04/06/2023, 04/10/2024    Influenza TIV (IM) 09/01/2013, 10/21/2014, 10/04/2016    Influenza, Unspecified 11/16/2018    Monkeypox/Smallpox ID ([email or web address]) 10/21/2022, 11/21/2022    PEDS-Pneumococcal Conjugate (PCV7) 09/01/2012    Pneumococcal Conjugate 13-Valent (PCV13) 11/21/2017    Pneumococcal Conjugate Unspecified 09/01/2012    Pneumococcal Polysaccharide (PPSV23) 04/02/2012    Pneumococcal, Unspecified 09/01/2012    Shingrix 11/12/2021, 02/11/2022    Td (TDVAX) 01/01/1998, 06/03/2008    Tdap 10/04/2016        OBJECTIVE    Vital Signs:   /80   Pulse 65   Temp 97.7 °F (36.5 °C) (Infrared)   Ht 172.7 cm (68\")   Wt 115 kg (253 lb 9.6 oz)   SpO2 97%   BMI 38.56 kg/m²     Physical Exam  Vitals reviewed.   Constitutional:       General: He is not in acute distress.     Appearance: Normal appearance. He is obese. He is not ill-appearing.   HENT:      Head: Normocephalic and atraumatic.      Right Ear: Tympanic membrane, ear canal and external ear normal. There is no impacted cerumen.      Left Ear: Tympanic membrane, ear canal and external ear normal. There is no impacted cerumen.      Mouth/Throat:      Mouth: Mucous membranes are moist.      Pharynx: No oropharyngeal exudate or posterior oropharyngeal erythema.      Comments: Mallampati class III  Eyes:      General: No scleral icterus.     Extraocular Movements: Extraocular movements intact.      Conjunctiva/sclera: Conjunctivae normal.      Pupils: Pupils are equal, round, and reactive to light. "   Cardiovascular:      Rate and Rhythm: Normal rate and regular rhythm. Rhythm irregular.      Heart sounds: Normal heart sounds. No murmur heard.  Pulmonary:      Effort: Pulmonary effort is normal. No respiratory distress.      Breath sounds: Normal breath sounds. No wheezing.   Abdominal:      General: Bowel sounds are normal. There is no distension.      Palpations: Abdomen is soft.      Tenderness: There is no abdominal tenderness. There is no guarding.   Musculoskeletal:      Cervical back: Neck supple.      Right lower leg: No edema.      Left lower leg: No edema.   Lymphadenopathy:      Cervical: No cervical adenopathy.   Skin:     General: Skin is warm and dry.      Coloration: Skin is not jaundiced.   Neurological:      General: No focal deficit present.      Mental Status: He is alert and oriented to person, place, and time.      Cranial Nerves: Cranial nerve deficit (present but muted sensation left V3 territory) present.      Motor: No weakness.   Psychiatric:         Mood and Affect: Mood normal.         Behavior: Behavior normal.         Thought Content: Thought content normal.                   The 10-year ASCVD risk score (Montrell GOLDSTEIN, et al., 2019) is: 6%    Values used to calculate the score:      Age: 55 years      Sex: Male      Is Non- : No      Diabetic: No      Tobacco smoker: No      Systolic Blood Pressure: 118 mmHg      Is BP treated: Yes      HDL Cholesterol: 44 mg/dL      Total Cholesterol: 191 mg/dL           ASSESSMENT & PLAN     Annual Preventative Health Examination  -Age and sex appropriate physical exam performed and documented. Updated past medical, family, social and surgical histories as well as allergies and care team list. Addressed care gaps listed in the medical record.  -Encouraged annual dental and vision exams as part of their overall health.  -Encouraged minimum of 30 minutes or more of exercise at a brisk walk or higher 5 days per week combined  with a well-balanced diet.   -Immunizations reviewed and updated in EMR. Prevnar 20 (Pneumococcal 20-valent conjugate) recommended.  -Lipid screening:  Patient is over age 40 and a 10-year ASCVD risk was calculated which does not indicate a need for statin therapy.  -Aspirin for primary or secondary prevention: ASCVD risk calculate and aspirin for primary prevention is not indicated.  -Depression and Anxiety screening: Patient has known diagnosis of depression and / or anxiety.  -Diabetes screening: Patient has a diagnosis of pre-diabetes and is being monitored with yearly hemoglobin A1c.   -Tobacco use screening: Conducted and addressed if indicated.   -Alcohol use screening: Conducted and addressed if indicated.   -Illicit drug screening: Conducted and addressed if indicated.   -Abdominal aortic aneurysm screening: AAA screening is not indicated as patient is less than 65 years of age.  -Hypertension screening: Patient with known diagnosis of hypertension and is receiving treatment.  -HIV screening: Patient has already received HIV screening and it was negative. Patient accepted repeat screening today.   -Hepatitis C virus screening:  Patient has already completed Hepatitis C screening. Negative screening on file.   -Syphilis screening: pt declined screening  -Hepatitis B virus screening: Patient has already completed screening.  -Colon cancer screening: Patient is already up to date on their colon cancer screening with colonoscopy and screening is indicated again in 2026  -Lung cancer screening: Patient has never smoked.  -Prostate cancer screening: up to date at 0.9 at [facility] in Feb 2025.      Follow up in 1 year for annual physical exam.    Patient/family had no further questions at this time and verbalized understanding of the plan discussed today.     A problem-based visit was also conducted on the same day, see below for assessment and plan    Diagnoses and all orders for this visit:    1.  Gastroesophageal reflux disease, unspecified whether esophagitis present (Primary)  - states nephrology recently switched him from omeprazole 40 mg daily to Pepcid which he has been taking two times daily but has taken a few days 3 times daily. Also taking tums frequently.  States this is not going well and acid reflux has now started cutting into sleep.   -stressed that Pepcid is twice daily max medication. He should try to alternative. Tums with Gaviscon to reduce risk of hypercalcemia with excessive tums usage . In general it does not seem he is doing well with the abrupt discontinuation of PPI therapy. I recommend he reach out to his nephrology team to see if they are agreeable to him weaning off of omeprazole slowly as opposed to rapid discontinuation.     2. Prediabetes  3. Mixed hyperlipidemia  -stressed importance of dietary changes and exercise. Unfortunately unable to tolerate GLP1 RA medication in the past.  -recheck lipid profile and A1c for continued annual monitoring         The following social determinates of health impact the patient's medical decision making: No social determinates of health were factored in to today's visit.     Follow Up  Return in about 6 months (around 10/11/2025) for Recheck.

## 2025-04-11 NOTE — LETTER
[facility]  Vaccine Consent Form    Patient Name:  Isma De Luna  Patient :  1970     Vaccine(s) Ordered    Pneumococcal Conjugate Vaccine 20-Valent All        Screening Checklist  The following questions should be completed prior to vaccination. If you answer “yes” to any question, it does not necessarily mean you should not be vaccinated. It just means we may need to clarify or ask more questions. If a question is unclear, please ask your healthcare provider to explain it.    Yes No   Any fever or moderate to severe illness today (mild illness and/or antibiotic treatment are not contraindications)?     Do you have a history of a serious reaction to any previous vaccinations, such as anaphylaxis, encephalopathy within 7 days, Guillain-[place] syndrome within 6 weeks, seizure?     Have you received any live vaccine(s) (e.g MMR, CL) or any other vaccines in the last month (to ensure duplicate doses aren't given)?     Do you have an anaphylactic allergy to latex (DTaP, DTaP-IPV, Hep A, Hep B, MenB, RV, Td, Tdap), baker’s yeast (Hep B, HPV), polysorbates (RSV, nirsevimab, PCV 20, Rotavirrus, Tdap, Shingrix), or gelatin (CL, MMR)?     Do you have an anaphylactic allergy to neomycin (Rabies, CL, MMR, IPV, Hep A), polymyxin B (IPV), or streptomycin (IPV)?      Any cancer, leukemia, AIDS, or other immune system disorder? (CL, MMR, RV)     Do you have a parent, brother, or sister with an immune system problem (if immune competence of vaccine recipient clinically verified, can proceed)? (MMR, CL)     Any recent steroid treatments for >2 weeks, chemotherapy, or radiation treatment? (CL, MMR)     Have you received antibody-containing blood transfusions or IVIG in the past 11 months (recommended interval is dependent on product)? (MMR, CL)     Have you taken antiviral drugs (acyclovir, famciclovir, valacyclovir for CL) in the last 24 or 48 hours, respectively?      Are you pregnant or planning to become  "pregnant within 1 month? (CL, MMR, HPV, IPV, MenB, Abrexvy; For Hep B- refer to Engerix-B; For RSV - Abrysvo is indicated for 32-36 weeks of pregnancy from September to January)     For infants, have you ever been told your child has had intussusception or a medical emergency involving obstruction of the intestine (Rotavirus)? If not for an infant, can skip this question.         *Ordering Physicians/APC should be consulted if \"yes\" is checked by the patient or guardian above.  I have received, read, and understand the Vaccine Information Statement (VIS) for each vaccine ordered.  I have considered my or my child's health status as well as the health status of my close contacts.  I have taken the opportunity to discuss my vaccine questions with my or my child's health care provider.   I have requested that the ordered vaccine(s) be given to me or my child.  I understand the benefits and risks of the vaccines.  I understand that I should remain in the clinic for 15 minutes after receiving the vaccine(s).  _________________________________________________________  Signature of Patient or Parent/Legal Guardian ____________________  Date   "

## 2025-04-15 DIAGNOSIS — Z78.9 MEASLES, MUMPS, RUBELLA (MMR) VACCINATION STATUS UNKNOWN: ICD-10-CM

## 2025-04-15 DIAGNOSIS — E78.49 OTHER HYPERLIPIDEMIA: ICD-10-CM

## 2025-04-15 DIAGNOSIS — E78.49 OTHER HYPERLIPIDEMIA: Primary | ICD-10-CM

## 2025-04-15 DIAGNOSIS — Z11.4 SCREENING FOR HUMAN IMMUNODEFICIENCY VIRUS: ICD-10-CM

## 2025-04-15 DIAGNOSIS — R73.03 PRE-DIABETES: ICD-10-CM

## 2025-04-15 DIAGNOSIS — Z11.4 ENCOUNTER FOR HUMAN IMMUNODEFICIENCY VIRUS TEST: Primary | ICD-10-CM

## 2025-04-16 LAB
CHOLEST SERPL-MCNC: 189 MG/DL (ref 100–199)
HBA1C MFR BLD: 5.8 % (ref 4.8–5.6)
HDLC SERPL-MCNC: 39 MG/DL
HIV 1+2 AB+HIV1 P24 AG SERPL QL IA: NON REACTIVE
LDLC SERPL CALC-MCNC: 105 MG/DL (ref 0–99)
LDLC/HDLC SERPL: 2.7 RATIO (ref 0–3.6)
MEV IGG SER IA-ACNC: >300 AU/ML
MUV IGG SER IA-ACNC: 208 AU/ML
RUBV IGG SERPL IA-ACNC: 14.2 INDEX
TRIGL SERPL-MCNC: 262 MG/DL (ref 0–149)
VLDLC SERPL CALC-MCNC: 45 MG/DL (ref 5–40)

## 2025-05-23 ENCOUNTER — OFFICE VISIT (OUTPATIENT)
Dept: PAIN MEDICINE | Facility: CLINIC | Age: 55
End: 2025-05-23
Payer: COMMERCIAL

## 2025-05-23 VITALS
HEART RATE: 54 BPM | HEIGHT: 68 IN | WEIGHT: 247 LBS | RESPIRATION RATE: 18 BRPM | SYSTOLIC BLOOD PRESSURE: 137 MMHG | DIASTOLIC BLOOD PRESSURE: 85 MMHG | TEMPERATURE: 98 F | OXYGEN SATURATION: 97 % | BODY MASS INDEX: 37.44 KG/M2

## 2025-05-23 DIAGNOSIS — Z79.899 ENCOUNTER FOR LONG-TERM (CURRENT) USE OF HIGH-RISK MEDICATION: ICD-10-CM

## 2025-05-23 DIAGNOSIS — M54.2 NECK PAIN: ICD-10-CM

## 2025-05-23 DIAGNOSIS — Q85.00 NEUROFIBROMATOSIS: ICD-10-CM

## 2025-05-23 DIAGNOSIS — G54.0 BRACHIAL PLEXUS NEUROPATHY: ICD-10-CM

## 2025-05-23 DIAGNOSIS — G89.4 CHRONIC PAIN SYNDROME: Primary | ICD-10-CM

## 2025-05-23 PROCEDURE — 99214 OFFICE O/P EST MOD 30 MIN: CPT | Performed by: NURSE PRACTITIONER

## 2025-05-23 RX ORDER — CHOLECALCIFEROL (VITAMIN D3) 25 MCG
1000 TABLET ORAL 2 TIMES DAILY PRN
COMMUNITY

## 2025-05-23 RX ORDER — OMEPRAZOLE 20 MG/1
20 TABLET, DELAYED RELEASE ORAL
COMMUNITY

## 2025-05-23 NOTE — PROGRESS NOTES
CHIEF COMPLAINT  Neck pain   Shoulder Pain  Patient reports his pain is about the same since his last ov.    Subjective   Isma De Luna is a 55 y.o. male  who presents for follow-up.  He has a history of neck pain, neuropathic pain and neurofibromatosis.  Today his pain is 1/10VAS in severity (stable). Taking hydrocodone 10/325 which utilizes extremely sparingly, medication refill typically lasts 6 months or longer. He also continues with Horizant 600 mg BID, Cymbalta 60 mg daily. Reports moderate pain reduction with regimen and denies adverse reactions. He continues working full time.       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 He is a patient at the [facility] but also follows with local team neurology/neuro oncology/radiation oncology.  He has had multiple surgeries to remove tumors related to the disease, most recent was 4/13/2022 removal of right brachial plexus tumors.  Surveillance MRI's of the spine and brachial plexus every 3-6 months.  He has also had successful radiation treatment to one of the schwannomas.     Renal cell carcinoma has returned. Has consulted with oncology at [facility].  Plan at this time is to monitor with scans every 6 months.    If the tumor grows the plan will be cryotherapy, not a candidate for surgery due to previous partial nephrectomy.      Neck Pain   This is a chronic problem. The current episode started more than 1 month ago. The problem occurs constantly. The problem has been waxing and waning. The pain is present in the midline and right side. The quality of the pain is described as aching, cramping and shooting. The pain is at a severity of 1/10. The pain is mild. The symptoms are aggravated by twisting. Pertinent negatives include no chest pain, fever, headaches, numbness or weakness. He has tried acetaminophen, bed rest, heat, ice, NSAIDs, oral narcotics, muscle relaxants, neck support and home exercises  "(hydrocodone) for the symptoms. The treatment provided moderate relief.   Arm Pain   The pain is present in the left hand, right hand, upper left arm and upper right arm. The quality of the pain is described as aching (tingling). The pain radiates to the right arm and right hand. The pain is at a severity of 1/10. The pain is mild. The pain has been Fluctuating since the incident. Pertinent negatives include no chest pain or numbness.        PEG Assessment   What number best describes your pain on average in the past week?1  What number best describes how, during the past week, pain has interfered with your enjoyment of life?0  What number best describes how, during the past week, pain has interfered with your general activity?  0    Review of Pertinent Medical Data ---    The following portions of the patient's history were reviewed and updated as appropriate: allergies, current medications, past family history, past medical history, past social history, past surgical history, and problem list.    Review of Systems   Constitutional:  Negative for activity change and fever.   Cardiovascular:  Negative for chest pain.   Gastrointestinal:  Negative for constipation and diarrhea.   Genitourinary:  Negative for difficulty urinating.   Musculoskeletal:  Positive for neck pain and neck stiffness.   Neurological:  Negative for dizziness, weakness, numbness and headaches.   Psychiatric/Behavioral:  Negative for self-injury, sleep disturbance and suicidal ideas.        I have reviewed and confirmed the accuracy of the ROS as documented by the MA/LPN/RN EMILIE Shrestha    Vitals:    05/23/25 1451   BP: 137/85   Pulse: 54   Resp: 18   Temp: 98 °F (36.7 °C)   SpO2: 97%   Weight: 112 kg (247 lb)   Height: 172.7 cm (68\")         Objective   Physical Exam  Vitals and nursing note reviewed.   Constitutional:       General: He is not in acute distress.     Appearance: Normal appearance. He is not ill-appearing. "   Cardiovascular:      Pulses:           Carotid pulses are 2+ on the right side.  Pulmonary:      Effort: Pulmonary effort is normal. No respiratory distress.   Musculoskeletal:      Right shoulder: Tenderness present. No bony tenderness.      Right upper arm: Tenderness present.      Right hand: Tenderness present.      Cervical back: Tenderness and bony tenderness present.   Neurological:      Mental Status: He is alert and oriented to person, place, and time.      Motor: No weakness.      Gait: Gait is intact. Gait normal.   Psychiatric:         Mood and Affect: Mood normal.         Behavior: Behavior normal.           Assessment & Plan   Diagnoses and all orders for this visit:    1. Chronic pain syndrome (Primary)    2. Neurofibromatosis    3. Brachial plexus neuropathy    4. Encounter for long-term (current) use of high-risk medication    5. Neck pain        --- Continue Horizant, Hydrocodone. Patient appears stable with current regimen. No adverse effects. Regarding continuation of opioids, there is no evidence of aberrant behavior or any red flags.  The patient continues with appropriate response to opioid therapy. ADL's remain intact by self.   --- The urine drug screen confirmation from 2/14/2025 has been reviewed and the result is appropriate based on patient history and BIRGIT report  --- The patient signed an updated copy of the controlled substance agreement on 8/2/2024  --- Opioid risk low - sparing use of medication     Isma De Luna reports a pain score of .  Given his pain assessment as noted, treatment options were discussed and the following options were decided upon as a follow-up plan to address the patient's pain: continuation of current treatment plan for pain.    --- Follow-up 4-5 months                  BIRGIT REPORT  As part of the patient's treatment plan, I am prescribing controlled substances. The patient has been made aware of appropriate use of such medications, including potential  risk of somnolence, limited ability to drive and/or work safely, and the potential for dependence or overdose. It has also been made clear that these medications are for use by this patient only, without concomitant use of alcohol or other substances unless prescribed.     Patient has completed prescribing agreement detailing terms of continued prescribing of controlled substances, including monitoring BIRGIT reports, urine drug screening, and pill counts if necessary. The patient is aware that inappropriate use will results in cessation of prescribing such medications.    As the clinician, I personally reviewed the BIRGIT from 5/23/2025 while the patient was in the office today.    History and physical exam exhibit continued safe and appropriate use of controlled substances.       Dictated utilizing Dragon dictation.

## 2025-05-28 DIAGNOSIS — I10 ESSENTIAL HYPERTENSION: ICD-10-CM

## 2025-05-28 DIAGNOSIS — F39 MOOD DISORDER: ICD-10-CM

## 2025-05-28 RX ORDER — PROPRANOLOL HYDROCHLORIDE 120 MG/1
120 CAPSULE, EXTENDED RELEASE ORAL DAILY
Qty: 90 CAPSULE | Refills: 1 | Status: SHIPPED | OUTPATIENT
Start: 2025-05-28

## 2025-05-28 RX ORDER — DULOXETIN HYDROCHLORIDE 60 MG/1
60 CAPSULE, DELAYED RELEASE ORAL DAILY
Qty: 90 CAPSULE | Refills: 1 | Status: SHIPPED | OUTPATIENT
Start: 2025-05-28

## 2025-05-28 RX ORDER — AMLODIPINE BESYLATE 10 MG/1
10 TABLET ORAL DAILY
Qty: 90 TABLET | Refills: 1 | Status: SHIPPED | OUTPATIENT
Start: 2025-05-28

## 2025-06-16 DIAGNOSIS — Q85.00 NEUROFIBROMATOSIS: ICD-10-CM

## 2025-06-16 DIAGNOSIS — G54.0 BRACHIAL PLEXUS NEUROPATHY: ICD-10-CM

## 2025-06-16 DIAGNOSIS — M79.2 NEUROPATHIC PAIN, ARM: ICD-10-CM

## 2025-06-17 RX ORDER — GABAPENTIN ENACARBIL 600 MG/1
1 TABLET, EXTENDED RELEASE ORAL 2 TIMES DAILY
Qty: 60 TABLET | Refills: 2 | Status: SHIPPED | OUTPATIENT
Start: 2025-06-17

## 2025-08-25 DIAGNOSIS — I10 HYPERTENSION, BENIGN: ICD-10-CM

## 2025-08-25 RX ORDER — LOSARTAN POTASSIUM 100 MG/1
100 TABLET ORAL DAILY
Qty: 90 TABLET | Refills: 1 | Status: SHIPPED | OUTPATIENT
Start: 2025-08-25

## (undated) DEVICE — LN SMPL CO2 SHTRM SD STREAM W/M LUER

## (undated) DEVICE — SENSR O2 OXIMAX FNGR A/ 18IN NONSTR

## (undated) DEVICE — CANN O2 ETCO2 FITS ALL CONN CO2 SMPL A/ 7IN DISP LF

## (undated) DEVICE — PAD GRND REM POLYHESIVE A/ DISP

## (undated) DEVICE — SINGLE-USE POLYPECTOMY SNARE: Brand: CAPTIVATOR II

## (undated) DEVICE — TUBING, SUCTION, 1/4" X 10', STRAIGHT: Brand: MEDLINE

## (undated) DEVICE — THE TORRENT IRRIGATION SCOPE CONNECTOR IS USED WITH THE TORRENT IRRIGATION TUBING TO PROVIDE IRRIGATION FLUIDS SUCH AS STERILE WATER DURING GASTROINTESTINAL ENDOSCOPIC PROCEDURES WHEN USED IN CONJUNCTION WITH AN IRRIGATION PUMP (OR ELECTROSURGICAL UNIT).: Brand: TORRENT

## (undated) DEVICE — ADAPT CLN BIOGUARD AIR/H2O DISP

## (undated) DEVICE — KT ORCA ORCAPOD DISP STRL

## (undated) DEVICE — THE SINGLE USE ETRAP – POLYP TRAP IS USED FOR SUCTION RETRIEVAL OF ENDOSCOPICALLY REMOVED POLYPS.: Brand: ETRAP